# Patient Record
Sex: FEMALE | Race: BLACK OR AFRICAN AMERICAN | Employment: UNEMPLOYED | URBAN - METROPOLITAN AREA
[De-identification: names, ages, dates, MRNs, and addresses within clinical notes are randomized per-mention and may not be internally consistent; named-entity substitution may affect disease eponyms.]

---

## 2018-03-18 ENCOUNTER — HOSPITAL ENCOUNTER (EMERGENCY)
Age: 28
Discharge: HOME | End: 2018-03-18
Payer: COMMERCIAL

## 2018-03-18 VITALS
RESPIRATION RATE: 14 BRPM | SYSTOLIC BLOOD PRESSURE: 133 MMHG | DIASTOLIC BLOOD PRESSURE: 86 MMHG | HEART RATE: 88 BPM | TEMPERATURE: 98.1 F | OXYGEN SATURATION: 99 %

## 2018-03-18 DIAGNOSIS — K04.7 DENTAL ABSCESS: ICD-10-CM

## 2018-03-18 DIAGNOSIS — K08.89 PAIN, DENTAL: Primary | ICD-10-CM

## 2018-03-18 DIAGNOSIS — K02.9 DENTAL DECAY: ICD-10-CM

## 2018-03-18 PROCEDURE — 99282 EMERGENCY DEPT VISIT SF MDM: CPT

## 2018-03-18 RX ORDER — IBUPROFEN 800 MG/1
800 TABLET ORAL EVERY 6 HOURS PRN
Qty: 21 TABLET | Refills: 0 | Status: SHIPPED | OUTPATIENT
Start: 2018-03-18 | End: 2018-07-09 | Stop reason: SDUPTHER

## 2018-03-18 RX ORDER — PENICILLIN V POTASSIUM 500 MG/1
500 TABLET ORAL 4 TIMES DAILY
Qty: 40 TABLET | Refills: 0 | Status: SHIPPED | OUTPATIENT
Start: 2018-03-18 | End: 2018-03-28

## 2018-03-18 ASSESSMENT — PAIN SCALES - GENERAL: PAINLEVEL_OUTOF10: 7

## 2018-03-20 ENCOUNTER — HOSPITAL ENCOUNTER (OUTPATIENT)
Age: 28
Discharge: HOME | End: 2018-03-20
Payer: COMMERCIAL

## 2018-03-20 LAB
ALBUMIN SERPL-MCNC: 4 G/DL (ref 3.5–5.2)
ALP BLD-CCNC: 43 U/L (ref 35–104)
ALT SERPL-CCNC: 27 U/L (ref 0–32)
ANION GAP SERPL CALCULATED.3IONS-SCNC: 13 MMOL/L (ref 7–16)
AST SERPL-CCNC: 21 U/L (ref 0–31)
BASOPHILS ABSOLUTE: 0.04 E9/L (ref 0–0.2)
BASOPHILS RELATIVE PERCENT: 0.6 % (ref 0–2)
BILIRUB SERPL-MCNC: 0.2 MG/DL (ref 0–1.2)
BUN BLDV-MCNC: 17 MG/DL (ref 6–20)
CALCIUM SERPL-MCNC: 9.1 MG/DL (ref 8.6–10.2)
CHLORIDE BLD-SCNC: 102 MMOL/L (ref 98–107)
CO2: 23 MMOL/L (ref 22–29)
CREAT SERPL-MCNC: 0.7 MG/DL (ref 0.5–1)
EOSINOPHILS ABSOLUTE: 0.09 E9/L (ref 0.05–0.5)
EOSINOPHILS RELATIVE PERCENT: 1.4 % (ref 0–6)
GFR AFRICAN AMERICAN: >60
GFR NON-AFRICAN AMERICAN: >60 ML/MIN/1.73
GLUCOSE BLD-MCNC: 104 MG/DL (ref 74–109)
HCT VFR BLD CALC: 40.3 % (ref 34–48)
HEMOGLOBIN: 13 G/DL (ref 11.5–15.5)
IMMATURE GRANULOCYTES #: 0.01 E9/L
IMMATURE GRANULOCYTES %: 0.2 % (ref 0–5)
LYMPHOCYTES ABSOLUTE: 1.58 E9/L (ref 1.5–4)
LYMPHOCYTES RELATIVE PERCENT: 24.2 % (ref 20–42)
MCH RBC QN AUTO: 29 PG (ref 26–35)
MCHC RBC AUTO-ENTMCNC: 32.3 % (ref 32–34.5)
MCV RBC AUTO: 90 FL (ref 80–99.9)
MONOCYTES ABSOLUTE: 0.41 E9/L (ref 0.1–0.95)
MONOCYTES RELATIVE PERCENT: 6.3 % (ref 2–12)
NEUTROPHILS ABSOLUTE: 4.39 E9/L (ref 1.8–7.3)
NEUTROPHILS RELATIVE PERCENT: 67.3 % (ref 43–80)
PDW BLD-RTO: 13.4 FL (ref 11.5–15)
PLATELET # BLD: 282 E9/L (ref 130–450)
PMV BLD AUTO: 10.2 FL (ref 7–12)
POTASSIUM SERPL-SCNC: 4.3 MMOL/L (ref 3.5–5)
PROLACTIN: 16.43 NG/ML
RBC # BLD: 4.48 E12/L (ref 3.5–5.5)
SODIUM BLD-SCNC: 138 MMOL/L (ref 132–146)
T4 TOTAL: 5.1 MCG/DL (ref 4.5–11.7)
TOTAL PROTEIN: 7.1 G/DL (ref 6.4–8.3)
TSH SERPL DL<=0.05 MIU/L-ACNC: 0.74 UIU/ML (ref 0.27–4.2)
WBC # BLD: 6.5 E9/L (ref 4.5–11.5)

## 2018-03-20 PROCEDURE — 85025 COMPLETE CBC W/AUTO DIFF WBC: CPT

## 2018-03-20 PROCEDURE — 84146 ASSAY OF PROLACTIN: CPT

## 2018-03-20 PROCEDURE — 84436 ASSAY OF TOTAL THYROXINE: CPT

## 2018-03-20 PROCEDURE — 36415 COLL VENOUS BLD VENIPUNCTURE: CPT

## 2018-03-20 PROCEDURE — 80053 COMPREHEN METABOLIC PANEL: CPT

## 2018-03-20 PROCEDURE — 84443 ASSAY THYROID STIM HORMONE: CPT

## 2018-06-30 ENCOUNTER — APPOINTMENT (OUTPATIENT)
Dept: GENERAL RADIOLOGY | Age: 28
End: 2018-06-30
Payer: COMMERCIAL

## 2018-06-30 ENCOUNTER — APPOINTMENT (OUTPATIENT)
Dept: CT IMAGING | Age: 28
End: 2018-06-30
Payer: COMMERCIAL

## 2018-06-30 ENCOUNTER — HOSPITAL ENCOUNTER (OUTPATIENT)
Age: 28
Setting detail: OBSERVATION
Discharge: HOME | End: 2018-07-03
Attending: EMERGENCY MEDICINE | Admitting: SURGERY
Payer: COMMERCIAL

## 2018-06-30 ENCOUNTER — HOSPITAL ENCOUNTER (EMERGENCY)
Age: 28
Discharge: LEFT BEFORE DISCHARGE | End: 2018-06-30
Payer: COMMERCIAL

## 2018-06-30 VITALS
SYSTOLIC BLOOD PRESSURE: 127 MMHG | RESPIRATION RATE: 18 BRPM | HEART RATE: 109 BPM | OXYGEN SATURATION: 93 % | DIASTOLIC BLOOD PRESSURE: 100 MMHG

## 2018-06-30 DIAGNOSIS — S16.1XXA STRAIN OF NECK MUSCLE, INITIAL ENCOUNTER: Primary | ICD-10-CM

## 2018-06-30 DIAGNOSIS — T07.XXXA MULTIPLE TRAUMA: Primary | ICD-10-CM
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
## 2018-06-30 PROBLEM — F10.921 ALCOHOL INTOXICATION WITH DELIRIUM (HCC): Status: ACTIVE | Noted: 2018-06-30

## 2018-06-30 PROBLEM — S06.0X1A CONCUSSION WITH LOSS OF CONSCIOUSNESS OF 30 MINUTES OR LESS: Status: ACTIVE | Noted: 2018-06-30

## 2018-06-30 LAB
% INHIBITION AA: 65.1 %
% INHIBITION ADP: 64.4 %
ABO/RH: NORMAL
ACETAMINOPHEN LEVEL: <5 MCG/ML (ref 10–30)
ALBUMIN SERPL-MCNC: 4.2 G/DL (ref 3.5–5.2)
ALP BLD-CCNC: 50 U/L (ref 35–104)
ALT SERPL-CCNC: 28 U/L (ref 0–32)
ANGLE (CLOT STRENGTH): 72 DEGREE (ref 59–74)
ANION GAP SERPL CALCULATED.3IONS-SCNC: 18 MMOL/L (ref 7–16)
ANTIBODY SCREEN: NORMAL
APTT: 29.2 SEC (ref 24.5–35.1)
AST SERPL-CCNC: 27 U/L (ref 0–31)
B.E.: -5.4 MMOL/L (ref -3–3)
BILIRUB SERPL-MCNC: 0.4 MG/DL (ref 0–1.2)
BUN BLDV-MCNC: 18 MG/DL (ref 6–20)
CALCIUM SERPL-MCNC: 9.3 MG/DL (ref 8.6–10.2)
CHLORIDE BLD-SCNC: 102 MMOL/L (ref 98–107)
CO2: 20 MMOL/L (ref 22–29)
COHB: 0.9 % (ref 0–1.5)
CREAT SERPL-MCNC: 0.9 MG/DL (ref 0.5–1)
CRITICAL: ABNORMAL
DATE ANALYZED: ABNORMAL
DATE OF COLLECTION: ABNORMAL
ETHANOL: 204 MG/DL (ref 0–0.08)
G-TEG: 10.2 K D/SC (ref 4.5–11)
GFR AFRICAN AMERICAN: >60
GFR NON-AFRICAN AMERICAN: >60 ML/MIN/1.73
GLUCOSE BLD-MCNC: 100 MG/DL (ref 74–109)
HCG QUALITATIVE: NEGATIVE
HCO3: 18.9 MMOL/L (ref 22–26)
HCT VFR BLD CALC: 39.7 % (ref 34–48)
HEMOGLOBIN: 14.2 G/DL (ref 11.5–15.5)
HHB: 0.3 % (ref 0–5)
INR BLD: 1.1
K (CLOTTING TIME): 1.2 MIN (ref 1–3)
LAB: 9558
LACTIC ACID: 2.2 MMOL/L (ref 0.5–2.2)
Lab: 350
MA (MAX AMPLITUDE): 67.2 MM (ref 50–70)
MA-AA: 26.6 MM
MA-ACTIVATED: 4.8 MM
MA-ADP: 27 MM
MA-TEG BASELINE: 67.2 MM
MCH RBC QN AUTO: 31.9 PG (ref 26–35)
MCHC RBC AUTO-ENTMCNC: 35.8 % (ref 32–34.5)
MCV RBC AUTO: 89.2 FL (ref 80–99.9)
METHB: 0.4 % (ref 0–1.5)
MODE: ABNORMAL
O2 CONTENT: 21.5 ML/DL
O2 SATURATION: 99.7 % (ref 92–98.5)
O2HB: 98.4 % (ref 94–97)
OPERATOR ID: 2860
PATIENT TEMP: 37 C
PCO2: 33.4 MMHG (ref 35–45)
PDW BLD-RTO: 13.7 FL (ref 11.5–15)
PH BLOOD GAS: 7.37 (ref 7.35–7.45)
PLATELET # BLD: 218 E9/L (ref 130–450)
PMV BLD AUTO: 11.2 FL (ref 7–12)
PO2: 457.1 MMHG (ref 60–100)
POTASSIUM SERPL-SCNC: 3.3 MMOL/L (ref 3.5–5)
POTASSIUM SERPL-SCNC: 3.41 MMOL/L (ref 3.3–5.1)
PROTHROMBIN TIME: 12.4 SEC (ref 9.3–12.4)
R (REACTION TIME): 4.5 MIN (ref 5–10)
RBC # BLD: 4.45 E12/L (ref 3.5–5.5)
SALICYLATE, SERUM: <0.3 MG/DL (ref 0–30)
SODIUM BLD-SCNC: 140 MMOL/L (ref 132–146)
SOURCE, BLOOD GAS: ABNORMAL
THB: 14.7 G/DL (ref 11.5–16.5)
TIME ANALYZED: 352
TOTAL PROTEIN: 7.6 G/DL (ref 6.4–8.3)
TRICYCLIC ANTIDEPRESSANTS SCREEN SERUM: NEGATIVE NG/ML
WBC # BLD: 8.5 E9/L (ref 4.5–11.5)

## 2018-06-30 PROCEDURE — 73120 X-RAY EXAM OF HAND: CPT

## 2018-06-30 PROCEDURE — 99222 1ST HOSP IP/OBS MODERATE 55: CPT | Performed by: SURGERY

## 2018-06-30 PROCEDURE — 85347 COAGULATION TIME ACTIVATED: CPT

## 2018-06-30 PROCEDURE — 6360000002 HC RX W HCPCS: Performed by: STUDENT IN AN ORGANIZED HEALTH CARE EDUCATION/TRAINING PROGRAM

## 2018-06-30 PROCEDURE — 71275 CT ANGIOGRAPHY CHEST: CPT

## 2018-06-30 PROCEDURE — 6360000002 HC RX W HCPCS: Performed by: EMERGENCY MEDICINE

## 2018-06-30 PROCEDURE — 6360000004 HC RX CONTRAST MEDICATION: Performed by: RADIOLOGY

## 2018-06-30 PROCEDURE — 80053 COMPREHEN METABOLIC PANEL: CPT

## 2018-06-30 PROCEDURE — 71045 X-RAY EXAM CHEST 1 VIEW: CPT

## 2018-06-30 PROCEDURE — 74177 CT ABD & PELVIS W/CONTRAST: CPT

## 2018-06-30 PROCEDURE — 36415 COLL VENOUS BLD VENIPUNCTURE: CPT

## 2018-06-30 PROCEDURE — 85576 BLOOD PLATELET AGGREGATION: CPT

## 2018-06-30 PROCEDURE — 82805 BLOOD GASES W/O2 SATURATION: CPT

## 2018-06-30 PROCEDURE — 72125 CT NECK SPINE W/O DYE: CPT

## 2018-06-30 PROCEDURE — 86850 RBC ANTIBODY SCREEN: CPT

## 2018-06-30 PROCEDURE — 84703 CHORIONIC GONADOTROPIN ASSAY: CPT

## 2018-06-30 PROCEDURE — G0480 DRUG TEST DEF 1-7 CLASSES: HCPCS

## 2018-06-30 PROCEDURE — G0378 HOSPITAL OBSERVATION PER HR: HCPCS

## 2018-06-30 PROCEDURE — 71260 CT THORAX DX C+: CPT

## 2018-06-30 PROCEDURE — 70450 CT HEAD/BRAIN W/O DYE: CPT

## 2018-06-30 PROCEDURE — 72128 CT CHEST SPINE W/O DYE: CPT

## 2018-06-30 PROCEDURE — 2580000003 HC RX 258: Performed by: RADIOLOGY

## 2018-06-30 PROCEDURE — 85027 COMPLETE CBC AUTOMATED: CPT

## 2018-06-30 PROCEDURE — 85610 PROTHROMBIN TIME: CPT

## 2018-06-30 PROCEDURE — 94150 VITAL CAPACITY TEST: CPT

## 2018-06-30 PROCEDURE — 99285 EMERGENCY DEPT VISIT HI MDM: CPT

## 2018-06-30 PROCEDURE — 70486 CT MAXILLOFACIAL W/O DYE: CPT

## 2018-06-30 PROCEDURE — 99284 EMERGENCY DEPT VISIT MOD MDM: CPT

## 2018-06-30 PROCEDURE — 80307 DRUG TEST PRSMV CHEM ANLYZR: CPT

## 2018-06-30 PROCEDURE — 85730 THROMBOPLASTIN TIME PARTIAL: CPT

## 2018-06-30 PROCEDURE — 85384 FIBRINOGEN ACTIVITY: CPT

## 2018-06-30 PROCEDURE — 6370000000 HC RX 637 (ALT 250 FOR IP): Performed by: STUDENT IN AN ORGANIZED HEALTH CARE EDUCATION/TRAINING PROGRAM

## 2018-06-30 PROCEDURE — 84132 ASSAY OF SERUM POTASSIUM: CPT

## 2018-06-30 PROCEDURE — 96374 THER/PROPH/DIAG INJ IV PUSH: CPT

## 2018-06-30 PROCEDURE — 86900 BLOOD TYPING SEROLOGIC ABO: CPT

## 2018-06-30 PROCEDURE — 36600 WITHDRAWAL OF ARTERIAL BLOOD: CPT

## 2018-06-30 PROCEDURE — 83605 ASSAY OF LACTIC ACID: CPT

## 2018-06-30 PROCEDURE — G0390 TRAUMA RESPONS W/HOSP CRITI: HCPCS

## 2018-06-30 PROCEDURE — 72170 X-RAY EXAM OF PELVIS: CPT

## 2018-06-30 PROCEDURE — 72131 CT LUMBAR SPINE W/O DYE: CPT

## 2018-06-30 PROCEDURE — 2580000003 HC RX 258: Performed by: STUDENT IN AN ORGANIZED HEALTH CARE EDUCATION/TRAINING PROGRAM

## 2018-06-30 PROCEDURE — 86901 BLOOD TYPING SEROLOGIC RH(D): CPT

## 2018-06-30 PROCEDURE — 73610 X-RAY EXAM OF ANKLE: CPT

## 2018-06-30 PROCEDURE — 96375 TX/PRO/DX INJ NEW DRUG ADDON: CPT

## 2018-06-30 RX ORDER — ONDANSETRON 2 MG/ML
4 INJECTION INTRAMUSCULAR; INTRAVENOUS EVERY 6 HOURS PRN
Status: DISCONTINUED | OUTPATIENT
Start: 2018-06-30 | End: 2018-07-03 | Stop reason: HOSPADM

## 2018-06-30 RX ORDER — BUPROPION HYDROCHLORIDE 100 MG/1
100 TABLET, EXTENDED RELEASE ORAL DAILY
COMMUNITY
End: 2020-08-17 | Stop reason: SDUPTHER

## 2018-06-30 RX ORDER — MORPHINE SULFATE 2 MG/ML
2 INJECTION, SOLUTION INTRAMUSCULAR; INTRAVENOUS EVERY 4 HOURS PRN
Status: DISCONTINUED | OUTPATIENT
Start: 2018-06-30 | End: 2018-06-30 | Stop reason: HOSPADM

## 2018-06-30 RX ORDER — SODIUM CHLORIDE 0.9 % (FLUSH) 0.9 %
10 SYRINGE (ML) INJECTION EVERY 12 HOURS SCHEDULED
Status: DISCONTINUED | OUTPATIENT
Start: 2018-06-30 | End: 2018-07-03 | Stop reason: HOSPADM

## 2018-06-30 RX ORDER — ONDANSETRON 2 MG/ML
4 INJECTION INTRAMUSCULAR; INTRAVENOUS EVERY 6 HOURS PRN
Status: DISCONTINUED | OUTPATIENT
Start: 2018-06-30 | End: 2018-06-30 | Stop reason: HOSPADM

## 2018-06-30 RX ORDER — ACETAMINOPHEN 500 MG
1000 TABLET ORAL ONCE
Status: DISCONTINUED | OUTPATIENT
Start: 2018-06-30 | End: 2018-06-30 | Stop reason: HOSPADM

## 2018-06-30 RX ORDER — FENTANYL CITRATE 50 UG/ML
50 INJECTION, SOLUTION INTRAMUSCULAR; INTRAVENOUS ONCE
Status: COMPLETED | OUTPATIENT
Start: 2018-06-30 | End: 2018-06-30

## 2018-06-30 RX ORDER — ZIPRASIDONE MESYLATE 20 MG/ML
INJECTION, POWDER, LYOPHILIZED, FOR SOLUTION INTRAMUSCULAR
Status: DISCONTINUED
Start: 2018-06-30 | End: 2018-06-30 | Stop reason: HOSPADM

## 2018-06-30 RX ORDER — SODIUM CHLORIDE 0.9 % (FLUSH) 0.9 %
10 SYRINGE (ML) INJECTION PRN
Status: DISCONTINUED | OUTPATIENT
Start: 2018-06-30 | End: 2018-07-03 | Stop reason: HOSPADM

## 2018-06-30 RX ORDER — ACETAMINOPHEN 325 MG/1
650 TABLET ORAL EVERY 4 HOURS PRN
Status: DISCONTINUED | OUTPATIENT
Start: 2018-06-30 | End: 2018-07-01

## 2018-06-30 RX ORDER — TETRACAINE HYDROCHLORIDE 5 MG/ML
2 SOLUTION OPHTHALMIC ONCE
Status: DISCONTINUED | OUTPATIENT
Start: 2018-06-30 | End: 2018-06-30 | Stop reason: HOSPADM

## 2018-06-30 RX ORDER — SODIUM CHLORIDE 9 MG/ML
INJECTION, SOLUTION INTRAVENOUS CONTINUOUS
Status: DISCONTINUED | OUTPATIENT
Start: 2018-06-30 | End: 2018-06-30 | Stop reason: HOSPADM

## 2018-06-30 RX ORDER — 0.9 % SODIUM CHLORIDE 0.9 %
1000 INTRAVENOUS SOLUTION INTRAVENOUS ONCE
Status: COMPLETED | OUTPATIENT
Start: 2018-06-30 | End: 2018-06-30

## 2018-06-30 RX ORDER — SODIUM CHLORIDE 0.9 % (FLUSH) 0.9 %
10 SYRINGE (ML) INJECTION ONCE
Status: COMPLETED | OUTPATIENT
Start: 2018-06-30 | End: 2018-06-30

## 2018-06-30 RX ORDER — HYDROCODONE BITARTRATE AND ACETAMINOPHEN 5; 325 MG/1; MG/1
1 TABLET ORAL EVERY 4 HOURS PRN
Status: DISCONTINUED | OUTPATIENT
Start: 2018-06-30 | End: 2018-07-03 | Stop reason: HOSPADM

## 2018-06-30 RX ADMIN — FENTANYL CITRATE 50 MCG: 50 INJECTION, SOLUTION INTRAMUSCULAR; INTRAVENOUS at 08:05

## 2018-06-30 RX ADMIN — HYDROCODONE BITARTRATE AND ACETAMINOPHEN 1 TABLET: 5; 325 TABLET ORAL at 16:39

## 2018-06-30 RX ADMIN — HYDROCODONE BITARTRATE AND ACETAMINOPHEN 1 TABLET: 5; 325 TABLET ORAL at 21:00

## 2018-06-30 RX ADMIN — IOPAMIDOL 60 ML: 755 INJECTION, SOLUTION INTRAVENOUS at 13:44

## 2018-06-30 RX ADMIN — Medication 10 ML: at 21:00

## 2018-06-30 RX ADMIN — Medication 10 ML: at 08:29

## 2018-06-30 RX ADMIN — ONDANSETRON 4 MG: 2 INJECTION INTRAMUSCULAR; INTRAVENOUS at 18:02

## 2018-06-30 RX ADMIN — FENTANYL CITRATE 50 MCG: 50 INJECTION, SOLUTION INTRAMUSCULAR; INTRAVENOUS at 10:30

## 2018-06-30 RX ADMIN — SODIUM CHLORIDE 1000 ML: 9 INJECTION, SOLUTION INTRAVENOUS at 10:30

## 2018-06-30 RX ADMIN — IOPAMIDOL 110 ML: 755 INJECTION, SOLUTION INTRAVENOUS at 08:29

## 2018-06-30 ASSESSMENT — PAIN DESCRIPTION - PAIN TYPE
TYPE: ACUTE PAIN

## 2018-06-30 ASSESSMENT — PAIN SCALES - GENERAL
PAINLEVEL_OUTOF10: 8
PAINLEVEL_OUTOF10: 8
PAINLEVEL_OUTOF10: 0
PAINLEVEL_OUTOF10: 2
PAINLEVEL_OUTOF10: 6
PAINLEVEL_OUTOF10: 8
PAINLEVEL_OUTOF10: 7
PAINLEVEL_OUTOF10: 1
PAINLEVEL_OUTOF10: 8

## 2018-06-30 ASSESSMENT — PAIN DESCRIPTION - LOCATION
LOCATION: NECK
LOCATION: NECK
LOCATION: FACE;GENERALIZED
LOCATION: NECK

## 2018-06-30 ASSESSMENT — PAIN DESCRIPTION - FREQUENCY: FREQUENCY: CONTINUOUS

## 2018-06-30 ASSESSMENT — PAIN DESCRIPTION - DESCRIPTORS
DESCRIPTORS: ACHING;CONSTANT;DISCOMFORT
DESCRIPTORS: DISCOMFORT
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
## 2018-06-30 NOTE — PROGRESS NOTES
ABG drawn x 1 from Left Brachial. Patient had NormalAllen's Test.  Patient was on 15 liters/min via non-rebreather face mask  at time of puncture. Pressure held for 5. No bleeding or bruising noted at puncture site.   Patient tolerated procedure well      Performed by oCni Griffith

## 2018-07-01 ENCOUNTER — APPOINTMENT (OUTPATIENT)
Dept: GENERAL RADIOLOGY | Age: 28
End: 2018-07-01
Payer: COMMERCIAL

## 2018-07-01 ENCOUNTER — APPOINTMENT (OUTPATIENT)
Dept: MRI IMAGING | Age: 28
End: 2018-07-01
Payer: COMMERCIAL

## 2018-07-01 PROBLEM — S14.109A CONTUSION OF CERVICAL CORD (HCC): Status: ACTIVE | Noted: 2018-07-01

## 2018-07-01 LAB
ALBUMIN SERPL-MCNC: 3.7 G/DL (ref 3.5–5.2)
ALP BLD-CCNC: 49 U/L (ref 35–104)
ALT SERPL-CCNC: 28 U/L (ref 0–32)
ANION GAP SERPL CALCULATED.3IONS-SCNC: 14 MMOL/L (ref 7–16)
AST SERPL-CCNC: 32 U/L (ref 0–31)
BASOPHILS ABSOLUTE: 0.03 E9/L (ref 0–0.2)
BASOPHILS RELATIVE PERCENT: 0.4 % (ref 0–2)
BILIRUB SERPL-MCNC: 0.8 MG/DL (ref 0–1.2)
BUN BLDV-MCNC: 16 MG/DL (ref 6–20)
CALCIUM SERPL-MCNC: 8.5 MG/DL (ref 8.6–10.2)
CHLORIDE BLD-SCNC: 104 MMOL/L (ref 98–107)
CO2: 21 MMOL/L (ref 22–29)
CREAT SERPL-MCNC: 0.7 MG/DL (ref 0.5–1)
EOSINOPHILS ABSOLUTE: 0.06 E9/L (ref 0.05–0.5)
EOSINOPHILS RELATIVE PERCENT: 0.8 % (ref 0–6)
GFR AFRICAN AMERICAN: >60
GFR NON-AFRICAN AMERICAN: >60 ML/MIN/1.73
GLUCOSE BLD-MCNC: 89 MG/DL (ref 74–109)
HCT VFR BLD CALC: 38.9 % (ref 34–48)
HEMOGLOBIN: 13 G/DL (ref 11.5–15.5)
IMMATURE GRANULOCYTES #: 0.03 E9/L
IMMATURE GRANULOCYTES %: 0.4 % (ref 0–5)
LYMPHOCYTES ABSOLUTE: 1.94 E9/L (ref 1.5–4)
LYMPHOCYTES RELATIVE PERCENT: 26.7 % (ref 20–42)
MCH RBC QN AUTO: 30.3 PG (ref 26–35)
MCHC RBC AUTO-ENTMCNC: 33.4 % (ref 32–34.5)
MCV RBC AUTO: 90.7 FL (ref 80–99.9)
MONOCYTES ABSOLUTE: 0.61 E9/L (ref 0.1–0.95)
MONOCYTES RELATIVE PERCENT: 8.4 % (ref 2–12)
NEUTROPHILS ABSOLUTE: 4.6 E9/L (ref 1.8–7.3)
NEUTROPHILS RELATIVE PERCENT: 63.3 % (ref 43–80)
PDW BLD-RTO: 14 FL (ref 11.5–15)
PLATELET # BLD: 186 E9/L (ref 130–450)
PMV BLD AUTO: 11.1 FL (ref 7–12)
POTASSIUM REFLEX MAGNESIUM: 3.7 MMOL/L (ref 3.5–5)
RBC # BLD: 4.29 E12/L (ref 3.5–5.5)
SODIUM BLD-SCNC: 139 MMOL/L (ref 132–146)
TOTAL PROTEIN: 6.7 G/DL (ref 6.4–8.3)
WBC # BLD: 7.3 E9/L (ref 4.5–11.5)

## 2018-07-01 PROCEDURE — 99232 SBSQ HOSP IP/OBS MODERATE 35: CPT | Performed by: SURGERY

## 2018-07-01 PROCEDURE — 36415 COLL VENOUS BLD VENIPUNCTURE: CPT

## 2018-07-01 PROCEDURE — 73590 X-RAY EXAM OF LOWER LEG: CPT

## 2018-07-01 PROCEDURE — 6370000000 HC RX 637 (ALT 250 FOR IP): Performed by: STUDENT IN AN ORGANIZED HEALTH CARE EDUCATION/TRAINING PROGRAM

## 2018-07-01 PROCEDURE — 73610 X-RAY EXAM OF ANKLE: CPT

## 2018-07-01 PROCEDURE — 2580000003 HC RX 258: Performed by: STUDENT IN AN ORGANIZED HEALTH CARE EDUCATION/TRAINING PROGRAM

## 2018-07-01 PROCEDURE — 73552 X-RAY EXAM OF FEMUR 2/>: CPT

## 2018-07-01 PROCEDURE — 80053 COMPREHEN METABOLIC PANEL: CPT

## 2018-07-01 PROCEDURE — G0378 HOSPITAL OBSERVATION PER HR: HCPCS

## 2018-07-01 PROCEDURE — 85025 COMPLETE CBC W/AUTO DIFF WBC: CPT

## 2018-07-01 PROCEDURE — 72146 MRI CHEST SPINE W/O DYE: CPT

## 2018-07-01 PROCEDURE — 72141 MRI NECK SPINE W/O DYE: CPT

## 2018-07-01 RX ORDER — NICOTINE 21 MG/24HR
1 PATCH, TRANSDERMAL 24 HOURS TRANSDERMAL DAILY
Status: DISCONTINUED | OUTPATIENT
Start: 2018-07-01 | End: 2018-07-03 | Stop reason: HOSPADM

## 2018-07-01 RX ORDER — ACETAMINOPHEN 325 MG/1
650 TABLET ORAL EVERY 4 HOURS
Status: DISCONTINUED | OUTPATIENT
Start: 2018-07-01 | End: 2018-07-03 | Stop reason: HOSPADM

## 2018-07-01 RX ORDER — IBUPROFEN 800 MG/1
800 TABLET ORAL
Status: DISCONTINUED | OUTPATIENT
Start: 2018-07-01 | End: 2018-07-03 | Stop reason: HOSPADM

## 2018-07-01 RX ORDER — DOCUSATE SODIUM 100 MG/1
100 CAPSULE, LIQUID FILLED ORAL DAILY
Status: DISCONTINUED | OUTPATIENT
Start: 2018-07-01 | End: 2018-07-03 | Stop reason: HOSPADM

## 2018-07-01 RX ADMIN — HYDROCODONE BITARTRATE AND ACETAMINOPHEN 1 TABLET: 5; 325 TABLET ORAL at 20:39

## 2018-07-01 RX ADMIN — Medication 10 ML: at 08:22

## 2018-07-01 RX ADMIN — IBUPROFEN 800 MG: 800 TABLET ORAL at 20:40

## 2018-07-01 RX ADMIN — ACETAMINOPHEN 650 MG: 325 TABLET, FILM COATED ORAL at 08:22

## 2018-07-01 RX ADMIN — Medication 10 ML: at 20:40

## 2018-07-01 RX ADMIN — DOCUSATE SODIUM 100 MG: 100 CAPSULE, LIQUID FILLED ORAL at 11:57

## 2018-07-01 RX ADMIN — HYDROCODONE BITARTRATE AND ACETAMINOPHEN 1 TABLET: 5; 325 TABLET ORAL at 01:03

## 2018-07-01 RX ADMIN — HYDROCODONE BITARTRATE AND ACETAMINOPHEN 1 TABLET: 5; 325 TABLET ORAL at 11:57

## 2018-07-01 RX ADMIN — HYDROCODONE BITARTRATE AND ACETAMINOPHEN 1 TABLET: 5; 325 TABLET ORAL at 06:10

## 2018-07-01 RX ADMIN — ACETAMINOPHEN 650 MG: 325 TABLET ORAL at 20:39

## 2018-07-01 RX ADMIN — HYDROCODONE BITARTRATE AND ACETAMINOPHEN 1 TABLET: 5; 325 TABLET ORAL at 16:43

## 2018-07-01 ASSESSMENT — PAIN DESCRIPTION - PAIN TYPE
TYPE: ACUTE PAIN

## 2018-07-01 ASSESSMENT — PAIN DESCRIPTION - ONSET
ONSET: ON-GOING

## 2018-07-01 ASSESSMENT — PAIN SCALES - GENERAL
PAINLEVEL_OUTOF10: 10
PAINLEVEL_OUTOF10: 0
PAINLEVEL_OUTOF10: 8
PAINLEVEL_OUTOF10: 0
PAINLEVEL_OUTOF10: 0
PAINLEVEL_OUTOF10: 8
PAINLEVEL_OUTOF10: 0
PAINLEVEL_OUTOF10: 8
PAINLEVEL_OUTOF10: 10
PAINLEVEL_OUTOF10: 10

## 2018-07-01 ASSESSMENT — PAIN DESCRIPTION - DESCRIPTORS
DESCRIPTORS: ACHING;CONSTANT;DISCOMFORT
DESCRIPTORS: ACHING;CONSTANT;DISCOMFORT
DESCRIPTORS: DISCOMFORT;ACHING
DESCRIPTORS: ACHING;CONSTANT;DISCOMFORT
DESCRIPTORS: ACHING;SORE
DESCRIPTORS: ACHING;DISCOMFORT;CONSTANT

## 2018-07-01 ASSESSMENT — PAIN DESCRIPTION - LOCATION
LOCATION: GENERALIZED
LOCATION: NECK
LOCATION: GENERALIZED
LOCATION: NECK
LOCATION: GENERALIZED;BACK
LOCATION: NECK

## 2018-07-01 ASSESSMENT — PAIN DESCRIPTION - FREQUENCY
FREQUENCY: CONTINUOUS
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
## 2018-07-01 NOTE — PLAN OF CARE
Problem: Pain:  Goal: Control of acute pain  Control of acute pain   Outcome: Met This Shift      Problem: Risk for Impaired Skin Integrity  Goal: Tissue integrity - skin and mucous membranes  Structural intactness and normal physiological function of skin and  mucous membranes.    Outcome: Met This Shift

## 2018-07-01 NOTE — PROGRESS NOTES
Neuro consult sent to Dr. Jennifer Shields, by perfect, awaiting reply.   Kevin Norman, RN  7/1/2018  7:45 PM

## 2018-07-02 LAB
ALBUMIN SERPL-MCNC: 3.5 G/DL (ref 3.5–5.2)
ALP BLD-CCNC: 42 U/L (ref 35–104)
ALT SERPL-CCNC: 27 U/L (ref 0–32)
ANION GAP SERPL CALCULATED.3IONS-SCNC: 11 MMOL/L (ref 7–16)
AST SERPL-CCNC: 24 U/L (ref 0–31)
BASOPHILS ABSOLUTE: 0.04 E9/L (ref 0–0.2)
BASOPHILS RELATIVE PERCENT: 0.7 % (ref 0–2)
BILIRUB SERPL-MCNC: 0.4 MG/DL (ref 0–1.2)
BUN BLDV-MCNC: 18 MG/DL (ref 6–20)
CALCIUM SERPL-MCNC: 8.5 MG/DL (ref 8.6–10.2)
CHLORIDE BLD-SCNC: 100 MMOL/L (ref 98–107)
CO2: 24 MMOL/L (ref 22–29)
CREAT SERPL-MCNC: 0.7 MG/DL (ref 0.5–1)
EOSINOPHILS ABSOLUTE: 0.11 E9/L (ref 0.05–0.5)
EOSINOPHILS RELATIVE PERCENT: 2 % (ref 0–6)
GFR AFRICAN AMERICAN: >60
GFR NON-AFRICAN AMERICAN: >60 ML/MIN/1.73
GLUCOSE BLD-MCNC: 108 MG/DL (ref 74–109)
HCT VFR BLD CALC: 37.8 % (ref 34–48)
HEMOGLOBIN: 12.9 G/DL (ref 11.5–15.5)
IMMATURE GRANULOCYTES #: 0.02 E9/L
IMMATURE GRANULOCYTES %: 0.4 % (ref 0–5)
LYMPHOCYTES ABSOLUTE: 1.73 E9/L (ref 1.5–4)
LYMPHOCYTES RELATIVE PERCENT: 30.7 % (ref 20–42)
MAGNESIUM: 2.1 MG/DL (ref 1.6–2.6)
MCH RBC QN AUTO: 30.9 PG (ref 26–35)
MCHC RBC AUTO-ENTMCNC: 34.1 % (ref 32–34.5)
MCV RBC AUTO: 90.6 FL (ref 80–99.9)
MONOCYTES ABSOLUTE: 0.35 E9/L (ref 0.1–0.95)
MONOCYTES RELATIVE PERCENT: 6.2 % (ref 2–12)
NEUTROPHILS ABSOLUTE: 3.39 E9/L (ref 1.8–7.3)
NEUTROPHILS RELATIVE PERCENT: 60 % (ref 43–80)
PDW BLD-RTO: 13.8 FL (ref 11.5–15)
PLATELET # BLD: 202 E9/L (ref 130–450)
PMV BLD AUTO: 11.1 FL (ref 7–12)
POTASSIUM REFLEX MAGNESIUM: 3.4 MMOL/L (ref 3.5–5)
RBC # BLD: 4.17 E12/L (ref 3.5–5.5)
SODIUM BLD-SCNC: 135 MMOL/L (ref 132–146)
TOTAL PROTEIN: 6.8 G/DL (ref 6.4–8.3)
WBC # BLD: 5.6 E9/L (ref 4.5–11.5)

## 2018-07-02 PROCEDURE — 6370000000 HC RX 637 (ALT 250 FOR IP): Performed by: STUDENT IN AN ORGANIZED HEALTH CARE EDUCATION/TRAINING PROGRAM

## 2018-07-02 PROCEDURE — 99232 SBSQ HOSP IP/OBS MODERATE 35: CPT | Performed by: SURGERY

## 2018-07-02 PROCEDURE — APPSS45 APP SPLIT SHARED TIME 31-45 MINUTES: Performed by: NURSE PRACTITIONER

## 2018-07-02 PROCEDURE — 36415 COLL VENOUS BLD VENIPUNCTURE: CPT

## 2018-07-02 PROCEDURE — 85025 COMPLETE CBC W/AUTO DIFF WBC: CPT

## 2018-07-02 PROCEDURE — 80053 COMPREHEN METABOLIC PANEL: CPT

## 2018-07-02 PROCEDURE — G8979 MOBILITY GOAL STATUS: HCPCS

## 2018-07-02 PROCEDURE — 83735 ASSAY OF MAGNESIUM: CPT

## 2018-07-02 PROCEDURE — 6370000000 HC RX 637 (ALT 250 FOR IP): Performed by: NURSE PRACTITIONER

## 2018-07-02 PROCEDURE — G8987 SELF CARE CURRENT STATUS: HCPCS

## 2018-07-02 PROCEDURE — 97165 OT EVAL LOW COMPLEX 30 MIN: CPT

## 2018-07-02 PROCEDURE — G8988 SELF CARE GOAL STATUS: HCPCS

## 2018-07-02 PROCEDURE — 6360000002 HC RX W HCPCS: Performed by: STUDENT IN AN ORGANIZED HEALTH CARE EDUCATION/TRAINING PROGRAM

## 2018-07-02 PROCEDURE — G8978 MOBILITY CURRENT STATUS: HCPCS

## 2018-07-02 PROCEDURE — 2580000003 HC RX 258: Performed by: STUDENT IN AN ORGANIZED HEALTH CARE EDUCATION/TRAINING PROGRAM

## 2018-07-02 PROCEDURE — 97162 PT EVAL MOD COMPLEX 30 MIN: CPT

## 2018-07-02 PROCEDURE — G0378 HOSPITAL OBSERVATION PER HR: HCPCS

## 2018-07-02 PROCEDURE — 96376 TX/PRO/DX INJ SAME DRUG ADON: CPT

## 2018-07-02 PROCEDURE — 99219 PR INITIAL OBSERVATION CARE/DAY 50 MINUTES: CPT | Performed by: NEUROLOGICAL SURGERY

## 2018-07-02 RX ORDER — POTASSIUM CHLORIDE 20 MEQ/1
40 TABLET, EXTENDED RELEASE ORAL ONCE
Status: COMPLETED | OUTPATIENT
Start: 2018-07-02 | End: 2018-07-02

## 2018-07-02 RX ORDER — METHOCARBAMOL 500 MG/1
500 TABLET, FILM COATED ORAL 4 TIMES DAILY
Status: DISCONTINUED | OUTPATIENT
Start: 2018-07-02 | End: 2018-07-03 | Stop reason: HOSPADM

## 2018-07-02 RX ADMIN — HYDROCODONE BITARTRATE AND ACETAMINOPHEN 1 TABLET: 5; 325 TABLET ORAL at 13:16

## 2018-07-02 RX ADMIN — METHOCARBAMOL 500 MG: 500 TABLET ORAL at 20:48

## 2018-07-02 RX ADMIN — ACETAMINOPHEN 650 MG: 325 TABLET ORAL at 12:04

## 2018-07-02 RX ADMIN — IBUPROFEN 800 MG: 800 TABLET ORAL at 12:04

## 2018-07-02 RX ADMIN — ACETAMINOPHEN 650 MG: 325 TABLET ORAL at 20:51

## 2018-07-02 RX ADMIN — IBUPROFEN 800 MG: 800 TABLET ORAL at 16:01

## 2018-07-02 RX ADMIN — METHOCARBAMOL 500 MG: 500 TABLET ORAL at 16:01

## 2018-07-02 RX ADMIN — HYDROCODONE BITARTRATE AND ACETAMINOPHEN 1 TABLET: 5; 325 TABLET ORAL at 23:11

## 2018-07-02 RX ADMIN — ONDANSETRON 4 MG: 2 INJECTION INTRAMUSCULAR; INTRAVENOUS at 08:21

## 2018-07-02 RX ADMIN — HYDROCODONE BITARTRATE AND ACETAMINOPHEN 1 TABLET: 5; 325 TABLET ORAL at 08:20

## 2018-07-02 RX ADMIN — HYDROCODONE BITARTRATE AND ACETAMINOPHEN 1 TABLET: 5; 325 TABLET ORAL at 19:09

## 2018-07-02 RX ADMIN — HYDROCODONE BITARTRATE AND ACETAMINOPHEN 1 TABLET: 5; 325 TABLET ORAL at 02:01

## 2018-07-02 RX ADMIN — DOCUSATE SODIUM 100 MG: 100 CAPSULE, LIQUID FILLED ORAL at 08:20

## 2018-07-02 RX ADMIN — Medication 10 ML: at 20:47

## 2018-07-02 RX ADMIN — POTASSIUM CHLORIDE 40 MEQ: 20 TABLET, EXTENDED RELEASE ORAL at 16:02

## 2018-07-02 RX ADMIN — IBUPROFEN 800 MG: 800 TABLET ORAL at 08:20

## 2018-07-02 RX ADMIN — Medication 10 ML: at 08:21

## 2018-07-02 ASSESSMENT — PAIN DESCRIPTION - DESCRIPTORS
DESCRIPTORS: ACHING;CONSTANT;DISCOMFORT
DESCRIPTORS: ACHING
DESCRIPTORS: ACHING;CONSTANT;DISCOMFORT

## 2018-07-02 ASSESSMENT — PAIN SCALES - GENERAL
PAINLEVEL_OUTOF10: 8
PAINLEVEL_OUTOF10: 8
PAINLEVEL_OUTOF10: 0
PAINLEVEL_OUTOF10: 8
PAINLEVEL_OUTOF10: 8
PAINLEVEL_OUTOF10: 6
PAINLEVEL_OUTOF10: 8
PAINLEVEL_OUTOF10: 10
PAINLEVEL_OUTOF10: 10
PAINLEVEL_OUTOF10: 0
PAINLEVEL_OUTOF10: 0

## 2018-07-02 ASSESSMENT — PAIN DESCRIPTION - PAIN TYPE
TYPE: ACUTE PAIN
TYPE: ACUTE PAIN

## 2018-07-02 ASSESSMENT — PAIN DESCRIPTION - FREQUENCY
FREQUENCY: CONTINUOUS
FREQUENCY: CONTINUOUS

## 2018-07-02 ASSESSMENT — ENCOUNTER SYMPTOMS
GASTROINTESTINAL NEGATIVE: 1
RESPIRATORY NEGATIVE: 1
EYES NEGATIVE: 1

## 2018-07-02 ASSESSMENT — PAIN DESCRIPTION - ONSET
ONSET: ON-GOING
ONSET: ON-GOING

## 2018-07-02 ASSESSMENT — PAIN DESCRIPTION - LOCATION
LOCATION: GENERALIZED
LOCATION: NECK
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
## 2018-07-02 NOTE — PROGRESS NOTES
Cervical imaging negative for acute fx or ligamentous injury. Cervical collar removed. Palpation with tenderness paraspinal area. Limited ROM d/t stiffness from collar. Robaxin ordered. Cervical collar to remain off. Soft collar PRN.

## 2018-07-02 NOTE — PROGRESS NOTES
status. Respirations easy non labored. · GI: No acute issues. Monitor bowel function. · Renal: Hypokalemia, supplement. Monitor UOP and electrolytes. · ID: No acute issues. Afebrile. · Endocrine:  No acute issues. · MSK: Tenderness of C spine. Robaxin. PT/OT  · Heme: No acute issues. · Optho: Blurred vision of left eye. Opthalmology following. Bowel regime: Colace. PRN MOM. Pain control/Sedation: Robaxin. Norco.  DVT prophylaxis: SCDs. Lovenox. GI: Diet. Zofran. Mouth/Eye care:. Per pt as needed. Code status: Full    Patient/Family update: Pt questions answered at bedside.     Disposition: . Pck 125, APRN-CNP  7/2/2018  10:30 AM
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
## 2018-07-02 NOTE — PROGRESS NOTES
PCP Dr Citlalli Blanco notified of admission    Electronically signed by AKHIL Sanchez CNP on 7/2/2018 at 9:51 AM

## 2018-07-03 VITALS
DIASTOLIC BLOOD PRESSURE: 64 MMHG | HEART RATE: 58 BPM | WEIGHT: 140 LBS | RESPIRATION RATE: 16 BRPM | SYSTOLIC BLOOD PRESSURE: 104 MMHG | HEIGHT: 64 IN | OXYGEN SATURATION: 95 % | BODY MASS INDEX: 23.9 KG/M2 | TEMPERATURE: 97.2 F

## 2018-07-03 LAB
ANION GAP SERPL CALCULATED.3IONS-SCNC: 13 MMOL/L (ref 7–16)
BUN BLDV-MCNC: 19 MG/DL (ref 6–20)
CALCIUM SERPL-MCNC: 8.9 MG/DL (ref 8.6–10.2)
CHLORIDE BLD-SCNC: 100 MMOL/L (ref 98–107)
CO2: 23 MMOL/L (ref 22–29)
CREAT SERPL-MCNC: 0.7 MG/DL (ref 0.5–1)
GFR AFRICAN AMERICAN: >60
GFR NON-AFRICAN AMERICAN: >60 ML/MIN/1.73
GLUCOSE BLD-MCNC: 88 MG/DL (ref 74–109)
POTASSIUM SERPL-SCNC: 4 MMOL/L (ref 3.5–5)
SODIUM BLD-SCNC: 136 MMOL/L (ref 132–146)

## 2018-07-03 PROCEDURE — APPSS30 APP SPLIT SHARED TIME 16-30 MINUTES: Performed by: NURSE PRACTITIONER

## 2018-07-03 PROCEDURE — 99238 HOSP IP/OBS DSCHRG MGMT 30/<: CPT | Performed by: SURGERY

## 2018-07-03 PROCEDURE — 2580000003 HC RX 258: Performed by: STUDENT IN AN ORGANIZED HEALTH CARE EDUCATION/TRAINING PROGRAM

## 2018-07-03 PROCEDURE — G0378 HOSPITAL OBSERVATION PER HR: HCPCS

## 2018-07-03 PROCEDURE — 80048 BASIC METABOLIC PNL TOTAL CA: CPT

## 2018-07-03 PROCEDURE — 6370000000 HC RX 637 (ALT 250 FOR IP): Performed by: STUDENT IN AN ORGANIZED HEALTH CARE EDUCATION/TRAINING PROGRAM

## 2018-07-03 PROCEDURE — 6370000000 HC RX 637 (ALT 250 FOR IP): Performed by: NURSE PRACTITIONER

## 2018-07-03 PROCEDURE — 99225 PR SBSQ OBSERVATION CARE/DAY 25 MINUTES: CPT | Performed by: NEUROLOGICAL SURGERY

## 2018-07-03 PROCEDURE — 36415 COLL VENOUS BLD VENIPUNCTURE: CPT

## 2018-07-03 RX ORDER — HYDROCODONE BITARTRATE AND ACETAMINOPHEN 5; 325 MG/1; MG/1
1 TABLET ORAL EVERY 6 HOURS PRN
Qty: 21 TABLET | Refills: 0 | Status: SHIPPED | OUTPATIENT
Start: 2018-07-03 | End: 2018-07-06

## 2018-07-03 RX ORDER — IBUPROFEN 800 MG/1
800 TABLET ORAL
Qty: 120 TABLET | Refills: 3 | Status: SHIPPED | OUTPATIENT
Start: 2018-07-03 | End: 2018-10-09

## 2018-07-03 RX ORDER — METHOCARBAMOL 500 MG/1
500 TABLET, FILM COATED ORAL 4 TIMES DAILY
Qty: 40 TABLET | Refills: 0 | Status: SHIPPED | OUTPATIENT
Start: 2018-07-03 | End: 2018-07-13

## 2018-07-03 RX ADMIN — METHOCARBAMOL 500 MG: 500 TABLET ORAL at 09:48

## 2018-07-03 RX ADMIN — METHOCARBAMOL 500 MG: 500 TABLET ORAL at 13:16

## 2018-07-03 RX ADMIN — IBUPROFEN 800 MG: 800 TABLET ORAL at 13:16

## 2018-07-03 RX ADMIN — HYDROCODONE BITARTRATE AND ACETAMINOPHEN 1 TABLET: 5; 325 TABLET ORAL at 14:18

## 2018-07-03 RX ADMIN — HYDROCODONE BITARTRATE AND ACETAMINOPHEN 1 TABLET: 5; 325 TABLET ORAL at 10:15

## 2018-07-03 RX ADMIN — HYDROCODONE BITARTRATE AND ACETAMINOPHEN 1 TABLET: 5; 325 TABLET ORAL at 06:02

## 2018-07-03 RX ADMIN — MAGNESIUM HYDROXIDE 30 ML: 400 SUSPENSION ORAL at 09:56

## 2018-07-03 RX ADMIN — Medication 10 ML: at 09:50

## 2018-07-03 RX ADMIN — DOCUSATE SODIUM 100 MG: 100 CAPSULE, LIQUID FILLED ORAL at 09:49

## 2018-07-03 RX ADMIN — IBUPROFEN 800 MG: 800 TABLET ORAL at 09:48

## 2018-07-03 ASSESSMENT — PAIN DESCRIPTION - PAIN TYPE
TYPE: ACUTE PAIN
TYPE: ACUTE PAIN

## 2018-07-03 ASSESSMENT — PAIN SCALES - GENERAL
PAINLEVEL_OUTOF10: 8
PAINLEVEL_OUTOF10: 2
PAINLEVEL_OUTOF10: 4
PAINLEVEL_OUTOF10: 9
PAINLEVEL_OUTOF10: 8
PAINLEVEL_OUTOF10: 8
PAINLEVEL_OUTOF10: 10

## 2018-07-03 ASSESSMENT — PAIN DESCRIPTION - LOCATION
LOCATION: NECK
LOCATION: NECK

## 2018-07-03 ASSESSMENT — PAIN DESCRIPTION - DESCRIPTORS: DESCRIPTORS: ACHING;DISCOMFORT;SORE

## 2018-07-03 ASSESSMENT — PAIN DESCRIPTION - FREQUENCY: FREQUENCY: INTERMITTENT

## 2018-07-03 ASSESSMENT — PAIN DESCRIPTION - ONSET: ONSET: ON-GOING

## 2018-07-03 NOTE — CARE COORDINATION
CATHLEEN Discharge Planning:    SW met with patient. Patient stated that she will need a ww. Patient is agreeable to [address]. Referral made. Patient will discharge home today with her mom.  Daisy Godoy
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
## 2018-07-03 NOTE — CONSULTS
normal        Assessment:         Plan:  I explained to patient what symptoms should be rechecked. Please notify me immediately if patient develops any change in vision. Please have patient follow up with me or His/Hers ophthalmologist after discharge. My office # is [phone].   Thank you Nusrat Tillman
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
## 2018-07-09 ENCOUNTER — TELEPHONE (OUTPATIENT)
Dept: SURGERY | Age: 28
End: 2018-07-09

## 2018-07-09 ENCOUNTER — HOSPITAL ENCOUNTER (EMERGENCY)
Age: 28
Discharge: HOME | End: 2018-07-09
Attending: EMERGENCY MEDICINE
Payer: COMMERCIAL

## 2018-07-09 VITALS
BODY MASS INDEX: 34.15 KG/M2 | SYSTOLIC BLOOD PRESSURE: 123 MMHG | OXYGEN SATURATION: 100 % | DIASTOLIC BLOOD PRESSURE: 68 MMHG | WEIGHT: 200 LBS | RESPIRATION RATE: 14 BRPM | HEIGHT: 64 IN | TEMPERATURE: 98.1 F | HEART RATE: 82 BPM

## 2018-07-09 DIAGNOSIS — K92.2 GASTROINTESTINAL HEMORRHAGE, UNSPECIFIED GASTROINTESTINAL HEMORRHAGE TYPE: Primary | ICD-10-CM

## 2018-07-09 LAB
ABO/RH: NORMAL
ANION GAP SERPL CALCULATED.3IONS-SCNC: 11 MMOL/L (ref 7–16)
ANTIBODY SCREEN: NORMAL
BASOPHILS ABSOLUTE: 0.05 E9/L (ref 0–0.2)
BASOPHILS RELATIVE PERCENT: 0.8 % (ref 0–2)
BUN BLDV-MCNC: 15 MG/DL (ref 6–20)
CALCIUM SERPL-MCNC: 9.4 MG/DL (ref 8.6–10.2)
CHLORIDE BLD-SCNC: 102 MMOL/L (ref 98–107)
CO2: 25 MMOL/L (ref 22–29)
CREAT SERPL-MCNC: 0.6 MG/DL (ref 0.5–1)
EOSINOPHILS ABSOLUTE: 0.14 E9/L (ref 0.05–0.5)
EOSINOPHILS RELATIVE PERCENT: 2.3 % (ref 0–6)
GFR AFRICAN AMERICAN: >60
GFR NON-AFRICAN AMERICAN: >60 ML/MIN/1.73
GLUCOSE BLD-MCNC: 92 MG/DL (ref 74–109)
HCT VFR BLD CALC: 39.7 % (ref 34–48)
HEMOGLOBIN: 13.4 G/DL (ref 11.5–15.5)
IMMATURE GRANULOCYTES #: 0.03 E9/L
IMMATURE GRANULOCYTES %: 0.5 % (ref 0–5)
LACTIC ACID: 1.2 MMOL/L (ref 0.5–2.2)
LYMPHOCYTES ABSOLUTE: 2.02 E9/L (ref 1.5–4)
LYMPHOCYTES RELATIVE PERCENT: 32.5 % (ref 20–42)
MCH RBC QN AUTO: 30.9 PG (ref 26–35)
MCHC RBC AUTO-ENTMCNC: 33.8 % (ref 32–34.5)
MCV RBC AUTO: 91.7 FL (ref 80–99.9)
MONOCYTES ABSOLUTE: 0.47 E9/L (ref 0.1–0.95)
MONOCYTES RELATIVE PERCENT: 7.6 % (ref 2–12)
NEUTROPHILS ABSOLUTE: 3.51 E9/L (ref 1.8–7.3)
NEUTROPHILS RELATIVE PERCENT: 56.3 % (ref 43–80)
PDW BLD-RTO: 13.4 FL (ref 11.5–15)
PLATELET # BLD: 291 E9/L (ref 130–450)
PMV BLD AUTO: 10.5 FL (ref 7–12)
POTASSIUM SERPL-SCNC: 5.4 MMOL/L (ref 3.5–5)
RBC # BLD: 4.33 E12/L (ref 3.5–5.5)
SODIUM BLD-SCNC: 138 MMOL/L (ref 132–146)
WBC # BLD: 6.2 E9/L (ref 4.5–11.5)

## 2018-07-09 PROCEDURE — 2580000003 HC RX 258: Performed by: EMERGENCY MEDICINE

## 2018-07-09 PROCEDURE — 96374 THER/PROPH/DIAG INJ IV PUSH: CPT

## 2018-07-09 PROCEDURE — 86901 BLOOD TYPING SEROLOGIC RH(D): CPT

## 2018-07-09 PROCEDURE — 36415 COLL VENOUS BLD VENIPUNCTURE: CPT

## 2018-07-09 PROCEDURE — 83605 ASSAY OF LACTIC ACID: CPT

## 2018-07-09 PROCEDURE — 86850 RBC ANTIBODY SCREEN: CPT

## 2018-07-09 PROCEDURE — 86900 BLOOD TYPING SEROLOGIC ABO: CPT

## 2018-07-09 PROCEDURE — 99283 EMERGENCY DEPT VISIT LOW MDM: CPT

## 2018-07-09 PROCEDURE — C9113 INJ PANTOPRAZOLE SODIUM, VIA: HCPCS | Performed by: EMERGENCY MEDICINE

## 2018-07-09 PROCEDURE — 85025 COMPLETE CBC W/AUTO DIFF WBC: CPT

## 2018-07-09 PROCEDURE — 6360000002 HC RX W HCPCS: Performed by: EMERGENCY MEDICINE

## 2018-07-09 PROCEDURE — 80048 BASIC METABOLIC PNL TOTAL CA: CPT

## 2018-07-09 RX ORDER — OMEPRAZOLE 20 MG/1
20 CAPSULE, DELAYED RELEASE ORAL EVERY 12 HOURS
Qty: 28 CAPSULE | Refills: 0 | Status: SHIPPED | OUTPATIENT
Start: 2018-07-09 | End: 2020-06-09 | Stop reason: SDUPTHER

## 2018-07-09 RX ORDER — SODIUM CHLORIDE 0.9 % (FLUSH) 0.9 %
SYRINGE (ML) INJECTION
Status: DISCONTINUED
Start: 2018-07-09 | End: 2018-07-09 | Stop reason: HOSPADM

## 2018-07-09 RX ORDER — PANTOPRAZOLE SODIUM 40 MG/10ML
40 INJECTION, POWDER, LYOPHILIZED, FOR SOLUTION INTRAVENOUS ONCE
Status: COMPLETED | OUTPATIENT
Start: 2018-07-09 | End: 2018-07-09

## 2018-07-09 RX ORDER — SODIUM CHLORIDE 9 MG/ML
INJECTION, SOLUTION INTRAVENOUS CONTINUOUS
Status: DISCONTINUED | OUTPATIENT
Start: 2018-07-09 | End: 2018-07-09 | Stop reason: HOSPADM

## 2018-07-09 RX ADMIN — PANTOPRAZOLE SODIUM 40 MG: 40 INJECTION, POWDER, FOR SOLUTION INTRAVENOUS at 16:40

## 2018-07-09 RX ADMIN — SODIUM CHLORIDE: 9 INJECTION, SOLUTION INTRAVENOUS at 16:41

## 2018-07-09 ASSESSMENT — PAIN SCALES - GENERAL: PAINLEVEL_OUTOF10: 8

## 2018-07-09 ASSESSMENT — PAIN DESCRIPTION - FREQUENCY: FREQUENCY: INTERMITTENT

## 2018-07-09 ASSESSMENT — PAIN DESCRIPTION - ONSET: ONSET: GRADUAL

## 2018-07-09 ASSESSMENT — PAIN DESCRIPTION - DESCRIPTORS: DESCRIPTORS: SHARP;ACHING

## 2018-07-09 ASSESSMENT — PAIN DESCRIPTION - PROGRESSION: CLINICAL_PROGRESSION: GRADUALLY WORSENING

## 2018-07-09 ASSESSMENT — PAIN DESCRIPTION - PAIN TYPE: TYPE: ACUTE PAIN

## 2018-07-09 ASSESSMENT — PAIN DESCRIPTION - LOCATION: LOCATION: ABDOMEN

## 2018-07-09 ASSESSMENT — PAIN DESCRIPTION - ORIENTATION: ORIENTATION: RIGHT;LEFT;MID;UPPER
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
## 2018-07-09 NOTE — ED NOTES
Bed:  HALL-02  Expected date:   Expected time:   Means of arrival:   Comments:  Idalia Glass RN  07/09/18 5310

## 2018-07-17 ENCOUNTER — OFFICE VISIT (OUTPATIENT)
Dept: SURGERY | Age: 28
End: 2018-07-17
Payer: COMMERCIAL

## 2018-07-17 VITALS
HEART RATE: 82 BPM | DIASTOLIC BLOOD PRESSURE: 77 MMHG | RESPIRATION RATE: 17 BRPM | BODY MASS INDEX: 36.37 KG/M2 | OXYGEN SATURATION: 99 % | WEIGHT: 213 LBS | SYSTOLIC BLOOD PRESSURE: 105 MMHG | HEIGHT: 64 IN

## 2018-07-17 DIAGNOSIS — T07.XXXA MULTIPLE TRAUMA: ICD-10-CM

## 2018-07-17 DIAGNOSIS — Z09 FOLLOW UP: Primary | ICD-10-CM

## 2018-07-17 DIAGNOSIS — S06.0X1D CONCUSSION WITH LOSS OF CONSCIOUSNESS OF 30 MINUTES OR LESS, SUBSEQUENT ENCOUNTER: ICD-10-CM

## 2018-07-17 DIAGNOSIS — S16.1XXD STRAIN OF NECK MUSCLE, SUBSEQUENT ENCOUNTER: ICD-10-CM

## 2018-07-17 PROCEDURE — 99212 OFFICE O/P EST SF 10 MIN: CPT | Performed by: NURSE PRACTITIONER

## 2018-07-17 PROCEDURE — G8427 DOCREV CUR MEDS BY ELIG CLIN: HCPCS | Performed by: NURSE PRACTITIONER

## 2018-07-17 PROCEDURE — G8417 CALC BMI ABV UP PARAM F/U: HCPCS | Performed by: NURSE PRACTITIONER

## 2018-07-17 PROCEDURE — 4004F PT TOBACCO SCREEN RCVD TLK: CPT | Performed by: NURSE PRACTITIONER

## 2018-07-17 RX ORDER — METHOCARBAMOL 500 MG/1
500 TABLET, FILM COATED ORAL 3 TIMES DAILY
Qty: 30 TABLET | Refills: 0 | Status: SHIPPED | OUTPATIENT
Start: 2018-07-17 | End: 2018-07-27

## 2018-07-17 NOTE — PATIENT INSTRUCTIONS
[facility]Trauma Services  Additional Discharge Instructions    Call [phone] for any questions/concerns and for follow up appointment in 2 weeks. Please follow the instructions checked below:    ACTIVITY INSTRUCTIONS:  [x]Increase activity as tolerated    [x]Start to remove collar for extended periods of time. WOUND/DRESSING INSTRUCTIONS:  [x]May shower         MEDICATION INSTRUCTIONS:  [x]Take medication as prescribed. [x]You may take acetaminophen or Ibuprofen (over the counter) as per directions for mild pain.     WORK:  [x]You may return to work with light duty    Call physician for any of the following or for questions/concerns:   · Fever over 101° F    · Redness, swelling, hardness or warmth at the wound site (s)  · Unrelieved nausea/vomiting    · Foul smelling or cloudy drainage at the wound site (s)   · Unrelieved pain or increase in pain     · Increase in shortness of breath
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
## 2018-07-20 ENCOUNTER — HOSPITAL ENCOUNTER (OUTPATIENT)
Dept: PHYSICAL THERAPY | Age: 28
Setting detail: THERAPIES SERIES
Discharge: HOME | End: 2018-07-20
Payer: COMMERCIAL

## 2018-07-20 PROCEDURE — G8982 BODY POS GOAL STATUS: HCPCS | Performed by: PHYSICAL THERAPIST

## 2018-07-20 PROCEDURE — G8981 BODY POS CURRENT STATUS: HCPCS | Performed by: PHYSICAL THERAPIST

## 2018-07-20 PROCEDURE — 97162 PT EVAL MOD COMPLEX 30 MIN: CPT | Performed by: PHYSICAL THERAPIST

## 2018-07-20 ASSESSMENT — PAIN DESCRIPTION - ORIENTATION: ORIENTATION: RIGHT;LEFT

## 2018-07-20 ASSESSMENT — PAIN SCALES - GENERAL: PAINLEVEL_OUTOF10: 8

## 2018-07-20 ASSESSMENT — PAIN DESCRIPTION - DESCRIPTORS: DESCRIPTORS: ACHING;CONSTANT

## 2018-07-20 ASSESSMENT — PAIN DESCRIPTION - LOCATION: LOCATION: NECK;BACK

## 2018-07-20 ASSESSMENT — PAIN DESCRIPTION - PAIN TYPE: TYPE: ACUTE PAIN
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
## 2018-07-24 DIAGNOSIS — M54.2 NECK PAIN: Primary | ICD-10-CM

## 2018-07-25 ENCOUNTER — HOSPITAL ENCOUNTER (OUTPATIENT)
Dept: PHYSICAL THERAPY | Age: 28
Setting detail: THERAPIES SERIES
Discharge: HOME | End: 2018-07-25
Payer: COMMERCIAL

## 2018-07-25 PROCEDURE — G0283 ELEC STIM OTHER THAN WOUND: HCPCS

## 2018-07-25 PROCEDURE — 97110 THERAPEUTIC EXERCISES: CPT

## 2018-07-27 ENCOUNTER — HOSPITAL ENCOUNTER (OUTPATIENT)
Dept: PHYSICAL THERAPY | Age: 28
Setting detail: THERAPIES SERIES
Discharge: HOME | End: 2018-07-27
Payer: COMMERCIAL

## 2018-07-27 PROCEDURE — G0283 ELEC STIM OTHER THAN WOUND: HCPCS

## 2018-07-27 PROCEDURE — 97110 THERAPEUTIC EXERCISES: CPT

## 2018-07-27 NOTE — PROGRESS NOTES
Session:      See Weekly Progress Note: [x]  Yes  []  No  Next due:        Electronically signed by:  Sami Matthews

## 2018-07-30 ENCOUNTER — HOSPITAL ENCOUNTER (OUTPATIENT)
Dept: GENERAL RADIOLOGY | Age: 28
Discharge: HOME | End: 2018-08-01
Payer: COMMERCIAL

## 2018-07-30 ENCOUNTER — OFFICE VISIT (OUTPATIENT)
Dept: NEUROSURGERY | Age: 28
End: 2018-07-30
Payer: COMMERCIAL

## 2018-07-30 ENCOUNTER — HOSPITAL ENCOUNTER (OUTPATIENT)
Age: 28
Discharge: HOME | End: 2018-08-01
Payer: COMMERCIAL

## 2018-07-30 VITALS
HEART RATE: 74 BPM | HEIGHT: 64 IN | DIASTOLIC BLOOD PRESSURE: 77 MMHG | WEIGHT: 219 LBS | BODY MASS INDEX: 37.39 KG/M2 | SYSTOLIC BLOOD PRESSURE: 117 MMHG

## 2018-07-30 DIAGNOSIS — S16.1XXD STRAIN OF NECK MUSCLE, SUBSEQUENT ENCOUNTER: Primary | ICD-10-CM

## 2018-07-30 DIAGNOSIS — M54.2 NECK PAIN: ICD-10-CM

## 2018-07-30 PROCEDURE — G8427 DOCREV CUR MEDS BY ELIG CLIN: HCPCS | Performed by: PHYSICIAN ASSISTANT

## 2018-07-30 PROCEDURE — 4004F PT TOBACCO SCREEN RCVD TLK: CPT | Performed by: PHYSICIAN ASSISTANT

## 2018-07-30 PROCEDURE — G8417 CALC BMI ABV UP PARAM F/U: HCPCS | Performed by: PHYSICIAN ASSISTANT

## 2018-07-30 PROCEDURE — 99212 OFFICE O/P EST SF 10 MIN: CPT | Performed by: PHYSICIAN ASSISTANT

## 2018-07-31 ENCOUNTER — OFFICE VISIT (OUTPATIENT)
Dept: SURGERY | Age: 28
End: 2018-07-31
Payer: COMMERCIAL

## 2018-07-31 VITALS
HEART RATE: 102 BPM | WEIGHT: 220 LBS | DIASTOLIC BLOOD PRESSURE: 68 MMHG | OXYGEN SATURATION: 99 % | SYSTOLIC BLOOD PRESSURE: 113 MMHG | TEMPERATURE: 97.4 F | RESPIRATION RATE: 14 BRPM | HEIGHT: 64 IN | BODY MASS INDEX: 37.56 KG/M2

## 2018-07-31 DIAGNOSIS — S06.0X1D CONCUSSION WITH LOSS OF CONSCIOUSNESS OF 30 MINUTES OR LESS, SUBSEQUENT ENCOUNTER: ICD-10-CM
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
## 2018-07-31 PROCEDURE — 4004F PT TOBACCO SCREEN RCVD TLK: CPT | Performed by: NURSE PRACTITIONER

## 2018-07-31 PROCEDURE — 99213 OFFICE O/P EST LOW 20 MIN: CPT | Performed by: NURSE PRACTITIONER

## 2018-07-31 PROCEDURE — G8417 CALC BMI ABV UP PARAM F/U: HCPCS | Performed by: NURSE PRACTITIONER

## 2018-07-31 PROCEDURE — G8427 DOCREV CUR MEDS BY ELIG CLIN: HCPCS | Performed by: NURSE PRACTITIONER

## 2018-07-31 PROCEDURE — 99212 OFFICE O/P EST SF 10 MIN: CPT | Performed by: NURSE PRACTITIONER

## 2018-07-31 RX ORDER — METHOCARBAMOL 750 MG/1
750 TABLET, FILM COATED ORAL 4 TIMES DAILY
Qty: 40 TABLET | Refills: 0 | Status: SHIPPED | OUTPATIENT
Start: 2018-07-31 | End: 2018-08-10

## 2018-07-31 RX ORDER — LIDOCAINE 50 MG/G
OINTMENT TOPICAL
Qty: 30 G | Refills: 1 | Status: SHIPPED | OUTPATIENT
Start: 2018-07-31 | End: 2020-06-09
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
## 2018-08-01 ENCOUNTER — HOSPITAL ENCOUNTER (OUTPATIENT)
Dept: PHYSICAL THERAPY | Age: 28
Setting detail: THERAPIES SERIES
Discharge: HOME | End: 2018-08-01
Payer: COMMERCIAL

## 2018-08-01 PROCEDURE — G0283 ELEC STIM OTHER THAN WOUND: HCPCS | Performed by: PHYSICAL THERAPIST

## 2018-08-01 PROCEDURE — 97110 THERAPEUTIC EXERCISES: CPT | Performed by: PHYSICAL THERAPIST

## 2018-08-03 ENCOUNTER — HOSPITAL ENCOUNTER (OUTPATIENT)
Dept: PHYSICAL THERAPY | Age: 28
Setting detail: THERAPIES SERIES
Discharge: HOME | End: 2018-08-03
Payer: COMMERCIAL

## 2018-08-03 PROCEDURE — G0283 ELEC STIM OTHER THAN WOUND: HCPCS | Performed by: PHYSICAL THERAPIST

## 2018-08-03 PROCEDURE — 97110 THERAPEUTIC EXERCISES: CPT | Performed by: PHYSICAL THERAPIST

## 2018-08-14 ENCOUNTER — OFFICE VISIT (OUTPATIENT)
Dept: SURGERY | Age: 28
End: 2018-08-14
Payer: COMMERCIAL

## 2018-08-14 VITALS
RESPIRATION RATE: 14 BRPM | OXYGEN SATURATION: 98 % | HEIGHT: 64 IN | WEIGHT: 218.8 LBS | SYSTOLIC BLOOD PRESSURE: 120 MMHG | DIASTOLIC BLOOD PRESSURE: 87 MMHG | HEART RATE: 91 BPM | TEMPERATURE: 98 F | BODY MASS INDEX: 37.36 KG/M2

## 2018-08-14 DIAGNOSIS — M54.50 ACUTE BILATERAL LOW BACK PAIN WITHOUT SCIATICA: ICD-10-CM
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
## 2018-08-14 DIAGNOSIS — Z09 FOLLOW UP: Primary | ICD-10-CM

## 2018-08-14 DIAGNOSIS — S06.0X1D CONCUSSION WITH LOSS OF CONSCIOUSNESS OF 30 MINUTES OR LESS, SUBSEQUENT ENCOUNTER: ICD-10-CM

## 2018-08-14 DIAGNOSIS — S14.109D CONTUSION OF CERVICAL CORD, SUBSEQUENT ENCOUNTER (HCC): ICD-10-CM

## 2018-08-14 PROCEDURE — G8427 DOCREV CUR MEDS BY ELIG CLIN: HCPCS | Performed by: NURSE PRACTITIONER

## 2018-08-14 PROCEDURE — 99212 OFFICE O/P EST SF 10 MIN: CPT | Performed by: NURSE PRACTITIONER

## 2018-08-14 PROCEDURE — G8417 CALC BMI ABV UP PARAM F/U: HCPCS | Performed by: NURSE PRACTITIONER

## 2018-08-14 PROCEDURE — 99211 OFF/OP EST MAY X REQ PHY/QHP: CPT | Performed by: NURSE PRACTITIONER

## 2018-08-14 RX ORDER — HYDROCORTISONE 0.5 %
CREAM (GRAM) TOPICAL 3 TIMES DAILY PRN
Qty: 1 BOTTLE | Refills: 0 | Status: SHIPPED | OUTPATIENT
Start: 2018-08-14 | End: 2020-06-09

## 2018-08-14 NOTE — LETTER
[facility]   [address] [facility], [facility] [place]  [phone] (Fax)        8/14/2018    Patient: Molly Macdonald   YOB: 1990           To Whom it May Concern:     Molly Macdonald was seen in my clinic on 8/14/2018. Her clinic appointment was for 1:30 pm.  She is being discharged at about 2:15pm.      If you have any questions or concerns, please don't hesitate to call.     Sincerely,         Lukasz Hernandez RN MSN APRN-CNP [address]

## 2018-08-14 NOTE — PATIENT INSTRUCTIONS
[facility]  Trauma Services  Additional Discharge Instructions    Call [phone] for any questions/concerns. ACTIVITY INSTRUCTIONS:  [x]Increase activity as tolerated       MEDICATION INSTRUCTIONS:  [x]You may take acetaminophen or  Ibuprofen (over the counter) as per directions for mild pain.     WORK:  [x]You may return to work without restrictions     Call physician for any of the following or for questions/concerns:   · Fever over 101° F    · Redness, swelling, hardness or warmth at the wound site (s)  · Unrelieved nausea/vomiting    · Foul smelling or cloudy drainage at the wound site (s)   · Unrelieved pain or increase in pain     · Increase in shortness of breath

## 2018-08-14 NOTE — LETTER
[facility]   [address] [facility], [facility] [place] [address]  [phone] (Fax)        8/14/2018    Patient: Greg Greenfield   YOB: 1990           To Whom it May Concern:     Greg Greenfield was seen in my clinic on 8/14/2018. She may return to work on Tuesday August 14, 2018  with full duty. If you have any questions or concerns, please don't hesitate to call.     Sincerely,         Olga Parker RN MSN APRN-CNP [address]

## 2018-08-15 ENCOUNTER — HOSPITAL ENCOUNTER (OUTPATIENT)
Dept: PHYSICAL THERAPY | Age: 28
Setting detail: THERAPIES SERIES
Discharge: HOME | End: 2018-08-15
Payer: COMMERCIAL

## 2018-08-15 PROCEDURE — G0283 ELEC STIM OTHER THAN WOUND: HCPCS | Performed by: PHYSICAL THERAPIST

## 2018-08-15 PROCEDURE — 97110 THERAPEUTIC EXERCISES: CPT | Performed by: PHYSICAL THERAPIST

## 2018-08-17 ENCOUNTER — HOSPITAL ENCOUNTER (OUTPATIENT)
Dept: PHYSICAL THERAPY | Age: 28
Setting detail: THERAPIES SERIES
Discharge: HOME | End: 2018-08-17
Payer: COMMERCIAL

## 2018-08-17 PROCEDURE — 97110 THERAPEUTIC EXERCISES: CPT | Performed by: PHYSICAL THERAPIST

## 2018-08-17 PROCEDURE — G0283 ELEC STIM OTHER THAN WOUND: HCPCS | Performed by: PHYSICAL THERAPIST

## 2018-08-17 NOTE — PROGRESS NOTES
S:  pt presents to therapy for two of two scheduled visits this week; at week's end she tells PT that her back/neck is continues to feel  better overall; pain persists and does limit her at times but she is moving better; pain level given as 2/10; HEP going well per pt    O: performed the exercises/treatments as written in the flowsheet for the week ending 8/17/18;  LB/cervical AROM grossly 85%WNL for all ranges; strength B UE/LE's grossly 5/5 for all planes     A:  antonio tx well; pt able to perform all requested tasks with good form and pacing noted; verbal cuing needed at times calling into question performance of HEP overall; cervical/LB AROM stable since last week; endurance for all prolonged activities is GOOD; gait stable with normal mechanics noted     P:  cont with POC of stretching/strengthening for neck/LB with modalities as needed

## 2018-10-09 ENCOUNTER — HOSPITAL ENCOUNTER (EMERGENCY)
Age: 28
Discharge: HOME | End: 2018-10-09
Payer: COMMERCIAL

## 2018-10-09 VITALS
BODY MASS INDEX: 37.22 KG/M2 | HEIGHT: 64 IN | SYSTOLIC BLOOD PRESSURE: 126 MMHG | HEART RATE: 79 BPM | WEIGHT: 218 LBS | OXYGEN SATURATION: 98 % | RESPIRATION RATE: 16 BRPM | DIASTOLIC BLOOD PRESSURE: 77 MMHG | TEMPERATURE: 97.2 F

## 2018-10-09 DIAGNOSIS — K08.89 PAIN, DENTAL: Primary | ICD-10-CM

## 2018-10-09 PROCEDURE — 99282 EMERGENCY DEPT VISIT SF MDM: CPT

## 2018-10-09 RX ORDER — IBUPROFEN 800 MG/1
800 TABLET ORAL EVERY 8 HOURS PRN
Qty: 21 TABLET | Refills: 0 | Status: SHIPPED | OUTPATIENT
Start: 2018-10-09 | End: 2020-02-08

## 2018-10-09 RX ORDER — PENICILLIN V POTASSIUM 500 MG/1
500 TABLET ORAL 4 TIMES DAILY
Qty: 40 TABLET | Refills: 0 | Status: SHIPPED | OUTPATIENT
Start: 2018-10-09 | End: 2018-10-19

## 2018-10-09 ASSESSMENT — PAIN DESCRIPTION - LOCATION: LOCATION: TEETH

## 2018-10-09 ASSESSMENT — PAIN SCALES - GENERAL: PAINLEVEL_OUTOF10: 5

## 2018-10-09 ASSESSMENT — PAIN DESCRIPTION - PAIN TYPE: TYPE: ACUTE PAIN

## 2018-10-09 ASSESSMENT — PAIN DESCRIPTION - DESCRIPTORS: DESCRIPTORS: BURNING

## 2018-10-09 ASSESSMENT — PAIN DESCRIPTION - FREQUENCY: FREQUENCY: CONTINUOUS

## 2018-11-08 ENCOUNTER — HOSPITAL ENCOUNTER (OUTPATIENT)
Dept: PHYSICAL THERAPY | Age: 28
Setting detail: THERAPIES SERIES
Discharge: HOME | End: 2018-11-08
Payer: COMMERCIAL

## 2018-12-04 NOTE — CARE COORDINATION
CATHLEEN Discharge Planning:    SW met with patient and patients mom. Patient lives alone, but will be discharging to her mothers apartment. Patients mom lives in a one story apartment. Patient owns no dme, has no history of mari or hhc. Will await pt/ot evals. SW completed SBI and placed in soft chart. No referral is needed at this time. SW will continue to follow.  [facility]

## 2019-02-19 ENCOUNTER — HOSPITAL ENCOUNTER (EMERGENCY)
Age: 29
Discharge: HOME | End: 2019-02-19
Payer: COMMERCIAL

## 2019-02-19 VITALS
BODY MASS INDEX: 35.85 KG/M2 | SYSTOLIC BLOOD PRESSURE: 134 MMHG | DIASTOLIC BLOOD PRESSURE: 78 MMHG | HEIGHT: 64 IN | OXYGEN SATURATION: 98 % | HEART RATE: 86 BPM | WEIGHT: 210 LBS | RESPIRATION RATE: 12 BRPM | TEMPERATURE: 98.2 F

## 2019-02-19 DIAGNOSIS — R23.4 FISSURE IN SKIN OF FOOT: Primary | ICD-10-CM

## 2019-02-19 PROCEDURE — 99282 EMERGENCY DEPT VISIT SF MDM: CPT

## 2019-02-19 RX ORDER — CEPHALEXIN 500 MG/1
500 CAPSULE ORAL 3 TIMES DAILY
Qty: 15 CAPSULE | Refills: 0 | Status: SHIPPED | OUTPATIENT
Start: 2019-02-19 | End: 2019-02-24

## 2019-06-30 ENCOUNTER — HOSPITAL ENCOUNTER (EMERGENCY)
Age: 29
Discharge: HOME | End: 2019-06-30
Payer: COMMERCIAL

## 2019-06-30 VITALS
RESPIRATION RATE: 18 BRPM | BODY MASS INDEX: 38.98 KG/M2 | TEMPERATURE: 96.6 F | SYSTOLIC BLOOD PRESSURE: 118 MMHG | WEIGHT: 220 LBS | HEART RATE: 82 BPM | DIASTOLIC BLOOD PRESSURE: 80 MMHG | OXYGEN SATURATION: 99 % | HEIGHT: 63 IN

## 2019-06-30 DIAGNOSIS — Z51.89 VISIT FOR WOUND CHECK: Primary | ICD-10-CM

## 2019-06-30 PROCEDURE — 6370000000 HC RX 637 (ALT 250 FOR IP): Performed by: NURSE PRACTITIONER

## 2019-06-30 PROCEDURE — 99282 EMERGENCY DEPT VISIT SF MDM: CPT

## 2019-06-30 RX ORDER — DIAPER,BRIEF,INFANT-TODD,DISP
EACH MISCELLANEOUS ONCE
Status: COMPLETED | OUTPATIENT
Start: 2019-06-30 | End: 2019-06-30

## 2019-06-30 RX ORDER — CEPHALEXIN 500 MG/1
500 CAPSULE ORAL 3 TIMES DAILY
Qty: 21 CAPSULE | Refills: 0 | Status: SHIPPED | OUTPATIENT
Start: 2019-06-30 | End: 2019-07-07

## 2019-06-30 RX ORDER — BACITRACIN, NEOMYCIN, POLYMYXIN B 400; 3.5; 5 [USP'U]/G; MG/G; [USP'U]/G
OINTMENT TOPICAL
Qty: 30 G | Refills: 0 | Status: SHIPPED | OUTPATIENT
Start: 2019-06-30 | End: 2019-07-10

## 2019-06-30 RX ADMIN — BACITRACIN ZINC: 500 OINTMENT TOPICAL at 20:50

## 2019-09-18 ENCOUNTER — HOSPITAL ENCOUNTER (EMERGENCY)
Age: 29
Discharge: HOME | End: 2019-09-18
Attending: EMERGENCY MEDICINE
Payer: COMMERCIAL

## 2019-09-18 VITALS
RESPIRATION RATE: 16 BRPM | OXYGEN SATURATION: 97 % | SYSTOLIC BLOOD PRESSURE: 122 MMHG | HEIGHT: 64 IN | BODY MASS INDEX: 34.15 KG/M2 | WEIGHT: 200 LBS | TEMPERATURE: 97.6 F | DIASTOLIC BLOOD PRESSURE: 76 MMHG | HEART RATE: 80 BPM

## 2019-09-18 DIAGNOSIS — H65.01 RIGHT ACUTE SEROUS OTITIS MEDIA, RECURRENCE NOT SPECIFIED: ICD-10-CM

## 2019-09-18 DIAGNOSIS — J02.9 ACUTE PHARYNGITIS, UNSPECIFIED ETIOLOGY: Primary | ICD-10-CM

## 2019-09-18 PROCEDURE — 99282 EMERGENCY DEPT VISIT SF MDM: CPT

## 2019-09-18 RX ORDER — AMOXICILLIN 500 MG/1
500 CAPSULE ORAL 2 TIMES DAILY
Qty: 14 CAPSULE | Refills: 0 | Status: SHIPPED | OUTPATIENT
Start: 2019-09-18 | End: 2019-09-25

## 2019-09-18 ASSESSMENT — PAIN DESCRIPTION - LOCATION: LOCATION: THROAT

## 2019-09-18 ASSESSMENT — PAIN SCALES - GENERAL: PAINLEVEL_OUTOF10: 8

## 2019-09-18 ASSESSMENT — PAIN DESCRIPTION - PAIN TYPE: TYPE: ACUTE PAIN

## 2019-09-18 NOTE — ED PROVIDER NOTES
toxic in NAD  Head: NC/AT  Ears: There is a serous effusion with bubbles noted there is no evidence of any perforation there is some mild erythema the tympanic membranes  Eyes: PERRL, EOMI  Mouth: Oropharynx clear, handling secretions, no trismus caries are noted on the anterior surface of her teeth with erosion of the enamel. Erythema is noted to the bilateral tonsils with small pustular collections on the anterior surface of all tonsils there is no uvular enlargement or edema there is no deviation there retropharyngeal wall appears normal  Neck: Supple, full ROM, no meningeal signs a small high right-sided anterior lymph node is noted  Pulmonary: Lungs clear to auscultation bilaterally, no wheezes, rales, or rhonchi. Not in respiratory distress  Cardiovascular:  Regular rate and rhythm, no murmurs, gallops, or rubs. 2+ distal pulses  Abdomen: Soft, non tender, non distended,   Extremities: Moves all extremities x 4. Warm and well perfused  Skin: warm and dry without rash  Neurologic: GCS 15,  Psych: Normal Affect      ------------------------------ ED COURSE/MEDICAL DECISION MAKING----------------------  Medications - No data to display      Medical Decision Making:    Patient will be discharged on amoxicillin and told to follow-up with her PCP told to take ibuprofen or Tylenol. She is also instructed to follow-up in the dental clinic because of severe caries    Counseling: The emergency provider has spoken with the patient and discussed todays results, in addition to providing specific details for the plan of care and counseling regarding the diagnosis and prognosis. Questions are answered at this time and they are agreeable with the plan.      --------------------------------- IMPRESSION AND DISPOSITION ---------------------------------    IMPRESSION  1. Acute pharyngitis, unspecified etiology    2.  Right acute serous otitis media, recurrence not specified        DISPOSITION  Disposition: Discharge to

## 2019-09-19 ENCOUNTER — HOSPITAL ENCOUNTER (EMERGENCY)
Age: 29
Discharge: LEFT BEFORE DISCHARGE | End: 2019-09-19
Payer: COMMERCIAL

## 2019-09-19 VITALS
WEIGHT: 210 LBS | BODY MASS INDEX: 35.85 KG/M2 | RESPIRATION RATE: 16 BRPM | SYSTOLIC BLOOD PRESSURE: 135 MMHG | HEART RATE: 95 BPM | OXYGEN SATURATION: 96 % | DIASTOLIC BLOOD PRESSURE: 83 MMHG | HEIGHT: 64 IN | TEMPERATURE: 97.8 F

## 2019-09-19 LAB
ACETAMINOPHEN LEVEL: <5 MCG/ML (ref 10–30)
ALBUMIN SERPL-MCNC: 4.3 G/DL (ref 3.5–5.2)
ALP BLD-CCNC: 58 U/L (ref 35–104)
ALT SERPL-CCNC: 19 U/L (ref 0–32)
AMPHETAMINE SCREEN, URINE: NOT DETECTED
ANION GAP SERPL CALCULATED.3IONS-SCNC: 14 MMOL/L (ref 7–16)
AST SERPL-CCNC: 15 U/L (ref 0–31)
BACTERIA: ABNORMAL /HPF
BARBITURATE SCREEN URINE: NOT DETECTED
BASOPHILS ABSOLUTE: 0.04 E9/L (ref 0–0.2)
BASOPHILS RELATIVE PERCENT: 0.6 % (ref 0–2)
BENZODIAZEPINE SCREEN, URINE: NOT DETECTED
BILIRUB SERPL-MCNC: 0.6 MG/DL (ref 0–1.2)
BILIRUBIN URINE: ABNORMAL
BLOOD, URINE: ABNORMAL
BUN BLDV-MCNC: 15 MG/DL (ref 6–20)
CALCIUM SERPL-MCNC: 9.6 MG/DL (ref 8.6–10.2)
CANNABINOID SCREEN URINE: POSITIVE
CHLORIDE BLD-SCNC: 102 MMOL/L (ref 98–107)
CLARITY: CLEAR
CO2: 22 MMOL/L (ref 22–29)
COCAINE METABOLITE SCREEN URINE: POSITIVE
COLOR: YELLOW
CREAT SERPL-MCNC: 0.8 MG/DL (ref 0.5–1)
EOSINOPHILS ABSOLUTE: 0.06 E9/L (ref 0.05–0.5)
EOSINOPHILS RELATIVE PERCENT: 0.8 % (ref 0–6)
ETHANOL: <10 MG/DL (ref 0–0.08)
GFR AFRICAN AMERICAN: >60
GFR NON-AFRICAN AMERICAN: >60 ML/MIN/1.73
GLUCOSE BLD-MCNC: 94 MG/DL (ref 74–99)
GLUCOSE URINE: NEGATIVE MG/DL
HCG, URINE, POC: NEGATIVE
HCT VFR BLD CALC: 42.3 % (ref 34–48)
HEMOGLOBIN: 14.1 G/DL (ref 11.5–15.5)
IMMATURE GRANULOCYTES #: 0.03 E9/L
IMMATURE GRANULOCYTES %: 0.4 % (ref 0–5)
KETONES, URINE: 40 MG/DL
LEUKOCYTE ESTERASE, URINE: ABNORMAL
LYMPHOCYTES ABSOLUTE: 0.97 E9/L (ref 1.5–4)
LYMPHOCYTES RELATIVE PERCENT: 13.4 % (ref 20–42)
Lab: ABNORMAL
Lab: NORMAL
MCH RBC QN AUTO: 30.6 PG (ref 26–35)
MCHC RBC AUTO-ENTMCNC: 33.3 % (ref 32–34.5)
MCV RBC AUTO: 91.8 FL (ref 80–99.9)
METHADONE SCREEN, URINE: NOT DETECTED
MONOCYTES ABSOLUTE: 0.31 E9/L (ref 0.1–0.95)
MONOCYTES RELATIVE PERCENT: 4.3 % (ref 2–12)
NEGATIVE QC PASS/FAIL: NORMAL
NEUTROPHILS ABSOLUTE: 5.84 E9/L (ref 1.8–7.3)
NEUTROPHILS RELATIVE PERCENT: 80.5 % (ref 43–80)
NITRITE, URINE: NEGATIVE
OPIATE SCREEN URINE: NOT DETECTED
PDW BLD-RTO: 13 FL (ref 11.5–15)
PH UA: 6.5 (ref 5–9)
PHENCYCLIDINE SCREEN URINE: NOT DETECTED
PLATELET # BLD: 220 E9/L (ref 130–450)
PMV BLD AUTO: 10.9 FL (ref 7–12)
POSITIVE QC PASS/FAIL: NORMAL
POTASSIUM SERPL-SCNC: 4.1 MMOL/L (ref 3.5–5)
PROPOXYPHENE SCREEN: NOT DETECTED
PROTEIN UA: NEGATIVE MG/DL
RBC # BLD: 4.61 E12/L (ref 3.5–5.5)
RBC UA: ABNORMAL /HPF (ref 0–2)
SALICYLATE, SERUM: <0.3 MG/DL (ref 0–30)
SODIUM BLD-SCNC: 138 MMOL/L (ref 132–146)
SPECIFIC GRAVITY UA: 1.02 (ref 1–1.03)
TOTAL PROTEIN: 7.9 G/DL (ref 6.4–8.3)
TRICYCLIC ANTIDEPRESSANTS SCREEN SERUM: NEGATIVE NG/ML
UROBILINOGEN, URINE: 4 E.U./DL
WBC # BLD: 7.3 E9/L (ref 4.5–11.5)
WBC UA: ABNORMAL /HPF (ref 0–5)

## 2019-09-19 PROCEDURE — 80307 DRUG TEST PRSMV CHEM ANLYZR: CPT

## 2019-09-19 PROCEDURE — 81001 URINALYSIS AUTO W/SCOPE: CPT

## 2019-09-19 PROCEDURE — G0480 DRUG TEST DEF 1-7 CLASSES: HCPCS

## 2019-09-19 PROCEDURE — 4500000002 HC ER NO CHARGE

## 2019-09-19 PROCEDURE — 80053 COMPREHEN METABOLIC PANEL: CPT

## 2019-09-19 PROCEDURE — 36415 COLL VENOUS BLD VENIPUNCTURE: CPT

## 2019-09-19 PROCEDURE — 85025 COMPLETE CBC W/AUTO DIFF WBC: CPT

## 2019-09-19 NOTE — ED NOTES
Bed: 18B-18  Expected date:   Expected time:   Means of arrival:   Comments:  Triage Female     Ximena Mcgee RN  09/19/19 1940

## 2019-09-23 LAB
CANNABINOIDS CONF, URINE: 294 NG/ML
COCAINE, CONFIRM, URINE: >1000 NG/ML

## 2020-02-08 ENCOUNTER — HOSPITAL ENCOUNTER (EMERGENCY)
Age: 30
Discharge: HOME | End: 2020-02-08
Payer: COMMERCIAL

## 2020-02-08 ENCOUNTER — APPOINTMENT (OUTPATIENT)
Dept: GENERAL RADIOLOGY | Age: 30
End: 2020-02-08
Payer: COMMERCIAL

## 2020-02-08 VITALS
TEMPERATURE: 98.5 F | HEART RATE: 97 BPM | BODY MASS INDEX: 35.85 KG/M2 | OXYGEN SATURATION: 97 % | DIASTOLIC BLOOD PRESSURE: 97 MMHG | RESPIRATION RATE: 18 BRPM | HEIGHT: 64 IN | WEIGHT: 210 LBS | SYSTOLIC BLOOD PRESSURE: 146 MMHG

## 2020-02-08 LAB
HCG, URINE, POC: NEGATIVE
INFLUENZA A BY PCR: NOT DETECTED
INFLUENZA B BY PCR: NOT DETECTED
Lab: NORMAL
NEGATIVE QC PASS/FAIL: NORMAL
POSITIVE QC PASS/FAIL: NORMAL

## 2020-02-08 PROCEDURE — 71046 X-RAY EXAM CHEST 2 VIEWS: CPT

## 2020-02-08 PROCEDURE — 87502 INFLUENZA DNA AMP PROBE: CPT

## 2020-02-08 PROCEDURE — 99283 EMERGENCY DEPT VISIT LOW MDM: CPT

## 2020-02-08 RX ORDER — BROMPHENIRAMINE MALEATE, PSEUDOEPHEDRINE HYDROCHLORIDE, AND DEXTROMETHORPHAN HYDROBROMIDE 2; 30; 10 MG/5ML; MG/5ML; MG/5ML
5 SYRUP ORAL 4 TIMES DAILY PRN
Qty: 120 ML | Refills: 0 | Status: SHIPPED | OUTPATIENT
Start: 2020-02-08 | End: 2020-02-13

## 2020-02-08 RX ORDER — AZITHROMYCIN 250 MG/1
TABLET, FILM COATED ORAL
Qty: 1 PACKET | Refills: 0 | Status: SHIPPED | OUTPATIENT
Start: 2020-02-08 | End: 2020-02-18

## 2020-02-08 RX ORDER — IBUPROFEN 800 MG/1
800 TABLET ORAL EVERY 6 HOURS PRN
Qty: 21 TABLET | Refills: 0 | Status: SHIPPED | OUTPATIENT
Start: 2020-02-08 | End: 2020-05-21 | Stop reason: ALTCHOICE

## 2020-02-08 ASSESSMENT — PAIN SCALES - GENERAL: PAINLEVEL_OUTOF10: 5

## 2020-02-21 ENCOUNTER — HOSPITAL ENCOUNTER (EMERGENCY)
Age: 30
Discharge: HOME | End: 2020-02-21
Attending: EMERGENCY MEDICINE
Payer: COMMERCIAL

## 2020-02-21 ENCOUNTER — APPOINTMENT (OUTPATIENT)
Dept: CT IMAGING | Age: 30
End: 2020-02-21
Payer: COMMERCIAL

## 2020-02-21 VITALS
TEMPERATURE: 96.9 F | RESPIRATION RATE: 18 BRPM | HEIGHT: 64 IN | BODY MASS INDEX: 35.85 KG/M2 | WEIGHT: 210 LBS | HEART RATE: 90 BPM | DIASTOLIC BLOOD PRESSURE: 79 MMHG | OXYGEN SATURATION: 98 % | SYSTOLIC BLOOD PRESSURE: 120 MMHG

## 2020-02-21 LAB
ANION GAP SERPL CALCULATED.3IONS-SCNC: 10 MMOL/L (ref 7–16)
BUN BLDV-MCNC: 16 MG/DL (ref 6–20)
CALCIUM SERPL-MCNC: 9.7 MG/DL (ref 8.6–10.2)
CHLORIDE BLD-SCNC: 102 MMOL/L (ref 98–107)
CO2: 28 MMOL/L (ref 22–29)
CREAT SERPL-MCNC: 0.7 MG/DL (ref 0.5–1)
GFR AFRICAN AMERICAN: >60
GFR AFRICAN AMERICAN: >60
GFR NON-AFRICAN AMERICAN: >60 ML/MIN/1.73
GFR NON-AFRICAN AMERICAN: >60 ML/MIN/1.73
GLUCOSE BLD-MCNC: 80 MG/DL (ref 74–99)
GLUCOSE BLD-MCNC: 86 MG/DL (ref 74–99)
HCG, URINE, POC: NEGATIVE
Lab: NORMAL
NEGATIVE QC PASS/FAIL: NORMAL
PERFORMED ON: NORMAL
POC CHLORIDE: 107 MMOL/L (ref 100–108)
POC CREATININE: 0.7 MG/DL (ref 0.5–1)
POC POTASSIUM: 3.8 MMOL/L (ref 3.5–5)
POC SODIUM: 143 MMOL/L (ref 132–146)
POSITIVE QC PASS/FAIL: NORMAL
POTASSIUM REFLEX MAGNESIUM: 4.2 MMOL/L (ref 3.5–5)
SODIUM BLD-SCNC: 140 MMOL/L (ref 132–146)

## 2020-02-21 PROCEDURE — 6360000004 HC RX CONTRAST MEDICATION: Performed by: RADIOLOGY

## 2020-02-21 PROCEDURE — 99284 EMERGENCY DEPT VISIT MOD MDM: CPT

## 2020-02-21 PROCEDURE — 82947 ASSAY GLUCOSE BLOOD QUANT: CPT

## 2020-02-21 PROCEDURE — 84132 ASSAY OF SERUM POTASSIUM: CPT

## 2020-02-21 PROCEDURE — 84295 ASSAY OF SERUM SODIUM: CPT

## 2020-02-21 PROCEDURE — 2580000003 HC RX 258: Performed by: RADIOLOGY

## 2020-02-21 PROCEDURE — 82565 ASSAY OF CREATININE: CPT

## 2020-02-21 PROCEDURE — 82435 ASSAY OF BLOOD CHLORIDE: CPT

## 2020-02-21 PROCEDURE — 70491 CT SOFT TISSUE NECK W/DYE: CPT

## 2020-02-21 PROCEDURE — 80048 BASIC METABOLIC PNL TOTAL CA: CPT

## 2020-02-21 PROCEDURE — 36415 COLL VENOUS BLD VENIPUNCTURE: CPT

## 2020-02-21 RX ORDER — SODIUM CHLORIDE 0.9 % (FLUSH) 0.9 %
10 SYRINGE (ML) INJECTION ONCE
Status: COMPLETED | OUTPATIENT
Start: 2020-02-21 | End: 2020-02-21

## 2020-02-21 RX ORDER — NAPROXEN 500 MG/1
500 TABLET ORAL 2 TIMES DAILY WITH MEALS
Qty: 28 TABLET | Refills: 0 | Status: SHIPPED | OUTPATIENT
Start: 2020-02-21 | End: 2020-06-09

## 2020-02-21 RX ADMIN — Medication 10 ML: at 14:00

## 2020-02-21 RX ADMIN — IOPAMIDOL 90 ML: 755 INJECTION, SOLUTION INTRAVENOUS at 14:00

## 2020-02-21 NOTE — ED PROVIDER NOTES
ED Attending  CC: No  HPI:  2/21/20,   Time: 1:15 PM       Ishaan Swanson is a 34 y.o. female presenting to the ED for pain to right side of her neck after coughing this morning. She coughs at home and have a\" funny feeling\" to the right side of her neck that she describes as feeling like a pulled muscle. She denies chest pain, shortness of breath, abdominal pain. She denies nausea, vomiting, diarrhea. She denies fever, chills, or recent illness. The complaint has been persistent, moderate in severity, and worsened by nothing. Review of Systems:   Pertinent positives and negatives are stated within HPI, all other systems reviewed and are negative.          --------------------------------------------- PAST HISTORY ---------------------------------------------  Past Medical History:  has a past medical history of Herpes genitalia, Paranoid schizophrenia ([facility] [address] [facility] and Ulcer. Past Surgical History:  has no past surgical history on file. Social History:  reports that she has been smoking cigarettes. She has been smoking about 1.00 pack per day. She has never used smokeless tobacco. She reports that she does not drink alcohol or use drugs. Family History: family history is not on file. The patients home medications have been reviewed. Allergies: Cherry flavor and Strawberry flavor        ---------------------------------------------------PHYSICAL EXAM--------------------------------------    Constitutional/General: Alert and oriented x3, well appearing, non toxic in NAD  Head: Normocephalic and atraumatic  Eyes: PERRL, EOMI, conjunctive normal, sclera non icteric  Mouth: Oropharynx clear, handling secretions, no trismus, no asymmetry of the posterior oropharynx or uvular edema  Neck: Supple, full ROM, non tender to palpation in the midline, no stridor, no crepitus, no meningeal signs  Respiratory: Lungs clear to auscultation bilaterally, no wheezes, rales, or rhonchi.  Not in Warm.        Consultations:             none    Critical Care: none        Counseling: The emergency provider has spoken with the patient and discussed todays results, in addition to providing specific details for the plan of care and counseling regarding the diagnosis and prognosis. Questions are answered at this time and they are agreeable with the plan.       --------------------------------- IMPRESSION AND DISPOSITION ---------------------------------    IMPRESSION  1. Neck pain    2. Strain of neck muscle, initial encounter    3. Enlarged lymph node in neck        DISPOSITION  Disposition: Discharge to home  Patient condition is stable    NOTE: This report was transcribed using voice recognition software.  Every effort was made to ensure accuracy; however, inadvertent computerized transcription errors may be present     AKHIL Fernandez - CNP  02/21/20 [id number]

## 2020-02-26 ENCOUNTER — APPOINTMENT (OUTPATIENT)
Dept: GENERAL RADIOLOGY | Age: 30
End: 2020-02-26
Payer: COMMERCIAL

## 2020-02-26 ENCOUNTER — HOSPITAL ENCOUNTER (EMERGENCY)
Age: 30
Discharge: HOME | End: 2020-02-26
Attending: EMERGENCY MEDICINE
Payer: COMMERCIAL

## 2020-02-26 VITALS
HEIGHT: 64 IN | TEMPERATURE: 98 F | HEART RATE: 70 BPM | BODY MASS INDEX: 25.61 KG/M2 | OXYGEN SATURATION: 99 % | RESPIRATION RATE: 20 BRPM | SYSTOLIC BLOOD PRESSURE: 120 MMHG | WEIGHT: 150 LBS | DIASTOLIC BLOOD PRESSURE: 70 MMHG

## 2020-02-26 LAB
ACETAMINOPHEN LEVEL: <5 MCG/ML (ref 10–30)
ALBUMIN SERPL-MCNC: 4.4 G/DL (ref 3.5–5.2)
ALP BLD-CCNC: 51 U/L (ref 35–104)
ALT SERPL-CCNC: 32 U/L (ref 0–32)
AMPHETAMINE SCREEN, URINE: NOT DETECTED
ANION GAP SERPL CALCULATED.3IONS-SCNC: 13 MMOL/L (ref 7–16)
AST SERPL-CCNC: 34 U/L (ref 0–31)
BARBITURATE SCREEN URINE: NOT DETECTED
BENZODIAZEPINE SCREEN, URINE: NOT DETECTED
BILIRUB SERPL-MCNC: 0.3 MG/DL (ref 0–1.2)
BUN BLDV-MCNC: 17 MG/DL (ref 6–20)
CALCIUM SERPL-MCNC: 9.5 MG/DL (ref 8.6–10.2)
CANNABINOID SCREEN URINE: POSITIVE
CHLORIDE BLD-SCNC: 101 MMOL/L (ref 98–107)
CO2: 23 MMOL/L (ref 22–29)
COCAINE METABOLITE SCREEN URINE: NOT DETECTED
CREAT SERPL-MCNC: 0.7 MG/DL (ref 0.5–1)
ETHANOL: <10 MG/DL (ref 0–0.08)
FENTANYL SCREEN, URINE: NOT DETECTED
GFR AFRICAN AMERICAN: >60
GFR NON-AFRICAN AMERICAN: >60 ML/MIN/1.73
GLUCOSE BLD-MCNC: 105 MG/DL (ref 74–99)
HCG, URINE, POC: NEGATIVE
HCT VFR BLD CALC: 38.7 % (ref 34–48)
HEMOGLOBIN: 12.9 G/DL (ref 11.5–15.5)
Lab: ABNORMAL
Lab: NORMAL
MCH RBC QN AUTO: 29.9 PG (ref 26–35)
MCHC RBC AUTO-ENTMCNC: 33.3 % (ref 32–34.5)
MCV RBC AUTO: 89.8 FL (ref 80–99.9)
METHADONE SCREEN, URINE: NOT DETECTED
NEGATIVE QC PASS/FAIL: NORMAL
OPIATE SCREEN URINE: NOT DETECTED
OXYCODONE URINE: NOT DETECTED
PDW BLD-RTO: 12.6 FL (ref 11.5–15)
PHENCYCLIDINE SCREEN URINE: NOT DETECTED
PLATELET # BLD: 313 E9/L (ref 130–450)
PMV BLD AUTO: 10.2 FL (ref 7–12)
POSITIVE QC PASS/FAIL: NORMAL
POTASSIUM SERPL-SCNC: 3.8 MMOL/L (ref 3.5–5)
RBC # BLD: 4.31 E12/L (ref 3.5–5.5)
SALICYLATE, SERUM: <0.3 MG/DL (ref 0–30)
SODIUM BLD-SCNC: 137 MMOL/L (ref 132–146)
TOTAL PROTEIN: 7.8 G/DL (ref 6.4–8.3)
TRICYCLIC ANTIDEPRESSANTS SCREEN SERUM: NEGATIVE NG/ML
TROPONIN: <0.01 NG/ML (ref 0–0.03)
WBC # BLD: 8.1 E9/L (ref 4.5–11.5)

## 2020-02-26 PROCEDURE — 80053 COMPREHEN METABOLIC PANEL: CPT

## 2020-02-26 PROCEDURE — 96375 TX/PRO/DX INJ NEW DRUG ADDON: CPT

## 2020-02-26 PROCEDURE — 93005 ELECTROCARDIOGRAM TRACING: CPT | Performed by: EMERGENCY MEDICINE

## 2020-02-26 PROCEDURE — 80307 DRUG TEST PRSMV CHEM ANLYZR: CPT

## 2020-02-26 PROCEDURE — 96374 THER/PROPH/DIAG INJ IV PUSH: CPT

## 2020-02-26 PROCEDURE — G0480 DRUG TEST DEF 1-7 CLASSES: HCPCS

## 2020-02-26 PROCEDURE — 6360000002 HC RX W HCPCS: Performed by: EMERGENCY MEDICINE

## 2020-02-26 PROCEDURE — 2500000003 HC RX 250 WO HCPCS: Performed by: EMERGENCY MEDICINE

## 2020-02-26 PROCEDURE — 99283 EMERGENCY DEPT VISIT LOW MDM: CPT

## 2020-02-26 PROCEDURE — 36415 COLL VENOUS BLD VENIPUNCTURE: CPT

## 2020-02-26 PROCEDURE — 85027 COMPLETE CBC AUTOMATED: CPT

## 2020-02-26 PROCEDURE — 84484 ASSAY OF TROPONIN QUANT: CPT

## 2020-02-26 PROCEDURE — 71045 X-RAY EXAM CHEST 1 VIEW: CPT

## 2020-02-26 RX ORDER — DIPHENHYDRAMINE HCL 25 MG
25 CAPSULE ORAL EVERY 6 HOURS PRN
Qty: 20 CAPSULE | Refills: 0 | Status: SHIPPED | OUTPATIENT
Start: 2020-02-26 | End: 2020-03-02

## 2020-02-26 RX ORDER — EPINEPHRINE 0.3 MG/.3ML
0.3 INJECTION SUBCUTANEOUS ONCE
Qty: 2 EACH | Refills: 0 | Status: SHIPPED | OUTPATIENT
Start: 2020-02-26 | End: 2022-04-18

## 2020-02-26 RX ORDER — FAMOTIDINE 20 MG/1
20 TABLET, FILM COATED ORAL 2 TIMES DAILY
Qty: 14 TABLET | Refills: 0 | Status: SHIPPED | OUTPATIENT
Start: 2020-02-26 | End: 2020-06-09

## 2020-02-26 RX ORDER — METHYLPREDNISOLONE SODIUM SUCCINATE 125 MG/2ML
125 INJECTION, POWDER, LYOPHILIZED, FOR SOLUTION INTRAMUSCULAR; INTRAVENOUS ONCE
Status: COMPLETED | OUTPATIENT
Start: 2020-02-26 | End: 2020-02-26

## 2020-02-26 RX ORDER — METHYLPREDNISOLONE 4 MG/1
TABLET ORAL
Qty: 21 TABLET | Refills: 0 | Status: SHIPPED | OUTPATIENT
Start: 2020-02-26 | End: 2020-03-03

## 2020-02-26 RX ADMIN — METHYLPREDNISOLONE SODIUM SUCCINATE 125 MG: 125 INJECTION, POWDER, FOR SOLUTION INTRAMUSCULAR; INTRAVENOUS at 20:43

## 2020-02-26 RX ADMIN — FAMOTIDINE 20 MG: 10 INJECTION INTRAVENOUS at 20:44

## 2020-02-27 NOTE — ED PROVIDER NOTES
HPI:  2/26/20, Time: 8:32 PM         Andrew Madrigal is a 34 y.o. female presenting to the ED for allergic reaction, beginning a short time ago. The complaint has been persistent, moderate in severity, and worsened by vaping. Patient reports she was vaping strawberry flavor cigarette and reports a reaction to it. Patient reports she is allergic to strawberries. Patient reporting some tightness in her chest.  She reports no abdominal pain or vomiting there is no fever chills or cough. Patient did take Benadryl prior to arrival.  Patient reporting no headache. She reports no leg pain or swelling. Patient reports similar symptoms in the past    ROS:   Pertinent positives and negatives are stated within HPI, all other systems reviewed and are negative.  --------------------------------------------- PAST HISTORY ---------------------------------------------  Past Medical History:  has a past medical history of Herpes genitalia, Paranoid schizophrenia ([facility] [address] Seizures ([facility] [address] and Ulcer. Past Surgical History:  has no past surgical history on file. Social History:  reports that she has been smoking cigarettes. She has been smoking about 0.25 packs per day. She has never used smokeless tobacco. She reports that she does not drink alcohol or use drugs. Family History: family history is not on file. The patients home medications have been reviewed. Allergies: Cherry flavor and Strawberry flavor    ---------------------------------------------------PHYSICAL EXAM--------------------------------------    Constitutional/General: Alert and oriented x3, well appearing, non toxic in NAD  Head: Normocephalic and atraumatic  Eyes: PERRL, EOMI  Mouth: Oropharynx clear, handling secretions, no trismus  Neck: Supple, full ROM, non tender to palpation in the midline, no stridor, no crepitus, no meningeal signs  Pulmonary: Lungs clear to auscultation bilaterally, no wheezes, rales, or rhonchi.  Not in respiratory Acetaminophen Level <5.0 (L) 10.0 - 07.2 mcg/mL    Salicylate, Serum <2.9 0.0 - 30.0 mg/dL    TCA Scrn NEGATIVE Cutoff:300 ng/mL   Urine Drug Screen   Result Value Ref Range    Amphetamine Screen, Urine NOT DETECTED Negative <1000 ng/mL    Barbiturate Screen, Ur NOT DETECTED Negative < 200 ng/mL    Benzodiazepine Screen, Urine NOT DETECTED Negative < 200 ng/mL    Cannabinoid Scrn, Ur POSITIVE (A) Negative < 50ng/mL    Cocaine Metabolite Screen, Urine NOT DETECTED Negative < 300 ng/mL    Opiate Scrn, Ur NOT DETECTED Negative < 300ng/mL    PCP Screen, Urine NOT DETECTED Negative < 25 ng/mL    Methadone Screen, Urine NOT DETECTED Negative <300 ng/mL    Oxycodone Urine NOT DETECTED Negative <100 ng/mL    FENTANYL SCREEN, URINE NOT DETECTED Negative <1 ng/mL    Drug Screen Comment: see below        RADIOLOGY:  Interpreted by Radiologist.  XR CHEST PORTABLE   Final Result      Negative one view chest.            EKG:  This EKG is signed and interpreted by me. Rate: 81  Rhythm: Sinus  Interpretation: no acute changes  Comparison: no previous EKG available        ------------------------- NURSING NOTES AND VITALS REVIEWED ---------------------------   The nursing notes within the ED encounter and vital signs as below have been reviewed by myself. /81   Pulse 75   Temp 98 °F (36.7 °C) (Temporal)   Resp 16   Ht 5' 4\" (1.626 m)   Wt 150 lb (68 kg)   LMP 02/07/2020   SpO2 100%   BMI 25.75 kg/m²   Oxygen Saturation Interpretation: Normal    The patients available past medical records and past encounters were reviewed.         ------------------------------ ED COURSE/MEDICAL DECISION MAKING----------------------  Medications   methylPREDNISolone sodium (SOLU-MEDROL) injection 125 mg (125 mg Intravenous Given 2/26/20 2043)   famotidine (PEPCID) injection 20 mg (20 mg Intravenous Given 2/26/20 2044)             Medical Decision Making:        Re-Evaluations:             Patient rechecked again at 10:20 PM.

## 2020-02-28 LAB
EKG ATRIAL RATE: 81 BPM
EKG P AXIS: 61 DEGREES
EKG P-R INTERVAL: 156 MS
EKG Q-T INTERVAL: 386 MS
EKG QRS DURATION: 94 MS
EKG QTC CALCULATION (BAZETT): 448 MS
EKG R AXIS: 71 DEGREES
EKG T AXIS: 54 DEGREES
EKG VENTRICULAR RATE: 81 BPM

## 2020-02-28 PROCEDURE — 93010 ELECTROCARDIOGRAM REPORT: CPT | Performed by: INTERNAL MEDICINE

## 2020-03-07 ENCOUNTER — APPOINTMENT (OUTPATIENT)
Dept: GENERAL RADIOLOGY | Age: 30
End: 2020-03-07
Payer: COMMERCIAL

## 2020-03-07 ENCOUNTER — HOSPITAL ENCOUNTER (EMERGENCY)
Age: 30
Discharge: LEFT BEFORE DISCHARGE | End: 2020-03-07
Attending: EMERGENCY MEDICINE
Payer: COMMERCIAL

## 2020-03-07 VITALS
WEIGHT: 150 LBS | HEIGHT: 64 IN | DIASTOLIC BLOOD PRESSURE: 78 MMHG | OXYGEN SATURATION: 100 % | HEART RATE: 75 BPM | BODY MASS INDEX: 25.61 KG/M2 | TEMPERATURE: 98.2 F | SYSTOLIC BLOOD PRESSURE: 130 MMHG | RESPIRATION RATE: 18 BRPM

## 2020-03-07 PROCEDURE — 74022 RADEX COMPL AQT ABD SERIES: CPT

## 2020-03-07 PROCEDURE — 99285 EMERGENCY DEPT VISIT HI MDM: CPT

## 2020-03-07 ASSESSMENT — PAIN DESCRIPTION - PAIN TYPE: TYPE: ACUTE PAIN

## 2020-03-07 ASSESSMENT — PAIN DESCRIPTION - LOCATION: LOCATION: CHEST

## 2020-03-07 ASSESSMENT — PAIN DESCRIPTION - ORIENTATION: ORIENTATION: MID

## 2020-03-07 ASSESSMENT — PAIN SCALES - GENERAL: PAINLEVEL_OUTOF10: 8

## 2020-03-10 ENCOUNTER — HOSPITAL ENCOUNTER (EMERGENCY)
Age: 30
Discharge: LEFT BEFORE DISCHARGE | End: 2020-03-10
Attending: EMERGENCY MEDICINE
Payer: COMMERCIAL

## 2020-03-10 VITALS
RESPIRATION RATE: 16 BRPM | SYSTOLIC BLOOD PRESSURE: 127 MMHG | HEART RATE: 73 BPM | BODY MASS INDEX: 25.75 KG/M2 | OXYGEN SATURATION: 100 % | TEMPERATURE: 97.1 F | DIASTOLIC BLOOD PRESSURE: 85 MMHG | WEIGHT: 150 LBS

## 2020-03-10 PROCEDURE — 6370000000 HC RX 637 (ALT 250 FOR IP): Performed by: PHYSICIAN ASSISTANT

## 2020-03-10 PROCEDURE — 99283 EMERGENCY DEPT VISIT LOW MDM: CPT

## 2020-03-10 RX ORDER — MAGNESIUM HYDROXIDE/ALUMINUM HYDROXICE/SIMETHICONE 120; 1200; 1200 MG/30ML; MG/30ML; MG/30ML
15 SUSPENSION ORAL ONCE
Status: COMPLETED | OUTPATIENT
Start: 2020-03-10 | End: 2020-03-10

## 2020-03-10 RX ADMIN — ALUMINUM HYDROXIDE, MAGNESIUM HYDROXIDE, AND SIMETHICONE 15 ML: 200; 200; 20 SUSPENSION ORAL at 19:14

## 2020-03-10 NOTE — ED PROVIDER NOTES
Medications     Discharge Medication List as of 3/10/2020  8:26 PM        Electronically signed by JOSE Willis   DD: 3/10/20  **This report was transcribed using voice recognition software. Every effort was made to ensure accuracy; however, inadvertent computerized transcription errors may be present.   END OF ED PROVIDER NOTE       Aj Willis  03/10/20 2027

## 2020-05-21 ENCOUNTER — APPOINTMENT (OUTPATIENT)
Dept: ULTRASOUND IMAGING | Age: 30
End: 2020-05-21
Payer: COMMERCIAL

## 2020-05-21 ENCOUNTER — HOSPITAL ENCOUNTER (EMERGENCY)
Age: 30
Discharge: HOME | End: 2020-05-21
Payer: COMMERCIAL

## 2020-05-21 ENCOUNTER — APPOINTMENT (OUTPATIENT)
Dept: GENERAL RADIOLOGY | Age: 30
End: 2020-05-21
Payer: COMMERCIAL

## 2020-05-21 VITALS
TEMPERATURE: 98.1 F | OXYGEN SATURATION: 99 % | WEIGHT: 220 LBS | DIASTOLIC BLOOD PRESSURE: 83 MMHG | HEIGHT: 64 IN | BODY MASS INDEX: 37.56 KG/M2 | RESPIRATION RATE: 16 BRPM | HEART RATE: 71 BPM | SYSTOLIC BLOOD PRESSURE: 122 MMHG

## 2020-05-21 PROCEDURE — 73610 X-RAY EXAM OF ANKLE: CPT

## 2020-05-21 PROCEDURE — 6370000000 HC RX 637 (ALT 250 FOR IP): Performed by: NURSE PRACTITIONER

## 2020-05-21 PROCEDURE — 93971 EXTREMITY STUDY: CPT

## 2020-05-21 PROCEDURE — 99283 EMERGENCY DEPT VISIT LOW MDM: CPT

## 2020-05-21 RX ORDER — IBUPROFEN 800 MG/1
800 TABLET ORAL EVERY 8 HOURS PRN
Qty: 30 TABLET | Refills: 0 | Status: SHIPPED | OUTPATIENT
Start: 2020-05-21 | End: 2020-11-17

## 2020-05-21 RX ORDER — HYDROCODONE BITARTRATE AND ACETAMINOPHEN 5; 325 MG/1; MG/1
1 TABLET ORAL ONCE
Status: COMPLETED | OUTPATIENT
Start: 2020-05-21 | End: 2020-05-21

## 2020-05-21 RX ADMIN — HYDROCODONE BITARTRATE AND ACETAMINOPHEN 1 TABLET: 5; 325 TABLET ORAL at 18:19

## 2020-05-21 ASSESSMENT — PAIN SCALES - GENERAL
PAINLEVEL_OUTOF10: 7
PAINLEVEL_OUTOF10: 7

## 2020-05-21 ASSESSMENT — PAIN DESCRIPTION - LOCATION: LOCATION: LEG

## 2020-05-22 NOTE — ED PROVIDER NOTES
[facility]     Department of Emergency Medicine   ED  Provider Note  Admit Date/RoomTime: 5/21/2020  5:23 PM  ED Room: 31/31    Chief Complaint   Leg Swelling (left leg swelling x1 week, denies any injury )    History of Present Illness   Source of history provided by:  patient. History/Exam Limitations: none. Lindsay Santiago is a 34 y.o. old female who has a past medical history of:   Past Medical History:   Diagnosis Date    Herpes genitalia     [facility]an[facility] ([facility]     Seizures ([facility]     Ulcer     presents to the emergency department by private vehicle, for gradual onset of Left lower extremity pain and swelling, which occured 1 week(s) prior to arrival.  Previous pain/swelling to above mentioned area no. Since onset the symptoms have been gradually worsening and mild-moderate in severity. Associated Signs & Symptoms: none    []  Fever     []  Cough     []  Dyspnea     []  Hemoptysis     []  Chest Pain     Wells' Score Criteria for DVT: (possible score ? 2 to 9)      Active cancer (treatment within last 6 months or palliative) no (0)     Calf swelling ? 3 cm compared to asymptomatic calf (measured 10 cm             below tibial tuberosity) yes (1)     Swollen unilateral superficial veins (non-varicose, in symptomatic leg) no (0)     Unilateral pitting edema (in symptomatic leg): no (0)     Previous documented DVT: no (0)     Swelling of entire leg yes (1)      Localized tenderness along the deep venous system yes (1)     Paralysis, paresis, or recent cast immobilization of lower extremities no (0)     Recently bedridden ? 3 days, or major surgery requiring regional or                    general anesthetic in the past 12 weeks no (0)     Alternative diagnosis at least as likely no (0)     TOTAL SCORE 3     * Those with Wells scores of two or more have a 28% chance of having DVT, those with    a lower score have 6% odds.    ** Alternatively, Wells scores can be categorized as high if greater than two, moderate if      one or two, and low if less than one, with likelihoods of 53%, 17%, and 5%     respectively. ROS    Pertinent positives and negatives are stated within HPI, all other systems reviewed and are negative. Past Surgical History:  has no past surgical history on file. Social History:  reports that she has been smoking cigarettes. She has been smoking about 0.25 packs per day. She has never used smokeless tobacco. She reports that she does not drink alcohol or use drugs. Family History: family history is not on file. Allergies: Cherry flavor and Strawberry flavor    Physical Exam          ED Triage Vitals   BP Temp Temp src Pulse Resp SpO2 Height Weight   05/21/20 1717 05/21/20 1714 -- 05/21/20 1706 05/21/20 1714 05/21/20 1706 05/21/20 1714 05/21/20 1714   128/79 98.1 °F (36.7 °C)  88 14 98 % 5' 4\" (1.626 m) 220 lb (99.8 kg)     Oxygen Saturation Interpretation: Normal.    Constitutional:  Alert, development consistent with age. HEENT:  NC/NT. Airway patent. Neck:  Normal ROM. Supple. Respiratory:  Clear to auscultation and breath sounds equal.  CV:  Regular rate and rhythm, normal heart sounds, without pathological murmurs, ectopy, gallops, or rubs. GI:  Abdomen Soft, nontender, good bowel sounds. No firm or pulsatile mass. Lower Ext.:  Left: lower. Tenderness: Mild. Swelling: Moderate. Deformity: No.             ROM: full range with pain. Calf:  Left, Positive Frank's sign. .            Edema:  1+ Left lower extremity(s). Skin:  tenderness, swelling and no erythema. Distal Function:              Motor deficit: none. Sensory deficit: none. Pulse deficit: none. Capillary refill: normal.  Integument:  Normal turgor. Warm, dry, without visible rash, unless noted elsewhere. Neurological:  Oriented. Motor functions intact.     Lab / Imaging Results   (All laboratory and radiology results have been personally reviewed by myself)  Labs:  No results found for this visit on 05/21/20. Imaging: All Radiology results interpreted by Radiologist unless otherwise noted. US DUP LOWER EXTREMITY LEFT PACHECO   Final Result   No evidence of left lower extremity deep venous thrombosis. XR ANKLE LEFT (MIN 3 VIEWS)   Final Result        No acute fracture or joint dislocation. ED Course / Medical Decision Making     Medications   HYDROcodone-acetaminophen (NORCO) 5-325 MG per tablet 1 tablet (1 tablet Oral Given 5/21/20 1819)        Consult(s):   None    Procedure(s):   none    MDM:   Patient is a 66-year-old female who came to the emergency department for evaluation of lower left leg swelling. Denies any injury. X-ray of her left ankle was obtained which showed no acute fractures or dislocations. Underside of patient's left lower extremity was obtained which showed no evidence for any DVT. She discharged at this time and is to follow-up with her primary care provider. An Ace wrap was applied to patient's lower extremity. Was given a prescription for 800 mg ibuprofen to go home on with. Counseling: The emergency provider has spoken with the patient and discussed todays results, in addition to providing specific details for the plan of care and counseling regarding the diagnosis and prognosis. Questions are answered at this time and they are agreeable with the plan to be discharged. Assessment      1. Leg swelling      Plan   Discharge to home  Patient condition is good    New Medications     Discharge Medication List as of 5/21/2020  7:47 PM      START taking these medications    Details   ibuprofen (IBU) 800 MG tablet Take 1 tablet by mouth every 8 hours as needed for Pain Take with food. , Disp-30 tablet, R-0Print           Electronically signed by AKHIL Stanley NP   DD: 5/22/20  **This report was transcribed using voice recognition

## 2020-06-09 ENCOUNTER — OFFICE VISIT (OUTPATIENT)
Dept: FAMILY MEDICINE CLINIC | Age: 30
End: 2020-06-09
Payer: COMMERCIAL

## 2020-06-09 VITALS
DIASTOLIC BLOOD PRESSURE: 82 MMHG | HEIGHT: 63 IN | RESPIRATION RATE: 19 BRPM | BODY MASS INDEX: 36.71 KG/M2 | OXYGEN SATURATION: 98 % | TEMPERATURE: 97.9 F | HEART RATE: 102 BPM | SYSTOLIC BLOOD PRESSURE: 134 MMHG | WEIGHT: 207.2 LBS

## 2020-06-09 PROCEDURE — G8417 CALC BMI ABV UP PARAM F/U: HCPCS | Performed by: NURSE PRACTITIONER

## 2020-06-09 PROCEDURE — G8428 CUR MEDS NOT DOCUMENT: HCPCS | Performed by: NURSE PRACTITIONER

## 2020-06-09 PROCEDURE — G8431 POS CLIN DEPRES SCRN F/U DOC: HCPCS | Performed by: NURSE PRACTITIONER

## 2020-06-09 PROCEDURE — 99214 OFFICE O/P EST MOD 30 MIN: CPT | Performed by: NURSE PRACTITIONER

## 2020-06-09 PROCEDURE — 96160 PT-FOCUSED HLTH RISK ASSMT: CPT | Performed by: NURSE PRACTITIONER

## 2020-06-09 PROCEDURE — 4004F PT TOBACCO SCREEN RCVD TLK: CPT | Performed by: NURSE PRACTITIONER

## 2020-06-09 RX ORDER — HYDROCHLOROTHIAZIDE 12.5 MG/1
12.5 CAPSULE, GELATIN COATED ORAL EVERY MORNING
Qty: 30 CAPSULE | Refills: 5 | Status: SHIPPED | OUTPATIENT
Start: 2020-06-09 | End: 2020-08-17 | Stop reason: SDUPTHER

## 2020-06-09 RX ORDER — OMEPRAZOLE 20 MG/1
20 CAPSULE, DELAYED RELEASE ORAL EVERY 12 HOURS
Qty: 28 CAPSULE | Refills: 0 | Status: SHIPPED
Start: 2020-06-09 | End: 2020-07-14 | Stop reason: SDUPTHER

## 2020-06-09 ASSESSMENT — PATIENT HEALTH QUESTIONNAIRE - PHQ9
SUM OF ALL RESPONSES TO PHQ QUESTIONS 1-9: 14
SUM OF ALL RESPONSES TO PHQ QUESTIONS 1-9: 14
4. FEELING TIRED OR HAVING LITTLE ENERGY: 2
10. IF YOU CHECKED OFF ANY PROBLEMS, HOW DIFFICULT HAVE THESE PROBLEMS MADE IT FOR YOU TO DO YOUR WORK, TAKE CARE OF THINGS AT HOME, OR GET ALONG WITH OTHER PEOPLE: 2
SUM OF ALL RESPONSES TO PHQ9 QUESTIONS 1 & 2: 4
5. POOR APPETITE OR OVEREATING: 2
2. FEELING DOWN, DEPRESSED OR HOPELESS: 2
6. FEELING BAD ABOUT YOURSELF - OR THAT YOU ARE A FAILURE OR HAVE LET YOURSELF OR YOUR FAMILY DOWN: 1
7. TROUBLE CONCENTRATING ON THINGS, SUCH AS READING THE NEWSPAPER OR WATCHING TELEVISION: 1
8. MOVING OR SPEAKING SO SLOWLY THAT OTHER PEOPLE COULD HAVE NOTICED. OR THE OPPOSITE, BEING SO FIGETY OR RESTLESS THAT YOU HAVE BEEN MOVING AROUND A LOT MORE THAN USUAL: 2
3. TROUBLE FALLING OR STAYING ASLEEP: 2
1. LITTLE INTEREST OR PLEASURE IN DOING THINGS: 2
9. THOUGHTS THAT YOU WOULD BE BETTER OFF DEAD, OR OF HURTING YOURSELF: 0

## 2020-06-09 ASSESSMENT — ENCOUNTER SYMPTOMS
DIARRHEA: 0
CHEST TIGHTNESS: 0
NAUSEA: 0
CONSTIPATION: 0
WHEEZING: 0
SHORTNESS OF BREATH: 0
VOMITING: 0
COUGH: 0
SINUS PAIN: 0
EYE PAIN: 0
COLOR CHANGE: 0

## 2020-06-09 NOTE — PROGRESS NOTES
Tums daily). Negative for palpitations and leg swelling. Gastrointestinal: Negative for constipation, diarrhea, nausea and vomiting. Endocrine: Negative for cold intolerance, heat intolerance, polydipsia, polyphagia and polyuria. Genitourinary: Positive for menstrual problem (Irregular cycles, does not folllow for gynecology). Negative for difficulty urinating, vaginal bleeding, vaginal discharge and vaginal pain. Musculoskeletal: Positive for joint swelling (left knee pain). Negative for arthralgias and myalgias. Skin: Negative for color change and rash. Neurological: Negative for dizziness, light-headedness and headaches. Hematological: Negative for adenopathy. Does not bruise/bleed easily. Psychiatric/Behavioral: Positive for agitation. Negative for decreased concentration, sleep disturbance and suicidal ideas. The patient is nervous/anxious. BP Readings from Last 1 Encounters:   06/09/20 134/82    Recheck if >140/90  Hemoglobin A1C (%)   Date Value   08/11/2016 5.9     No results found for: LABMICR    Physical Exam:    Vitals:    06/09/20 0945   BP: 134/82   Pulse: 102   Resp: 19   Temp: 97.9 °F (36.6 °C)   TempSrc: Oral   SpO2: 98%   Weight: 207 lb 3.2 oz (94 kg)   Height: 5' 3\" (1.6 m)     Physical Exam  Vitals signs reviewed. Constitutional:       General: She is not in acute distress. Appearance: Normal appearance. She is well-developed and normal weight. She is not ill-appearing, toxic-appearing or diaphoretic. HENT:      Head: Normocephalic and atraumatic. Right Ear: Tympanic membrane, ear canal and external ear normal.      Left Ear: Tympanic membrane, ear canal and external ear normal.      Nose: Rhinorrhea present. Mouth/Throat:      Mouth: Mucous membranes are moist.      Pharynx: Oropharynx is clear. Eyes:      Extraocular Movements: Extraocular movements intact.       Conjunctiva/sclera: Conjunctivae normal.      Pupils: Pupils are equal, round, and reactive Return in about 2 weeks (around 6/23/2020). , or sooner if necessary.

## 2020-06-14 ENCOUNTER — APPOINTMENT (OUTPATIENT)
Dept: GENERAL RADIOLOGY | Age: 30
End: 2020-06-14
Payer: COMMERCIAL

## 2020-06-14 ENCOUNTER — APPOINTMENT (OUTPATIENT)
Dept: CT IMAGING | Age: 30
End: 2020-06-14
Payer: COMMERCIAL

## 2020-06-14 ENCOUNTER — HOSPITAL ENCOUNTER (EMERGENCY)
Age: 30
Discharge: HOME | End: 2020-06-14
Payer: COMMERCIAL

## 2020-06-14 VITALS
HEART RATE: 92 BPM | RESPIRATION RATE: 16 BRPM | SYSTOLIC BLOOD PRESSURE: 142 MMHG | DIASTOLIC BLOOD PRESSURE: 78 MMHG | OXYGEN SATURATION: 99 %

## 2020-06-14 PROCEDURE — 71046 X-RAY EXAM CHEST 2 VIEWS: CPT

## 2020-06-14 PROCEDURE — 6370000000 HC RX 637 (ALT 250 FOR IP): Performed by: PHYSICIAN ASSISTANT

## 2020-06-14 PROCEDURE — 99285 EMERGENCY DEPT VISIT HI MDM: CPT

## 2020-06-14 PROCEDURE — 70450 CT HEAD/BRAIN W/O DYE: CPT

## 2020-06-14 RX ORDER — NAPROXEN 500 MG/1
500 TABLET ORAL ONCE
Status: COMPLETED | OUTPATIENT
Start: 2020-06-14 | End: 2020-06-14

## 2020-06-14 RX ORDER — NAPROXEN 500 MG/1
500 TABLET ORAL 2 TIMES DAILY WITH MEALS
Qty: 20 TABLET | Refills: 0 | Status: SHIPPED | OUTPATIENT
Start: 2020-06-14 | End: 2020-11-17

## 2020-06-14 RX ADMIN — NAPROXEN 500 MG: 500 TABLET ORAL at 15:13

## 2020-06-14 ASSESSMENT — PAIN DESCRIPTION - LOCATION: LOCATION: HEAD

## 2020-06-14 ASSESSMENT — PAIN SCALES - GENERAL
PAINLEVEL_OUTOF10: 7
PAINLEVEL_OUTOF10: 8
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
## 2020-06-18 ENCOUNTER — TELEPHONE (OUTPATIENT)
Dept: ORTHOPEDIC SURGERY | Age: 30
End: 2020-06-18

## 2020-07-14 ENCOUNTER — OFFICE VISIT (OUTPATIENT)
Dept: FAMILY MEDICINE CLINIC | Age: 30
End: 2020-07-14
Payer: COMMERCIAL

## 2020-07-14 ENCOUNTER — HOSPITAL ENCOUNTER (OUTPATIENT)
Age: 30
Discharge: HOME | End: 2020-07-16
Payer: COMMERCIAL

## 2020-07-14 VITALS
DIASTOLIC BLOOD PRESSURE: 74 MMHG | SYSTOLIC BLOOD PRESSURE: 110 MMHG | WEIGHT: 209 LBS | OXYGEN SATURATION: 98 % | RESPIRATION RATE: 20 BRPM | TEMPERATURE: 98.2 F | BODY MASS INDEX: 37.02 KG/M2 | HEART RATE: 80 BPM

## 2020-07-14 LAB
ALBUMIN SERPL-MCNC: 4.7 G/DL (ref 3.5–5.2)
ALP BLD-CCNC: 56 U/L (ref 35–104)
ALT SERPL-CCNC: 18 U/L (ref 0–32)
ANION GAP SERPL CALCULATED.3IONS-SCNC: 16 MMOL/L (ref 7–16)
AST SERPL-CCNC: 22 U/L (ref 0–31)
BASOPHILS ABSOLUTE: 0.03 E9/L (ref 0–0.2)
BASOPHILS RELATIVE PERCENT: 0.5 % (ref 0–2)
BILIRUB SERPL-MCNC: 0.6 MG/DL (ref 0–1.2)
BUN BLDV-MCNC: 17 MG/DL (ref 6–20)
CALCIUM SERPL-MCNC: 9.6 MG/DL (ref 8.6–10.2)
CHLORIDE BLD-SCNC: 103 MMOL/L (ref 98–107)
CHOLESTEROL, TOTAL: 221 MG/DL (ref 0–199)
CO2: 19 MMOL/L (ref 22–29)
CREAT SERPL-MCNC: 0.8 MG/DL (ref 0.5–1)
EOSINOPHILS ABSOLUTE: 0.09 E9/L (ref 0.05–0.5)
EOSINOPHILS RELATIVE PERCENT: 1.4 % (ref 0–6)
GFR AFRICAN AMERICAN: >60
GFR NON-AFRICAN AMERICAN: >60 ML/MIN/1.73
GLUCOSE BLD-MCNC: 94 MG/DL (ref 74–99)
HBA1C MFR BLD: 5.7 % (ref 4–5.6)
HCT VFR BLD CALC: 42.6 % (ref 34–48)
HDLC SERPL-MCNC: 74 MG/DL
HEMOGLOBIN: 14.2 G/DL (ref 11.5–15.5)
IMMATURE GRANULOCYTES #: 0.02 E9/L
IMMATURE GRANULOCYTES %: 0.3 % (ref 0–5)
LDL CHOLESTEROL CALCULATED: 134 MG/DL (ref 0–99)
LYMPHOCYTES ABSOLUTE: 1.34 E9/L (ref 1.5–4)
LYMPHOCYTES RELATIVE PERCENT: 21.3 % (ref 20–42)
MCH RBC QN AUTO: 31.4 PG (ref 26–35)
MCHC RBC AUTO-ENTMCNC: 33.3 % (ref 32–34.5)
MCV RBC AUTO: 94.2 FL (ref 80–99.9)
MONOCYTES ABSOLUTE: 0.47 E9/L (ref 0.1–0.95)
MONOCYTES RELATIVE PERCENT: 7.5 % (ref 2–12)
NEUTROPHILS ABSOLUTE: 4.35 E9/L (ref 1.8–7.3)
NEUTROPHILS RELATIVE PERCENT: 69 % (ref 43–80)
PDW BLD-RTO: 13.4 FL (ref 11.5–15)
PLATELET # BLD: 274 E9/L (ref 130–450)
PMV BLD AUTO: 10.8 FL (ref 7–12)
POTASSIUM SERPL-SCNC: 4.1 MMOL/L (ref 3.5–5)
RBC # BLD: 4.52 E12/L (ref 3.5–5.5)
SODIUM BLD-SCNC: 138 MMOL/L (ref 132–146)
T4 FREE: 1.24 NG/DL (ref 0.93–1.7)
TOTAL PROTEIN: 8.2 G/DL (ref 6.4–8.3)
TRIGL SERPL-MCNC: 67 MG/DL (ref 0–149)
TSH SERPL DL<=0.05 MIU/L-ACNC: 0.9 UIU/ML (ref 0.27–4.2)
VITAMIN D 25-HYDROXY: 29 NG/ML (ref 30–100)
VLDLC SERPL CALC-MCNC: 13 MG/DL
WBC # BLD: 6.3 E9/L (ref 4.5–11.5)

## 2020-07-14 PROCEDURE — 85025 COMPLETE CBC W/AUTO DIFF WBC: CPT

## 2020-07-14 PROCEDURE — 82306 VITAMIN D 25 HYDROXY: CPT

## 2020-07-14 PROCEDURE — 99213 OFFICE O/P EST LOW 20 MIN: CPT | Performed by: NURSE PRACTITIONER

## 2020-07-14 PROCEDURE — 1036F TOBACCO NON-USER: CPT | Performed by: NURSE PRACTITIONER

## 2020-07-14 PROCEDURE — G8427 DOCREV CUR MEDS BY ELIG CLIN: HCPCS | Performed by: NURSE PRACTITIONER

## 2020-07-14 PROCEDURE — 84439 ASSAY OF FREE THYROXINE: CPT

## 2020-07-14 PROCEDURE — 83036 HEMOGLOBIN GLYCOSYLATED A1C: CPT

## 2020-07-14 PROCEDURE — 86703 HIV-1/HIV-2 1 RESULT ANTBDY: CPT

## 2020-07-14 PROCEDURE — G8417 CALC BMI ABV UP PARAM F/U: HCPCS | Performed by: NURSE PRACTITIONER

## 2020-07-14 PROCEDURE — 80053 COMPREHEN METABOLIC PANEL: CPT

## 2020-07-14 PROCEDURE — 80061 LIPID PANEL: CPT

## 2020-07-14 PROCEDURE — 84443 ASSAY THYROID STIM HORMONE: CPT

## 2020-07-14 RX ORDER — ARIPIPRAZOLE 10 MG/1
TABLET ORAL
COMMUNITY
Start: 2020-05-21 | End: 2020-07-14 | Stop reason: SDUPTHER

## 2020-07-14 RX ORDER — ARIPIPRAZOLE 10 MG/1
10 TABLET ORAL DAILY
Qty: 30 TABLET | Refills: 0 | Status: SHIPPED
Start: 2020-07-14 | End: 2020-08-06

## 2020-07-14 RX ORDER — OMEPRAZOLE 20 MG/1
20 CAPSULE, DELAYED RELEASE ORAL EVERY 12 HOURS
Qty: 28 CAPSULE | Refills: 0 | Status: SHIPPED
Start: 2020-07-14 | End: 2020-11-18

## 2020-07-14 ASSESSMENT — ENCOUNTER SYMPTOMS
DIARRHEA: 0
CONSTIPATION: 0
VOMITING: 0
WHEEZING: 0
COLOR CHANGE: 0
SHORTNESS OF BREATH: 0
SINUS PAIN: 0
EYE PAIN: 0
CHEST TIGHTNESS: 1
COUGH: 0
NAUSEA: 1

## 2020-07-14 NOTE — PROGRESS NOTES
HPI:  Patient comes in today with her father for   Chief Complaint   Patient presents with    Anxiety     follow up / getting worse    Memory Loss    Swelling     Lt hand swelling / stiffness this am    Nausea     x 1 week / decreased appetite   . Had not completed blood work since last appointment. Has not established with a counselor since the visit. Was at [address] prior, Dad is suggesting his counselor Coni, pt is agreeable to that. Denies use of marijuana or any illegal substances. Prior to Visit Medications    Medication Sig Taking? Authorizing Provider   omeprazole (PRILOSEC) 20 MG delayed release capsule Take 1 capsule by mouth every 12 hours for 14 days Yes AKHIL Garcia CNP   ARIPiprazole (ABILIFY) 10 MG tablet Take 1 tablet by mouth daily Yes AKHIL Garcia CNP   hydroCHLOROthiazide (MICROZIDE) 12.5 MG capsule Take 1 capsule by mouth every morning Yes AKHIL Garcia CNP   ibuprofen (IBU) 800 MG tablet Take 1 tablet by mouth every 8 hours as needed for Pain Take with food. Yes AKHIL Knight - NP   EPINEPHrine (EPIPEN 2-ANGELINA) 0.3 MG/0.3ML SOAJ injection Inject 0.3 mLs into the muscle once for 1 dose Use as directed for allergic reaction Yes Norma Gomez MD   buPROPion [facility] SR) 100 MG extended release tablet Take 100 mg by mouth daily Yes Historical Provider, MD   clonazePAM (KLONOPIN) 0.5 MG tablet Take 0.5 mg by mouth 2 times daily Yes Historical Provider, MD   hydrOXYzine (VISTARIL) 25 MG capsule Take 1 capsule by mouth 3 times daily as needed for Anxiety for up to 60 doses. Yes Lillie Raza PA-C   naproxen (NAPROSYN) 500 MG tablet Take 1 tablet by mouth 2 times daily (with meals)  Patient not taking: Reported on 7/14/2020  JOSE Dunne     Allergies   Allergen Reactions   Catherne Mary Flavor     Strawberry Flavor      Review of Systems    Review of Systems   Constitutional: Positive for activity change and appetite change (Does not eat). Negative for chills and fever. HENT: Negative for congestion, ear pain, sinus pain and sneezing. Eyes: Negative for pain and visual disturbance. Respiratory: Positive for chest tightness (during anxiety). Negative for cough, shortness of breath and wheezing. Cardiovascular: Negative for chest pain, palpitations and leg swelling. Gastrointestinal: Positive for nausea. Negative for constipation, diarrhea and vomiting. Endocrine: Negative for cold intolerance, heat intolerance and polyuria. Genitourinary: Negative for difficulty urinating and menstrual problem (currently on menstrual cycle). Musculoskeletal: Positive for gait problem (instability). Negative for arthralgias and myalgias. Skin: Negative for color change, rash and wound. Neurological: Negative for dizziness, light-headedness and headaches. Hematological: Negative for adenopathy. Does not bruise/bleed easily. Psychiatric/Behavioral: Positive for agitation. Negative for decreased concentration, sleep disturbance and suicidal ideas. The patient is nervous/anxious. VS:  /74   Pulse 80   Temp 98.2 °F (36.8 °C)   Resp 20   Wt 209 lb (94.8 kg)   LMP 07/14/2020   SpO2 98%   BMI 37.02 kg/m²     Physical Exam    Physical Exam  Vitals signs reviewed. Exam conducted with a chaperone present. Constitutional:       General: She is not in acute distress. Appearance: Normal appearance. She is well-developed and normal weight. She is not ill-appearing, toxic-appearing or diaphoretic. HENT:      Head: Normocephalic and atraumatic. Right Ear: Tympanic membrane, ear canal and external ear normal.      Left Ear: Tympanic membrane, ear canal and external ear normal.      Nose: Rhinorrhea present. Mouth/Throat:      Mouth: Mucous membranes are moist.      Pharynx: Oropharynx is clear. Eyes:      Extraocular Movements: Extraocular movements intact.       Conjunctiva/sclera: Conjunctivae normal.      Pupils: Pupils are equal, round, and reactive to light. Neck:      Musculoskeletal: Normal range of motion and neck supple. Thyroid: No thyromegaly. Vascular: No carotid bruit or JVD. Cardiovascular:      Rate and Rhythm: Normal rate and regular rhythm. Pulses: Normal pulses. Heart sounds: Normal heart sounds. No murmur. Pulmonary:      Effort: Pulmonary effort is normal. No respiratory distress. Breath sounds: Normal breath sounds. No wheezing. Chest:      Chest wall: No tenderness. Abdominal:      General: Bowel sounds are normal. There is no distension. Palpations: Abdomen is soft. Tenderness: There is no abdominal tenderness. Genitourinary:     Comments: Deferred. Musculoskeletal: Normal range of motion. General: No swelling or deformity. Right lower leg: No edema. Left lower leg: No edema. Lymphadenopathy:      Cervical: No cervical adenopathy. Skin:     General: Skin is warm and dry. Capillary Refill: Capillary refill takes less than 2 seconds. Findings: No bruising, erythema or rash. Neurological:      General: No focal deficit present. Mental Status: She is alert and oriented to person, place, and time. Mental status is at baseline. Cranial Nerves: No cranial nerve deficit. Sensory: No sensory deficit. Motor: No abnormal muscle tone. Coordination: Coordination normal.      Gait: Gait normal.      Deep Tendon Reflexes: Reflexes normal.   Psychiatric:         Mood and Affect: Mood normal.         Behavior: Behavior normal.         Thought Content: Thought content normal.         Judgment: Judgment normal.     Health Maintenance    BP Readings from Last 1 Encounters:   07/14/20 110/74    Recheck if >140/90  Hemoglobin A1C (%)   Date Value   07/14/2020 5.7 (H)     Plan:    William Joseph was seen today for anxiety, memory loss, swelling and nausea.     Diagnoses and all orders for this visit:    Gastroesophageal reflux disease, esophagitis presence not specified  -     omeprazole (PRILOSEC) 20 MG delayed release capsule; Take 1 capsule by mouth every 12 hours for 14 days  - The patient is asked to make an attempt to improve diet and exercise patterns to aid in medical management of this problem. Positive depression screening  -     ARIPiprazole (ABILIFY) 10 MG tablet;  Take 1 tablet by mouth daily  - Pt encouraged to follow with therapy, father has recommended & will call his counseling center    Marijuana user  - Denies use, discussed may test for illegal substances in the future    - Pt will return in 2 weeks as we previously discussed to review medications    Greater than 15  Minutes was spent with patient and more than 50% of the time was spent face to facecounseling and educating regarding diagnoses

## 2020-07-15 LAB — HIV-1 AND HIV-2 ANTIBODIES: NORMAL

## 2020-08-06 RX ORDER — ARIPIPRAZOLE 10 MG/1
TABLET ORAL
Qty: 30 TABLET | Refills: 0 | Status: SHIPPED
Start: 2020-08-06 | End: 2020-11-18

## 2020-08-06 NOTE — TELEPHONE ENCOUNTER
Last Appointment:  7/28/2020  Future Appointments   Date Time Provider Prasanna Tabares   8/17/2020  2:00 PM AKHIL Pressley -  [address]

## 2020-08-17 ENCOUNTER — OFFICE VISIT (OUTPATIENT)
Dept: FAMILY MEDICINE CLINIC | Age: 30
End: 2020-08-17
Payer: COMMERCIAL

## 2020-08-17 VITALS
OXYGEN SATURATION: 97 % | WEIGHT: 221 LBS | DIASTOLIC BLOOD PRESSURE: 78 MMHG | BODY MASS INDEX: 39.16 KG/M2 | HEART RATE: 95 BPM | TEMPERATURE: 98.8 F | RESPIRATION RATE: 16 BRPM | SYSTOLIC BLOOD PRESSURE: 110 MMHG | HEIGHT: 63 IN

## 2020-08-17 PROBLEM — Z01.21 ENCOUNTER FOR DENTAL EXAMINATION AND CLEANING WITH ABNORMAL FINDINGS: Status: ACTIVE | Noted: 2020-01-23

## 2020-08-17 PROBLEM — Z01.20 ENCOUNTER FOR DENTAL EXAMINATION: Status: ACTIVE | Noted: 2020-08-17

## 2020-08-17 PROBLEM — Z01.89 ENCOUNTER FOR OTHER SPECIFIED SPECIAL EXAMINATIONS: Status: ACTIVE | Noted: 2020-01-23

## 2020-08-17 PROBLEM — Z01.20 ENCOUNTER FOR DENTAL EXAMINATION AND CLEANING WITHOUT ABNORMAL FINDINGS: Status: ACTIVE | Noted: 2020-01-31

## 2020-08-17 PROBLEM — Z01.89: Status: ACTIVE | Noted: 2020-08-17

## 2020-08-17 PROCEDURE — G8417 CALC BMI ABV UP PARAM F/U: HCPCS | Performed by: NURSE PRACTITIONER

## 2020-08-17 PROCEDURE — 99213 OFFICE O/P EST LOW 20 MIN: CPT | Performed by: NURSE PRACTITIONER

## 2020-08-17 PROCEDURE — G8428 CUR MEDS NOT DOCUMENT: HCPCS | Performed by: NURSE PRACTITIONER

## 2020-08-17 PROCEDURE — 1036F TOBACCO NON-USER: CPT | Performed by: NURSE PRACTITIONER

## 2020-08-17 RX ORDER — HYDROCHLOROTHIAZIDE 12.5 MG/1
12.5 CAPSULE, GELATIN COATED ORAL EVERY MORNING
Qty: 30 CAPSULE | Refills: 5 | Status: SHIPPED
Start: 2020-08-17 | End: 2020-11-17 | Stop reason: SDUPTHER

## 2020-08-17 RX ORDER — HYDROXYZINE HYDROCHLORIDE 25 MG/1
TABLET, FILM COATED ORAL
COMMUNITY
Start: 2020-08-10 | End: 2020-11-17

## 2020-08-17 RX ORDER — IPRATROPIUM BROMIDE AND ALBUTEROL SULFATE 2.5; .5 MG/3ML; MG/3ML
1 SOLUTION RESPIRATORY (INHALATION) EVERY 6 HOURS PRN
Qty: 360 ML | Refills: 0 | Status: SHIPPED
Start: 2020-08-17 | End: 2020-11-17 | Stop reason: ALTCHOICE

## 2020-08-17 RX ORDER — CARIPRAZINE 3 MG/1
CAPSULE, GELATIN COATED ORAL
Status: ON HOLD | COMMUNITY
Start: 2020-08-10 | End: 2022-03-30

## 2020-08-17 RX ORDER — FAMOTIDINE 20 MG/1
TABLET, FILM COATED ORAL
Qty: 60 TABLET | Status: CANCELLED | OUTPATIENT
Start: 2020-08-17

## 2020-08-17 RX ORDER — ALBUTEROL SULFATE 90 UG/1
2 AEROSOL, METERED RESPIRATORY (INHALATION) EVERY 6 HOURS PRN
Qty: 1 INHALER | Refills: 3 | Status: SHIPPED
Start: 2020-08-17 | End: 2021-02-08 | Stop reason: SDUPTHER

## 2020-08-17 RX ORDER — ALBUTEROL SULFATE 90 UG/1
2 AEROSOL, METERED RESPIRATORY (INHALATION) EVERY 6 HOURS PRN
COMMUNITY
End: 2020-08-17 | Stop reason: SDUPTHER

## 2020-08-17 RX ORDER — LORATADINE 10 MG/1
TABLET ORAL
Status: ON HOLD | COMMUNITY
Start: 2020-05-27 | End: 2022-04-05 | Stop reason: HOSPADM

## 2020-08-17 RX ORDER — LORATADINE 10 MG/1
TABLET ORAL
Status: CANCELLED | OUTPATIENT
Start: 2020-08-17

## 2020-08-17 RX ORDER — ALBUTEROL SULFATE 2.5 MG/3ML
SOLUTION RESPIRATORY (INHALATION)
Qty: 120 EACH | Status: CANCELLED | OUTPATIENT
Start: 2020-08-17

## 2020-08-17 RX ORDER — FAMOTIDINE 20 MG/1
20 TABLET, FILM COATED ORAL 2 TIMES DAILY
Qty: 60 TABLET | Refills: 3 | Status: SHIPPED
Start: 2020-08-17 | End: 2020-11-17 | Stop reason: SDUPTHER

## 2020-08-17 RX ORDER — BUPROPION HYDROCHLORIDE 100 MG/1
100 TABLET, EXTENDED RELEASE ORAL
Qty: 30 TABLET | Refills: 2 | Status: SHIPPED
Start: 2020-08-17 | End: 2021-02-08

## 2020-08-17 RX ORDER — DIPHENHYDRAMINE HYDROCHLORIDE 25 MG/1
CAPSULE ORAL
COMMUNITY
Start: 2020-05-21 | End: 2021-06-29

## 2020-08-17 RX ORDER — FAMOTIDINE 20 MG/1
TABLET, FILM COATED ORAL
COMMUNITY
Start: 2020-08-14 | End: 2020-08-17 | Stop reason: ALTCHOICE

## 2020-08-17 RX ORDER — ACETAMINOPHEN 500 MG
500 TABLET ORAL EVERY 6 HOURS PRN
Status: ON HOLD | COMMUNITY
End: 2022-04-05 | Stop reason: HOSPADM

## 2020-08-17 RX ORDER — OMEPRAZOLE 20 MG/1
20 CAPSULE, DELAYED RELEASE ORAL EVERY 12 HOURS
Qty: 28 CAPSULE | Refills: 0 | Status: CANCELLED | OUTPATIENT
Start: 2020-08-17 | End: 2020-08-31

## 2020-08-17 RX ORDER — RANITIDINE 150 MG/1
TABLET ORAL
COMMUNITY
End: 2020-08-17 | Stop reason: ALTCHOICE

## 2020-08-17 RX ORDER — ALBUTEROL SULFATE 2.5 MG/3ML
SOLUTION RESPIRATORY (INHALATION)
COMMUNITY
End: 2020-11-17 | Stop reason: ALTCHOICE

## 2020-08-17 ASSESSMENT — PATIENT HEALTH QUESTIONNAIRE - PHQ9
SUM OF ALL RESPONSES TO PHQ QUESTIONS 1-9: 0
SUM OF ALL RESPONSES TO PHQ QUESTIONS 1-9: 0
1. LITTLE INTEREST OR PLEASURE IN DOING THINGS: 0
2. FEELING DOWN, DEPRESSED OR HOPELESS: 0
SUM OF ALL RESPONSES TO PHQ9 QUESTIONS 1 & 2: 0

## 2020-08-17 ASSESSMENT — ENCOUNTER SYMPTOMS
VOMITING: 0
COUGH: 0
CHEST TIGHTNESS: 0
NAUSEA: 0
WHEEZING: 0
SINUS PAIN: 0
DIARRHEA: 0
COLOR CHANGE: 0
CONSTIPATION: 0
EYE PAIN: 0

## 2020-08-17 NOTE — PROGRESS NOTES
HPI:  Patient comes intoday for   Chief Complaint   Patient presents with   Edmar Smith   . She has started at [address] and is seeing them again August 7th, she was prescribed the Vraylar & hydroxyzine. She reports that her neck hurts while sleeping,  She has been taking her rescue inhaler daily, uses it when she is playing with her daughter outside. Prior to Visit Medications    Medication Sig Taking? Authorizing Provider   albuterol (PROVENTIL) (2.5 MG/3ML) 0.083% nebulizer solution albuterol sulfate 2.5 mg/3 mL (0.083 %) solution for nebulization Yes Historical Provider, MD   VRAYLAR 3 MG CAPS capsule TAKE 1 CAPSULE BY MOUTH EVERY DAY Yes Historical Provider, MD   hydrOXYzine (ATARAX) 25 MG tablet TAKE 1 TABLET BY MOUTH TWICE A DAY AS NEEDED ANXIETY Yes Historical Provider, MD   ALLERGY RELIEF 10 MG tablet TAKE ONE TABLET BY MOUTH EVERY DAY Yes Historical Provider, MD   acetaminophen (TYLENOL) 500 MG tablet Pain Relief Extra Strength 500 mg tablet Yes Historical Provider, MD   albuterol sulfate  (90 Base) MCG/ACT inhaler Inhale 2 puffs into the lungs every 6 hours as needed for Wheezing Yes AKHIL Galaviz CNP   buPROPion (WELLBUTRIN SR) 100 MG extended release tablet Take 1 tablet by mouth daily (with breakfast) Yes AKHIL Galaviz CNP   hydroCHLOROthiazide (MICROZIDE) 12.5 MG capsule Take 1 capsule by mouth every morning Yes AKHIL Galaviz CNP   famotidine (PEPCID AC MAXIMUM STRENGTH) 20 MG tablet Take 1 tablet by mouth 2 times daily Yes AKHIL Galavzi CNP   ipratropium-albuterol (DUONEB) 0.5-2.5 (3) MG/3ML SOLN nebulizer solution Inhale 3 mLs into the lungs every 6 hours as needed for Shortness of Breath Yes AKHIL Galaviz CNP   ibuprofen (IBU) 800 MG tablet Take 1 tablet by mouth every 8 hours as needed for Pain Take with food. Yes AKHIL Knight NP   BANOPHEN 25 MG capsule TAKE ONE CAPSULE BY MOUTH AS NEEDED FOR ALLERGIES.  MAY CAUSE DROWSINESS  Historical Provider, MD   ARIPiprazole (ABILIFY) 10 MG tablet TAKE 1 TABLET BY MOUTH EVERY DAY  Patient not taking: Reported on 8/17/2020  AKHIL Garcia CNP   omeprazole (PRILOSEC) 20 MG delayed release capsule Take 1 capsule by mouth every 12 hours for 14 days  AKHIL Garcia CNP   naproxen (NAPROSYN) 500 MG tablet Take 1 tablet by mouth 2 times daily (with meals)  Patient not taking: Reported on 8/17/2020  JOSE Dunne   EPINEPHrine (EPIPEN 2-ANGELINA) 0.3 MG/0.3ML SOAJ injection Inject 0.3 mLs into the muscle once for 1 dose Use as directed for allergic reaction  Norma Gomez MD   clonazePAM (KLONOPIN) 0.5 MG tablet Take 0.5 mg by mouth 2 times daily  Historical Provider, MD   hydrOXYzine (VISTARIL) 25 MG capsule Take 1 capsule by mouth 3 times daily as needed for Anxiety for up to 60 doses. Patient not taking: Reported on 8/17/2020  Lillie Raza PA-C         Allergies   Allergen Reactions   Catherne Mary Flavor     Strawberry Flavor          Review of Systems    Review of Systems   Constitutional: Negative for activity change, appetite change, chills and fever. HENT: Negative for congestion, ear pain, sinus pain and sneezing. Eyes: Negative for pain and visual disturbance. Respiratory: Positive for shortness of breath. Negative for cough, chest tightness and wheezing. Cardiovascular: Negative for chest pain, palpitations and leg swelling. Gastrointestinal: Negative for constipation, diarrhea, nausea and vomiting. Endocrine: Negative for cold intolerance, heat intolerance and polyuria. Genitourinary: Negative for difficulty urinating. Musculoskeletal: Negative for arthralgias and myalgias. Skin: Negative for color change and rash. Neurological: Negative for dizziness, light-headedness and headaches. Hematological: Negative for adenopathy. Does not bruise/bleed easily.    Psychiatric/Behavioral: Negative for agitation, decreased concentration, sleep disturbance and suicidal ideas. The patient is not nervous/anxious. VS:  /78   Pulse 95   Temp 98.8 °F (37.1 °C)   Resp 16   Ht 5' 3\" (1.6 m)   Wt 221 lb (100.2 kg)   LMP 08/01/2020 (Approximate)   SpO2 97%   BMI 39.15 kg/m²     Physical Exam    Physical Exam  Vitals signs and nursing note reviewed. Constitutional:       General: She is not in acute distress. Appearance: Normal appearance. She is well-developed and normal weight. She is not ill-appearing, toxic-appearing or diaphoretic. HENT:      Head: Normocephalic and atraumatic. Right Ear: Tympanic membrane, ear canal and external ear normal. There is no impacted cerumen. Left Ear: Tympanic membrane, ear canal and external ear normal. There is no impacted cerumen. Ears:      Comments: ENT: canals clear, TMs intact and pearly gray, nasal turbinates erythematous and boggy, pharynx shows erythema and post nasal drip       Nose: Rhinorrhea present. No congestion. Mouth/Throat:      Mouth: Mucous membranes are moist.      Pharynx: Oropharynx is clear. No oropharyngeal exudate or posterior oropharyngeal erythema. Eyes:      Extraocular Movements: Extraocular movements intact. Conjunctiva/sclera: Conjunctivae normal.      Pupils: Pupils are equal, round, and reactive to light. Neck:      Musculoskeletal: Normal range of motion and neck supple. Thyroid: No thyromegaly. Cardiovascular:      Rate and Rhythm: Normal rate and regular rhythm. Pulses: Normal pulses. Heart sounds: Normal heart sounds. No murmur. Pulmonary:      Effort: Pulmonary effort is normal. No respiratory distress. Breath sounds: Normal breath sounds. No wheezing. Abdominal:      General: Bowel sounds are normal. There is no distension. Palpations: Abdomen is soft. Tenderness: There is no abdominal tenderness. There is no guarding or rebound.    Genitourinary:     Vagina: Normal.      Comments: Deferred. Musculoskeletal: Normal range of motion. Lymphadenopathy:      Cervical: No cervical adenopathy. Skin:     General: Skin is warm and dry. Capillary Refill: Capillary refill takes less than 2 seconds. Findings: No bruising, erythema or rash. Neurological:      General: No focal deficit present. Mental Status: She is alert and oriented to person, place, and time. Mental status is at baseline. Cranial Nerves: No cranial nerve deficit. Sensory: No sensory deficit. Motor: No weakness. Coordination: Coordination normal.      Gait: Gait normal.   Psychiatric:         Mood and Affect: Mood normal.         Behavior: Behavior normal.         Thought Content: Thought content normal.         Judgment: Judgment normal.     Health Maintenance    BP Readings from Last 1 Encounters:   08/17/20 110/78    Recheck if >140/90  Hemoglobin A1C (%)   Date Value   07/14/2020 5.7 (H)       Plan:    Baldomero Roach was seen today for discuss labs. Diagnoses and all orders for this visit:    Depression, unspecified depression type  -     buPROPion (WELLBUTRIN SR) 100 MG extended release tablet; Take 1 tablet by mouth daily (with breakfast)  - The current medical regimen is effective;  continue present plan and medications. - Follow with therapy    Hypertension, unspecified type (stable)  -     hydroCHLOROthiazide (MICROZIDE) 12.5 MG capsule; Take 1 capsule by mouth every morning  - The current medical regimen is effective;  continue present plan and medications. Gastroesophageal reflux disease, esophagitis presence not specified  -     famotidine (PEPCID AC MAXIMUM STRENGTH) 20 MG tablet; Take 1 tablet by mouth 2 times daily  - The patient is asked to make an attempt to improve diet and exercise patterns to aid in medical management of this problem. Mild asthma without complication, unspecified whether persistent  -     albuterol sulfate  (90 Base) MCG/ACT inhaler;  Inhale 2 puffs

## 2020-08-18 ASSESSMENT — ENCOUNTER SYMPTOMS: SHORTNESS OF BREATH: 1

## 2020-08-26 ENCOUNTER — TELEPHONE (OUTPATIENT)
Dept: ADMINISTRATIVE | Age: 30
End: 2020-08-26

## 2020-08-26 NOTE — TELEPHONE ENCOUNTER
Pt has dental appt today with the [facility], cannot make it and would like to be called back to r/s. Thank you.

## 2020-09-14 ENCOUNTER — HOSPITAL ENCOUNTER (EMERGENCY)
Age: 30
Discharge: HOME | End: 2020-09-14
Payer: COMMERCIAL

## 2020-09-14 ENCOUNTER — APPOINTMENT (OUTPATIENT)
Dept: GENERAL RADIOLOGY | Age: 30
End: 2020-09-14
Payer: COMMERCIAL

## 2020-09-14 VITALS
RESPIRATION RATE: 17 BRPM | TEMPERATURE: 98 F | WEIGHT: 220 LBS | OXYGEN SATURATION: 98 % | DIASTOLIC BLOOD PRESSURE: 77 MMHG | SYSTOLIC BLOOD PRESSURE: 160 MMHG | HEIGHT: 63 IN | HEART RATE: 80 BPM | BODY MASS INDEX: 38.98 KG/M2

## 2020-09-14 LAB
ALBUMIN SERPL-MCNC: 4.2 G/DL (ref 3.5–5.2)
ALP BLD-CCNC: 45 U/L (ref 35–104)
ALT SERPL-CCNC: 22 U/L (ref 0–32)
ANION GAP SERPL CALCULATED.3IONS-SCNC: 13 MMOL/L (ref 7–16)
AST SERPL-CCNC: 26 U/L (ref 0–31)
BASOPHILS ABSOLUTE: 0.05 E9/L (ref 0–0.2)
BASOPHILS RELATIVE PERCENT: 0.9 % (ref 0–2)
BILIRUB SERPL-MCNC: 0.4 MG/DL (ref 0–1.2)
BUN BLDV-MCNC: 17 MG/DL (ref 6–20)
CALCIUM SERPL-MCNC: 9.6 MG/DL (ref 8.6–10.2)
CHLORIDE BLD-SCNC: 103 MMOL/L (ref 98–107)
CO2: 22 MMOL/L (ref 22–29)
CREAT SERPL-MCNC: 0.8 MG/DL (ref 0.5–1)
D DIMER: <200 NG/ML DDU
EOSINOPHILS ABSOLUTE: 0.11 E9/L (ref 0.05–0.5)
EOSINOPHILS RELATIVE PERCENT: 2 % (ref 0–6)
GFR AFRICAN AMERICAN: >60
GFR NON-AFRICAN AMERICAN: >60 ML/MIN/1.73
GLUCOSE BLD-MCNC: 85 MG/DL (ref 74–99)
HCT VFR BLD CALC: 40.6 % (ref 34–48)
HEMOGLOBIN: 13.6 G/DL (ref 11.5–15.5)
IMMATURE GRANULOCYTES #: 0.02 E9/L
IMMATURE GRANULOCYTES %: 0.4 % (ref 0–5)
LYMPHOCYTES ABSOLUTE: 1.51 E9/L (ref 1.5–4)
LYMPHOCYTES RELATIVE PERCENT: 27.6 % (ref 20–42)
MCH RBC QN AUTO: 30.9 PG (ref 26–35)
MCHC RBC AUTO-ENTMCNC: 33.5 % (ref 32–34.5)
MCV RBC AUTO: 92.3 FL (ref 80–99.9)
MONOCYTES ABSOLUTE: 0.52 E9/L (ref 0.1–0.95)
MONOCYTES RELATIVE PERCENT: 9.5 % (ref 2–12)
NEUTROPHILS ABSOLUTE: 3.26 E9/L (ref 1.8–7.3)
NEUTROPHILS RELATIVE PERCENT: 59.6 % (ref 43–80)
PDW BLD-RTO: 13.3 FL (ref 11.5–15)
PLATELET # BLD: 265 E9/L (ref 130–450)
PMV BLD AUTO: 11 FL (ref 7–12)
POTASSIUM SERPL-SCNC: 4.8 MMOL/L (ref 3.5–5)
RBC # BLD: 4.4 E12/L (ref 3.5–5.5)
SODIUM BLD-SCNC: 138 MMOL/L (ref 132–146)
TOTAL PROTEIN: 7.4 G/DL (ref 6.4–8.3)
TROPONIN: <0.01 NG/ML (ref 0–0.03)
WBC # BLD: 5.5 E9/L (ref 4.5–11.5)

## 2020-09-14 PROCEDURE — 6370000000 HC RX 637 (ALT 250 FOR IP): Performed by: PHYSICIAN ASSISTANT

## 2020-09-14 PROCEDURE — 99283 EMERGENCY DEPT VISIT LOW MDM: CPT

## 2020-09-14 PROCEDURE — 93005 ELECTROCARDIOGRAM TRACING: CPT | Performed by: PHYSICIAN ASSISTANT

## 2020-09-14 PROCEDURE — 94664 DEMO&/EVAL PT USE INHALER: CPT

## 2020-09-14 PROCEDURE — 80053 COMPREHEN METABOLIC PANEL: CPT

## 2020-09-14 PROCEDURE — 2580000003 HC RX 258: Performed by: PHYSICIAN ASSISTANT

## 2020-09-14 PROCEDURE — 6360000002 HC RX W HCPCS: Performed by: PHYSICIAN ASSISTANT

## 2020-09-14 PROCEDURE — 99284 EMERGENCY DEPT VISIT MOD MDM: CPT

## 2020-09-14 PROCEDURE — 96361 HYDRATE IV INFUSION ADD-ON: CPT

## 2020-09-14 PROCEDURE — 85025 COMPLETE CBC W/AUTO DIFF WBC: CPT

## 2020-09-14 PROCEDURE — 84484 ASSAY OF TROPONIN QUANT: CPT

## 2020-09-14 PROCEDURE — 94640 AIRWAY INHALATION TREATMENT: CPT

## 2020-09-14 PROCEDURE — 96374 THER/PROPH/DIAG INJ IV PUSH: CPT

## 2020-09-14 PROCEDURE — 71045 X-RAY EXAM CHEST 1 VIEW: CPT

## 2020-09-14 PROCEDURE — 85378 FIBRIN DEGRADE SEMIQUANT: CPT

## 2020-09-14 RX ORDER — SUCRALFATE 1 G/1
1 TABLET ORAL 4 TIMES DAILY
Qty: 120 TABLET | Refills: 3 | Status: SHIPPED | OUTPATIENT
Start: 2020-09-14 | End: 2021-02-08

## 2020-09-14 RX ORDER — IPRATROPIUM BROMIDE AND ALBUTEROL SULFATE 2.5; .5 MG/3ML; MG/3ML
1 SOLUTION RESPIRATORY (INHALATION)
Status: COMPLETED | OUTPATIENT
Start: 2020-09-14 | End: 2020-09-14

## 2020-09-14 RX ORDER — 0.9 % SODIUM CHLORIDE 0.9 %
1000 INTRAVENOUS SOLUTION INTRAVENOUS ONCE
Status: COMPLETED | OUTPATIENT
Start: 2020-09-14 | End: 2020-09-14

## 2020-09-14 RX ORDER — NEBULIZER ACCESSORIES
1 KIT MISCELLANEOUS DAILY PRN
Qty: 1 KIT | Refills: 0 | Status: SHIPPED | OUTPATIENT
Start: 2020-09-14 | End: 2020-11-17

## 2020-09-14 RX ORDER — PREDNISONE 10 MG/1
40 TABLET ORAL DAILY
Qty: 20 TABLET | Refills: 0 | Status: SHIPPED | OUTPATIENT
Start: 2020-09-14 | End: 2020-09-19

## 2020-09-14 RX ORDER — METHYLPREDNISOLONE SODIUM SUCCINATE 125 MG/2ML
60 INJECTION, POWDER, LYOPHILIZED, FOR SOLUTION INTRAMUSCULAR; INTRAVENOUS ONCE
Status: COMPLETED | OUTPATIENT
Start: 2020-09-14 | End: 2020-09-14

## 2020-09-14 RX ORDER — ASPIRIN 325 MG
325 TABLET ORAL ONCE
Status: COMPLETED | OUTPATIENT
Start: 2020-09-14 | End: 2020-09-14

## 2020-09-14 RX ADMIN — LIDOCAINE HYDROCHLORIDE: 20 SOLUTION ORAL; TOPICAL at 12:39

## 2020-09-14 RX ADMIN — IPRATROPIUM BROMIDE AND ALBUTEROL SULFATE 1 AMPULE: .5; 3 SOLUTION RESPIRATORY (INHALATION) at 12:45

## 2020-09-14 RX ADMIN — IPRATROPIUM BROMIDE AND ALBUTEROL SULFATE 1 AMPULE: .5; 3 SOLUTION RESPIRATORY (INHALATION) at 12:47

## 2020-09-14 RX ADMIN — IPRATROPIUM BROMIDE AND ALBUTEROL SULFATE 1 AMPULE: .5; 3 SOLUTION RESPIRATORY (INHALATION) at 12:46

## 2020-09-14 RX ADMIN — METHYLPREDNISOLONE SODIUM SUCCINATE 60 MG: 125 INJECTION, POWDER, FOR SOLUTION INTRAMUSCULAR; INTRAVENOUS at 12:39

## 2020-09-14 RX ADMIN — SODIUM CHLORIDE 1000 ML: 9 INJECTION, SOLUTION INTRAVENOUS at 12:08

## 2020-09-14 RX ADMIN — ASPIRIN 325 MG: 325 TABLET ORAL at 12:39

## 2020-09-14 ASSESSMENT — PAIN DESCRIPTION - DESCRIPTORS: DESCRIPTORS: ACHING

## 2020-09-14 ASSESSMENT — PAIN DESCRIPTION - PAIN TYPE: TYPE: ACUTE PAIN

## 2020-09-14 ASSESSMENT — PAIN SCALES - GENERAL: PAINLEVEL_OUTOF10: 8

## 2020-09-14 ASSESSMENT — PAIN DESCRIPTION - LOCATION: LOCATION: CHEST

## 2020-09-14 NOTE — ED PROVIDER NOTES
negative. History reviewed. No pertinent surgical history. Social History:  reports that she has quit smoking. Her smoking use included cigarettes. She smoked 0.25 packs per day. She has never used smokeless tobacco. She reports that she does not drink alcohol or use drugs. Family History: family history includes Diabetes in her father and mother; Other in her brother. Allergies: Cherry flavor and Strawberry flavor    Physical Exam         ED Triage Vitals   BP Temp Temp Source Pulse Resp SpO2 Height Weight   09/14/20 1124 09/14/20 1114 09/14/20 1114 09/14/20 1114 09/14/20 1124 09/14/20 1114 09/14/20 1124 09/14/20 1124   (!) 152/80 97.5 °F (36.4 °C) Tympanic 86 16 96 % 5' 3\" (1.6 m) 220 lb (99.8 kg)      Oxygen Saturation Interpretation: Normal.    General Appearance/Constitutional:  Alert, development consistent with age, NAD. HEENT:  NC/NT. Airway patent. Neck:  Normal ROM. Supple. Respiratory:  Mild expiratory wheezing. CV:  Regular rate and rhythm, normal heart sounds, without pathological murmurs, ectopy, gallops, or rubs. Chest:  Symmetrical without visible rash or tenderness. GI:  Abdomen Soft, nontender, good bowel sounds. No firm or pulsatile mass. Extremities: No tenderness or edema noted. Lymphatics: no lymphadenopathy noted  Integument:  Normal turgor. Warm, dry, without visible rash, unless noted elsewhere. Neurological:  Oriented. Motor functions intact.    Psychiatric:  Affect normal.    Lab / Imaging Results   (All laboratory and radiology results have been personally reviewed by myself)  Labs:  Results for orders placed or performed during the hospital encounter of 09/14/20   CBC Auto Differential   Result Value Ref Range    WBC 5.5 4.5 - 11.5 E9/L    RBC 4.40 3.50 - 5.50 E12/L    Hemoglobin 13.6 11.5 - 15.5 g/dL    Hematocrit 40.6 34.0 - 48.0 %    MCV 92.3 80.0 - 99.9 fL    MCH 30.9 26.0 - 35.0 pg    MCHC 33.5 32.0 - 34.5 %    RDW 13.3 11.5 - 15.0 fL    Platelets 744 717 - 450 E9/L    MPV 11.0 7.0 - 12.0 fL    Neutrophils % 59.6 43.0 - 80.0 %    Immature Granulocytes % 0.4 0.0 - 5.0 %    Lymphocytes % 27.6 20.0 - 42.0 %    Monocytes % 9.5 2.0 - 12.0 %    Eosinophils % 2.0 0.0 - 6.0 %    Basophils % 0.9 0.0 - 2.0 %    Neutrophils Absolute 3.26 1.80 - 7.30 E9/L    Immature Granulocytes # 0.02 E9/L    Lymphocytes Absolute 1.51 1.50 - 4.00 E9/L    Monocytes Absolute 0.52 0.10 - 0.95 E9/L    Eosinophils Absolute 0.11 0.05 - 0.50 E9/L    Basophils Absolute 0.05 0.00 - 0.20 E9/L   Comprehensive Metabolic Panel   Result Value Ref Range    Sodium 138 132 - 146 mmol/L    Potassium 4.8 3.5 - 5.0 mmol/L    Chloride 103 98 - 107 mmol/L    CO2 22 22 - 29 mmol/L    Anion Gap 13 7 - 16 mmol/L    Glucose 85 74 - 99 mg/dL    BUN 17 6 - 20 mg/dL    CREATININE 0.8 0.5 - 1.0 mg/dL    GFR Non-African American >60 >=60 mL/min/1.73    GFR African American >60     Calcium 9.6 8.6 - 10.2 mg/dL    Total Protein 7.4 6.4 - 8.3 g/dL    Alb 4.2 3.5 - 5.2 g/dL    Total Bilirubin 0.4 0.0 - 1.2 mg/dL    Alkaline Phosphatase 45 35 - 104 U/L    ALT 22 0 - 32 U/L    AST 26 0 - 31 U/L   Troponin   Result Value Ref Range    Troponin <0.01 0.00 - 0.03 ng/mL   D-Dimer, Quantitative   Result Value Ref Range    D-Dimer, Quant <200 ng/mL DDU     Imaging: All Radiology results interpreted by Radiologist unless otherwise noted. XR CHEST PORTABLE   Final Result   Subtle density in the right lower lung is likely expiratory imaging   artifact and overlying breast tissue. There is no evidence of   consolidation or effusion, no evidence of cardiac decompensation or   chest wall trauma. EKG #1:  Interpreted by emergency department physician unless otherwise noted. Time:  1121    Rate: 87   Rhythm: Sinus. Interpretation: normal EKG, normal sinus rhythm.     ED Course / Medical Decision Making     Medications   0.9 % sodium chloride bolus (1,000 mLs Intravenous New Bag 9/14/20 1208)   aspirin tablet 325 mg (325 mg Oral Given 9/14/20 1239)   aluminum & magnesium hydroxide-simethicone (MAALOX) 30 mL, lidocaine viscous hcl (XYLOCAINE) 5 mL (GI COCKTAIL) ( Oral Given 9/14/20 1239)   methylPREDNISolone sodium (SOLU-MEDROL) injection 60 mg (60 mg Intravenous Given 9/14/20 1239)   ipratropium-albuterol (DUONEB) nebulizer solution 1 ampule (1 ampule Inhalation Given 9/14/20 1247)      Re-Evaluations:  9/14/20      Time: 1400    Patient was sleeping comfortably when I entered the room. States her symptoms have improved. Consultations:             None    Procedures:   none    MDM: Patient presents to the emergency department for chest pain and shortness of breath. Based the patient's history and physical examination her symptoms are most likely combination of a COPD exacerbation as well as GERD. Patient has been encouraged to take the medications she already has for acid reflux and to limit eating spicy foods. She should follow-up with her primary care provider to request a gastroenterology and/or general surgery consult for endoscopy with or without colonoscopy. Patient states that she lost her nebulizer and will be given a prescription for 1 as well as steroids to help with her COPD exacerbation. Continue taking Pepcid and will also add Carafate. Vital signs stable at discharge. She is well-appearing and appropriate for discharge and outpatient follow-up. Patient has been highly encouraged to follow-up with her primary care provider. Specific conditions for emergent return have been discussed and the patient verbalized understanding to return immediately for new or worsening symptoms. Counseling:   I have spoken with the patient and discussed todays results, in addition to providing specific details for the plan of care and counseling regarding the diagnosis and prognosis and are agreeable with the plan. Assessment      1. COPD exacerbation ([facility] [address]    2.  Gastroesophageal reflux disease, esophagitis presence not specified This patient's ED course included: a personal history and physicial examination, re-evaluation prior to disposition, IV medications, cardiac monitoring and continuous pulse oximetry  This patient has remained hemodynamically stable, improved and been closely monitored during their ED course. Plan   Discharge to home. Patient condition is good. New Medications     New Prescriptions    PREDNISONE (DELTASONE) 10 MG TABLET    Take 4 tablets by mouth daily for 5 days    RESPIRATORY THERAPY SUPPLIES (NEBULIZER/TUBING/MOUTHPIECE) KIT    1 kit by Does not apply route daily as needed (shortness of breath)    SUCRALFATE (CARAFATE) 1 GM TABLET    Take 1 tablet by mouth 4 times daily     Electronically signed by Lula Mckeon PA-C   DD: 9/14/20  **This report was transcribed using voice recognition software. Every effort was made to ensure accuracy; however, inadvertent computerized transcription errors may be present.   END OF PROVIDER NOTE        Lula Mckeon PA-C  09/14/20 0690

## 2020-09-15 ENCOUNTER — CARE COORDINATION (OUTPATIENT)
Dept: CARE COORDINATION | Age: 30
End: 2020-09-15

## 2020-09-15 LAB
EKG ATRIAL RATE: 87 BPM
EKG P AXIS: 10 DEGREES
EKG P-R INTERVAL: 138 MS
EKG Q-T INTERVAL: 354 MS
EKG QRS DURATION: 80 MS
EKG QTC CALCULATION (BAZETT): 425 MS
EKG R AXIS: 58 DEGREES
EKG T AXIS: 40 DEGREES
EKG VENTRICULAR RATE: 87 BPM

## 2020-09-15 PROCEDURE — 93010 ELECTROCARDIOGRAM REPORT: CPT | Performed by: INTERNAL MEDICINE

## 2020-09-15 NOTE — CARE COORDINATION
Left First message for patient to return call re: ED Follow-up for At Risk COVID-19   Plan to offer Loop Enrollment  Patient has contact information  Episode to be resolved

## 2020-09-16 PROBLEM — Z01.89 ENCOUNTER FOR OTHER SPECIFIED SPECIAL EXAMINATIONS: Status: RESOLVED | Noted: 2020-01-23 | Resolved: 2020-09-16

## 2020-09-16 PROBLEM — Z01.20 ENCOUNTER FOR DENTAL EXAMINATION: Status: RESOLVED | Noted: 2020-08-17 | Resolved: 2020-09-16

## 2020-09-16 PROBLEM — Z01.20 ENCOUNTER FOR DENTAL EXAMINATION AND CLEANING WITHOUT ABNORMAL FINDINGS: Status: RESOLVED | Noted: 2020-01-31 | Resolved: 2020-09-16

## 2020-11-17 ENCOUNTER — OFFICE VISIT (OUTPATIENT)
Dept: FAMILY MEDICINE CLINIC | Age: 30
End: 2020-11-17
Payer: COMMERCIAL

## 2020-11-17 VITALS
HEART RATE: 95 BPM | WEIGHT: 225 LBS | OXYGEN SATURATION: 96 % | RESPIRATION RATE: 18 BRPM | BODY MASS INDEX: 38.41 KG/M2 | HEIGHT: 64 IN | DIASTOLIC BLOOD PRESSURE: 78 MMHG | SYSTOLIC BLOOD PRESSURE: 110 MMHG | TEMPERATURE: 97.7 F

## 2020-11-17 PROCEDURE — G8484 FLU IMMUNIZE NO ADMIN: HCPCS | Performed by: NURSE PRACTITIONER

## 2020-11-17 PROCEDURE — 99395 PREV VISIT EST AGE 18-39: CPT | Performed by: NURSE PRACTITIONER

## 2020-11-17 RX ORDER — METRONIDAZOLE 500 MG/1
500 TABLET ORAL 2 TIMES DAILY
Qty: 14 TABLET | Refills: 0 | Status: SHIPPED | OUTPATIENT
Start: 2020-11-17 | End: 2020-11-24

## 2020-11-17 RX ORDER — HYDROCHLOROTHIAZIDE 12.5 MG/1
12.5 CAPSULE, GELATIN COATED ORAL EVERY MORNING
Qty: 30 CAPSULE | Refills: 5 | Status: SHIPPED
Start: 2020-11-17 | End: 2021-02-08 | Stop reason: SDUPTHER

## 2020-11-17 RX ORDER — FAMOTIDINE 20 MG/1
20 TABLET, FILM COATED ORAL 2 TIMES DAILY
Qty: 60 TABLET | Refills: 5 | Status: SHIPPED
Start: 2020-11-17 | End: 2021-02-08 | Stop reason: SDUPTHER

## 2020-11-17 RX ORDER — ALBUTEROL SULFATE 2.5 MG/3ML
SOLUTION RESPIRATORY (INHALATION)
Qty: 120 EACH | Refills: 5 | Status: CANCELLED | OUTPATIENT
Start: 2020-11-17

## 2020-11-17 NOTE — PROGRESS NOTES
Disp: , Rfl:     omeprazole (PRILOSEC) 20 MG delayed release capsule, Take 1 capsule by mouth every 12 hours for 14 days, Disp: 28 capsule, Rfl: 0    EPINEPHrine (EPIPEN 2-ANGELINA) 0.3 MG/0.3ML SOAJ injection, Inject 0.3 mLs into the muscle once for 1 dose Use as directed for allergic reaction, Disp: 2 each, Rfl: 0  Allergies   Allergen Reactions    Cherry     Cherry Flavor     Strawberry Extract     Strawberry Flavor           Physical Exam:  Vitals:    11/17/20 1330   BP: 110/78   Pulse: 95   Resp: 18   Temp: 97.7 °F (36.5 °C)   TempSrc: Oral   SpO2: 96%   Weight: 225 lb (102.1 kg)   Height: 5' 3.5\" (1.613 m)     General:  Patient alert and oriented x 3, NAD, pleasant  HEENT:  Atraumatic, normocephalic, PERRLA, EOMI, clear conjunctiva, TMs clear, nose-clear, throat - no erythema  Neck:  Supple, no goiter, no carotid bruits, no LAD  Lungs:  CTA B  Heart:  RRR, no murmurs, gallops or rubs  Abdomen:  Soft, NTND, + bowel sounds  Extremities:  No clubbing, cyanosis or edema  Neuro:  CN II-XII grossly intact, 5/5 strength in bilateral upper and lower extremities, 2 + reflexes. Breast:  No skin dimpling or erythema, no discrete masses or lumps, no nipple discharge, no axillary lymphadenopathy  Female :    External exam:  Labia with no visible lesions/masses,  vagina with grayish discharge and odor. No lesions noted. Internal exam:  Cervix- pink, no lesions, no cervical motion tenderness, uterus - normal in size, ovaries - not palpable  Pap completed with chaperone present. Assessment/Plan:  Joana You was seen today for copd and other. Diagnoses and all orders for this visit:    Flu vaccine need  - Pt declines flu vaccination at today's appointment    Hypertension, unspecified type  -     hydroCHLOROthiazide (MICROZIDE) 12.5 MG capsule; Take 1 capsule by mouth every morning    Gastroesophageal reflux disease (stable)  -     famotidine (PEPCID AC MAXIMUM STRENGTH) 20 MG tablet;  Take 1 tablet by mouth 2 times daily  - The patient is asked to make an attempt to improve diet and exercise patterns to aid in medical management of this problem. Mild asthma without complication, unspecified whether persistent (stable)  -     Discontinue: mometasone-formoterol (DULERA) 200-5 MCG/ACT inhaler; Inhale 2 puffs into the lungs every 12 hours  -     mometasone-formoterol (DULERA) 200-5 MCG/ACT inhaler; Inhale 2 puffs into the lungs every 12 hours  - Will monitor breathing with inhalers & not nebulizer at this time; patients symptoms have remained stable    Routine Papanicolaou smear  -     PAP SMEAR    BV (bacterial vaginosis)  -     metroNIDAZOLE (FLAGYL) 500 MG tablet; Take 1 tablet by mouth 2 times daily for 7 days      Return in about 3 months (around 2/17/2021). , or sooner if necessary. Educational materials and/or home exercises printed for patient's review and were included in patient instructions on his/her After Visit Summary and given to patient at the end of visit. Counseled regarding above diagnosis, including possible risks and complications,  especially if left uncontrolled. Counseled regarding the possible side effects, risks, benefits and alternatives to treatment; patient and/or guardian verbalizes understanding, agrees, feels comfortable with and wishes to proceed with above treatment plan. Advised patient to call with any new medication issues, and read all Rx info from pharmacy to assure aware of all possible risks and side effects of medication before taking. Reviewed age and gender appropriate health screening exams and vaccinations. Advised patient regarding importance of keeping up with recommended health maintenance and to schedule as soon as possible if overdue, as this is important in assessing for undiagnosed pathology, especially cancer, as well as protecting against potentially harmful/life threatening disease.         Patient and/or guardian verbalizes understanding and agrees with above counseling, assessment and plan. All questions answered.

## 2020-11-19 NOTE — PATIENT INSTRUCTIONS
Patient Education        Bacterial Vaginosis: Care Instructions  Overview     Bacterial vaginosis is a type of vaginal infection. It is caused by excess growth of certain bacteria that are normally found in the vagina. Symptoms can include itching, swelling, pain when you urinate or have sex, and a gray or yellow discharge with a \"fishy\" odor. It is not considered an infection that is spread through sexual contact. Symptoms can be annoying and uncomfortable. But bacterial vaginosis does not usually cause other health problems. However, if you have it while you are pregnant, it can cause complications. While the infection may go away on its own, most doctors use antibiotics to treat it. You may have been prescribed pills or vaginal cream. With treatment, bacterial vaginosis usually clears up in 5 to 7 days. Follow-up care is a key part of your treatment and safety. Be sure to make and go to all appointments, and call your doctor if you are having problems. It's also a good idea to know your test results and keep a list of the medicines you take. How can you care for yourself at home? · Take your antibiotics as directed. Do not stop taking them just because you feel better. You need to take the full course of antibiotics. · Do not eat or drink anything that contains alcohol if you are taking metronidazole or tinidazole. · Keep using your medicine if you start your period. Use pads instead of tampons while using a vaginal cream or suppository. Tampons can absorb the medicine. · Wear loose cotton clothing. Do not wear nylon and other materials that hold body heat and moisture close to the skin. · Do not scratch. Relieve itching with a cold pack or a cool bath. · Do not wash your vaginal area more than once a day. Use plain water or a mild, unscented soap. Do not douche. When should you call for help?   Watch closely for changes in your health, and be sure to contact your doctor if:    · You have unexpected vaginal bleeding.     · You have a fever.     · You have new or increased pain in your vagina or pelvis.     · You are not getting better after 1 week.     · Your symptoms return after you finish the course of your medicine. Where can you learn more? Go to https://[email or web address].[employer]Coreworks. org and sign in to your [employer] account. Enter T726 in the CLIPPATE box to learn more about \"Bacterial Vaginosis: Care Instructions. \"     If you do not have an account, please click on the \"Sign Up Now\" link. Current as of: November 8, 2019               Content Version: 12.6  © 6715-3120 [employer]. Care instructions adapted under license by [facility] [place] ([facility]). If you have questions about a medical condition or this instruction, always ask your healthcare professional. [address] any warranty or liability for your use of this information.

## 2021-02-08 ENCOUNTER — OFFICE VISIT (OUTPATIENT)
Dept: FAMILY MEDICINE CLINIC | Age: 31
End: 2021-02-08
Payer: COMMERCIAL

## 2021-02-08 VITALS
BODY MASS INDEX: 38.71 KG/M2 | SYSTOLIC BLOOD PRESSURE: 122 MMHG | HEART RATE: 97 BPM | DIASTOLIC BLOOD PRESSURE: 76 MMHG | RESPIRATION RATE: 19 BRPM | WEIGHT: 222 LBS | OXYGEN SATURATION: 98 %

## 2021-02-08 DIAGNOSIS — Z13.31 POSITIVE DEPRESSION SCREENING: ICD-10-CM

## 2021-02-08 DIAGNOSIS — J45.909 MILD ASTHMA WITHOUT COMPLICATION, UNSPECIFIED WHETHER PERSISTENT: ICD-10-CM

## 2021-02-08 DIAGNOSIS — K21.9 GASTROESOPHAGEAL REFLUX DISEASE WITHOUT ESOPHAGITIS: ICD-10-CM

## 2021-02-08 DIAGNOSIS — R73.09 ELEVATED GLUCOSE: Primary | ICD-10-CM

## 2021-02-08 DIAGNOSIS — I10 HYPERTENSION, UNSPECIFIED TYPE: ICD-10-CM

## 2021-02-08 LAB — HBA1C MFR BLD: 5.1 %

## 2021-02-08 PROCEDURE — 83036 HEMOGLOBIN GLYCOSYLATED A1C: CPT | Performed by: NURSE PRACTITIONER

## 2021-02-08 PROCEDURE — G8417 CALC BMI ABV UP PARAM F/U: HCPCS | Performed by: NURSE PRACTITIONER

## 2021-02-08 PROCEDURE — 1036F TOBACCO NON-USER: CPT | Performed by: NURSE PRACTITIONER

## 2021-02-08 PROCEDURE — G8431 POS CLIN DEPRES SCRN F/U DOC: HCPCS | Performed by: NURSE PRACTITIONER

## 2021-02-08 PROCEDURE — G8484 FLU IMMUNIZE NO ADMIN: HCPCS | Performed by: NURSE PRACTITIONER

## 2021-02-08 PROCEDURE — 99214 OFFICE O/P EST MOD 30 MIN: CPT | Performed by: NURSE PRACTITIONER

## 2021-02-08 PROCEDURE — G8427 DOCREV CUR MEDS BY ELIG CLIN: HCPCS | Performed by: NURSE PRACTITIONER

## 2021-02-08 RX ORDER — FLUOXETINE HYDROCHLORIDE 20 MG/1
CAPSULE ORAL
COMMUNITY
End: 2021-02-08

## 2021-02-08 RX ORDER — CLONIDINE HYDROCHLORIDE 0.1 MG/1
TABLET ORAL
COMMUNITY
Start: 2020-11-11 | End: 2021-02-08

## 2021-02-08 RX ORDER — ALBUTEROL SULFATE 90 UG/1
2 AEROSOL, METERED RESPIRATORY (INHALATION) EVERY 6 HOURS PRN
Qty: 1 INHALER | Refills: 3 | Status: SHIPPED
Start: 2021-02-08 | End: 2021-03-29 | Stop reason: SDUPTHER

## 2021-02-08 RX ORDER — FAMOTIDINE 20 MG/1
20 TABLET, FILM COATED ORAL 2 TIMES DAILY
Qty: 60 TABLET | Refills: 5 | Status: SHIPPED
Start: 2021-02-08 | End: 2021-05-06 | Stop reason: SDUPTHER

## 2021-02-08 RX ORDER — HYDROCHLOROTHIAZIDE 12.5 MG/1
12.5 CAPSULE, GELATIN COATED ORAL EVERY MORNING
Qty: 30 CAPSULE | Refills: 5 | Status: SHIPPED
Start: 2021-02-08 | End: 2021-03-29 | Stop reason: SDUPTHER

## 2021-02-08 ASSESSMENT — PATIENT HEALTH QUESTIONNAIRE - PHQ9
9. THOUGHTS THAT YOU WOULD BE BETTER OFF DEAD, OR OF HURTING YOURSELF: 0
10. IF YOU CHECKED OFF ANY PROBLEMS, HOW DIFFICULT HAVE THESE PROBLEMS MADE IT FOR YOU TO DO YOUR WORK, TAKE CARE OF THINGS AT HOME, OR GET ALONG WITH OTHER PEOPLE: 3
8. MOVING OR SPEAKING SO SLOWLY THAT OTHER PEOPLE COULD HAVE NOTICED. OR THE OPPOSITE, BEING SO FIGETY OR RESTLESS THAT YOU HAVE BEEN MOVING AROUND A LOT MORE THAN USUAL: 3
3. TROUBLE FALLING OR STAYING ASLEEP: 3
1. LITTLE INTEREST OR PLEASURE IN DOING THINGS: 3
SUM OF ALL RESPONSES TO PHQ9 QUESTIONS 1 & 2: 6
SUM OF ALL RESPONSES TO PHQ QUESTIONS 1-9: 24
2. FEELING DOWN, DEPRESSED OR HOPELESS: 3
4. FEELING TIRED OR HAVING LITTLE ENERGY: 3

## 2021-02-08 NOTE — PROGRESS NOTES
Prior to Visit Medications    Medication Sig Taking? Authorizing Provider   hydroCHLOROthiazide (MICROZIDE) 12.5 MG capsule Take 1 capsule by mouth every morning Yes AKHIL Bingham CNP   famotidine (PEPCID AC MAXIMUM STRENGTH) 20 MG tablet Take 1 tablet by mouth 2 times daily Yes AKHIL Bingham CNP   mometasone-formoterol (DULERA) 200-5 MCG/ACT inhaler Inhale 2 puffs into the lungs every 12 hours Yes AKHIL Bingham CNP   albuterol sulfate  (90 Base) MCG/ACT inhaler Inhale 2 puffs into the lungs every 6 hours as needed for Wheezing Yes AKHIL Bingham CNP   VRAYLAR 3 MG CAPS capsule TAKE 1 CAPSULE BY MOUTH EVERY DAY Yes Historical Provider, MD   BANOPHEN 25 MG capsule TAKE ONE CAPSULE BY MOUTH AS NEEDED FOR ALLERGIES. MAY CAUSE DROWSINESS Yes Historical Provider, MD   ALLERGY RELIEF 10 MG tablet TAKE ONE TABLET BY MOUTH EVERY DAY Yes Historical Provider, MD   acetaminophen (TYLENOL) 500 MG tablet Pain Relief Extra Strength 500 mg tablet Yes Historical Provider, MD   EPINEPHrine (EPIPEN 2-ANGELINA) 0.3 MG/0.3ML SOAJ injection Inject 0.3 mLs into the muscle once for 1 dose Use as directed for allergic reaction  Stuart Santiago MD      Allergies   Allergen Reactions    Josph Canny Flavor     Strawberry Extract     Strawberry Flavor      Past Medical History:   Diagnosis Date    Herpes genitalia     Paranoid schizophrenia ([facility] [address]     Seizures ([facility] [address]     Ulcer      No past surgical history on file.     Social History     Socioeconomic History    Marital status: Single     Spouse name: Not on file    Number of children: Not on file    Years of education: Not on file    Highest education level: Not on file   Occupational History    Not on file   Social Needs    Financial resource strain: Not on file    Food insecurity     Worry: Not on file     Inability: Not on file    Transportation needs     Medical: Not on file     Non-medical: Not on file   Tobacco Use  Smoking status: Former Smoker     Packs/day: 0.25     Types: Cigarettes    Smokeless tobacco: Never Used    Tobacco comment: puff once in awhile per patient   Substance and Sexual Activity    Alcohol use: No    Drug use: No    Sexual activity: Yes     Partners: Male   Lifestyle    Physical activity     Days per week: Not on file     Minutes per session: Not on file    Stress: Not on file   Relationships    Social connections     Talks on phone: Not on file     Gets together: Not on file     Attends Taoism service: Not on file     Active member of club or organization: Not on file     Attends meetings of clubs or organizations: Not on file     Relationship status: Not on file    Intimate partner violence     Fear of current or ex partner: Not on file     Emotionally abused: Not on file     Physically abused: Not on file     Forced sexual activity: Not on file   Other Topics Concern    Not on file   Social History Narrative    ** Merged History Encounter **           Family History   Problem Relation Age of Onset    Diabetes Mother     Diabetes Father     Other Brother      ADVANCE DIRECTIVE: N, <no information>    Vitals:    02/08/21 0910   BP: 122/76   Pulse: 97   Resp: 19   SpO2: 98%   Weight: 222 lb (100.7 kg)     Estimated body mass index is 38.71 kg/m² as calculated from the following:    Height as of 11/17/20: 5' 3.5\" (1.613 m). Weight as of this encounter: 222 lb (100.7 kg). Physical Exam  Vitals signs and nursing note reviewed. Constitutional:       General: She is not in acute distress. Appearance: Normal appearance. She is well-developed and normal weight. She is not ill-appearing, toxic-appearing or diaphoretic. HENT:      Head: Normocephalic and atraumatic. Right Ear: Tympanic membrane, ear canal and external ear normal. There is no impacted cerumen. Left Ear: Tympanic membrane, ear canal and external ear normal. There is no impacted cerumen.       Ears: Comments: ENT: canals clear, TMs intact and pearly gray, nasal turbinates erythematous and boggy, pharynx shows erythema and post nasal drip       Nose: No congestion or rhinorrhea. Mouth/Throat:      Mouth: Mucous membranes are moist.      Pharynx: Oropharynx is clear. No oropharyngeal exudate or posterior oropharyngeal erythema. Comments: No discoloration or thyromegaly    Eyes:      Extraocular Movements: Extraocular movements intact. Conjunctiva/sclera: Conjunctivae normal.      Pupils: Pupils are equal, round, and reactive to light. Neck:      Musculoskeletal: Normal range of motion and neck supple. Thyroid: No thyromegaly. Cardiovascular:      Rate and Rhythm: Normal rate and regular rhythm. Pulses: Normal pulses. Heart sounds: Normal heart sounds. No murmur. Pulmonary:      Effort: Pulmonary effort is normal. No respiratory distress. Breath sounds: Normal breath sounds. No wheezing. Abdominal:      General: Bowel sounds are normal. There is no distension. Palpations: Abdomen is soft. Tenderness: There is no abdominal tenderness. There is no guarding or rebound. Genitourinary:     Vagina: Normal.      Comments: Deferred. Musculoskeletal: Normal range of motion. Lymphadenopathy:      Cervical: No cervical adenopathy. Skin:     General: Skin is warm and dry. Capillary Refill: Capillary refill takes less than 2 seconds. Findings: No bruising, erythema or rash. Neurological:      General: No focal deficit present. Mental Status: She is alert and oriented to person, place, and time. Mental status is at baseline. Cranial Nerves: No cranial nerve deficit. Sensory: No sensory deficit. Motor: No weakness. Coordination: Coordination normal.      Gait: Gait normal.   Psychiatric:         Mood and Affect: Mood normal.         Behavior: Behavior normal.         Thought Content:  Thought content normal. Judgment: Judgment normal.       No flowsheet data found. Lab Results   Component Value Date    CHOL 221 07/14/2020    CHOL 190 08/11/2016    CHOL 186 07/27/2015    TRIG 67 07/14/2020    TRIG 103 08/11/2016    TRIG 83 07/27/2015    HDL 74 07/14/2020    HDL 41 08/11/2016    HDL 51 07/27/2015    LDLCALC 134 07/14/2020    LDLCALC 128 08/11/2016    LDLCALC 118 07/27/2015    GLUCOSE 85 09/14/2020    GLUCOSE 92 07/30/2011    LABA1C 5.1 02/08/2021    LABA1C 5.7 07/14/2020    LABA1C 5.9 08/11/2016       The ASCVD Risk score (Gamal Grant, et al., 2013) failed to calculate for the following reasons: The 2013 ASCVD risk score is only valid for ages 36 to 78    Immunization History   Administered Date(s) Administered    Tdap (Boostrix, Adacel) 09/05/2017       Health Maintenance   Topic Date Due    Hepatitis C screen  1990    Pneumococcal 0-64 years Vaccine (1 of 1 - PPSV23) 11/07/1996    Flu vaccine (1) 11/17/2021 (Originally 9/1/2020)    Potassium monitoring  09/14/2021    Creatinine monitoring  09/14/2021    Cervical cancer screen  11/17/2023    DTaP/Tdap/Td vaccine (2 - Td) 09/05/2027    HIV screen  Completed    Hepatitis A vaccine  Aged Out    Hepatitis B vaccine  Aged Out    Hib vaccine  Aged Out    Meningococcal (ACWY) vaccine  Aged Out    Varicella vaccine  Discontinued       ASSESSMENT/PLAN:    HCC:   No acute findings today meriting change in management plan. - Health Maintenance reviewed today & counseled patient as appropriate. 1. Elevated glucose (stable)  -     POCT glycosylated hemoglobin (Hb A1C) reduced from 5.7 to 5.1    2. Hypertension, unspecified type (stable)  -     hydroCHLOROthiazide (MICROZIDE) 12.5 MG capsule; Take 1 capsule by mouth every morning, Disp-30 capsule, R-5Normal    3.  Gastroesophageal reflux disease without esophagitis (exacerbated) -     famotidine (PEPCID AC MAXIMUM STRENGTH) 20 MG tablet; Take 1 tablet by mouth 2 times daily, Disp-60 tablet, R-5 Normal  - Has not been taking her Pepcid in some time, she does not admit to regular diet  - The patient is asked to make an attempt to improve diet and exercise patterns to aid in medical management of this problem. 4. Mild asthma without complication, unspecified whether persistent (stable)  -     mometasone-formoterol (DULERA) 200-5 MCG/ACT inhaler; Inhale 2 puffs into the lungs every 12 hours, Disp-1 Inhaler, R-5Normal  -     albuterol sulfate  (90 Base) MCG/ACT inhaler; Inhale 2 puffs into the lungs every 6 hours as needed for Wheezing, Disp-1 Inhaler, R-3 Normal    5. Positive depression screening  -     Positive Screen for Clinical Depression with a Documented Follow-up Plan   - Calvin Goddard lost her mother suddenly last week, they have had several memorials & she is now figuring out how she is going to raise her daughter (whom her mother was going to take over guardianship of)  - Her brother would like her to establish at [facility] where he attends for therapy  - Calvin Goddard has taken herself off medications in the past for depression   - On the basis of positive PHQ-9 screening (PHQ-9 Total Score: 24), the following plan was implemented: referral to Behavioral health provided and referral for grief counseling provided. Patient will follow-up in 1 week(s) with PCP until patient is able to establish. Return in about 1 week (around 2/15/2021). An electronic signature was used to authenticate this note.     --AKHIL Wick - CNP on 2/9/2021 at 8:23 AM

## 2021-02-09 ENCOUNTER — HOSPITAL ENCOUNTER (EMERGENCY)
Age: 31
Discharge: HOME | End: 2021-02-09
Payer: COMMERCIAL

## 2021-02-09 VITALS
HEIGHT: 63 IN | SYSTOLIC BLOOD PRESSURE: 148 MMHG | DIASTOLIC BLOOD PRESSURE: 112 MMHG | OXYGEN SATURATION: 98 % | WEIGHT: 215 LBS | TEMPERATURE: 98.2 F | HEART RATE: 103 BPM | RESPIRATION RATE: 14 BRPM | BODY MASS INDEX: 38.09 KG/M2

## 2021-02-09 DIAGNOSIS — R22.1 NECK SWELLING: Primary | ICD-10-CM

## 2021-02-09 PROBLEM — S14.109A CONTUSION OF CERVICAL CORD (HCC): Status: RESOLVED | Noted: 2018-07-01 | Resolved: 2021-02-09

## 2021-02-09 PROCEDURE — 99282 EMERGENCY DEPT VISIT SF MDM: CPT

## 2021-02-09 ASSESSMENT — ENCOUNTER SYMPTOMS
NAUSEA: 0
EYE PAIN: 0
CONSTIPATION: 0
VOMITING: 0
DIARRHEA: 0
COUGH: 0
SORE THROAT: 0
SHORTNESS OF BREATH: 0
WHEEZING: 0
TROUBLE SWALLOWING: 0
SINUS PAIN: 0
COLOR CHANGE: 0
CHEST TIGHTNESS: 0

## 2021-02-09 NOTE — ED PROVIDER NOTES
Independent    HPI:  2/9/21, Time: 2:22 AM STACEY Bennett is a 27 y.o. female presenting to the ED for neck swelling. Patient presents to the emergency department stating that her anterior aspect of her neck is swollen. She denies any injury or trauma. She denies any throat tightening drooling, sore throat or fevers associated with this. She reports that she did see her primary care physician and was restarted back on her medicine that she has been out of for the last month. There is also been no change in voice quality. Patient also with clear chest pain, shortness of breath, abdominal pain and no noted nausea, vomiting or diarrhea. Patient was questioned further and reports that she has been dealing with this for over the past year. Review of Systems:   A complete review of systems was performed and pertinent positives and negatives are stated within HPI, all other systems reviewed and are negative.          --------------------------------------------- PAST HISTORY ---------------------------------------------  Past Medical History:  has a past medical history of Herpes genitalia, Paranoid schizophrenia ([facility] [address] Seizures ([facility] and Ulcer. Past Surgical History:  has no past surgical history on file. Social History:  reports that she has quit smoking. Her smoking use included cigarettes. She smoked 0.25 packs per day. She has never used smokeless tobacco. She reports that she does not drink alcohol or use drugs. Family History: family history includes Diabetes in her father and mother; Other in her brother. The patients home medications have been reviewed.     Allergies: Ag Parsley flavor, Strawberry extract, and Strawberry flavor    -------------------------------------------------- RESULTS -------------------------------------------------  All laboratory and radiology results have been personally reviewed by myself   LABS:  No results found for this visit on 02/09/21. RADIOLOGY:  Interpreted by Radiologist.  US HEAD NECK SOFT TISSUE THYROID    (Results Pending)       ------------------------- NURSING NOTES AND VITALS REVIEWED ---------------------------   The nursing notes within the ED encounter and vital signs as below have been reviewed. BP (!) 148/112   Pulse 103   Temp 98.2 °F (36.8 °C)   Resp 14   Ht 5' 3\" (1.6 m)   Wt 215 lb (97.5 kg)   SpO2 98%   BMI 38.09 kg/m²   Oxygen Saturation Interpretation: Normal      ---------------------------------------------------PHYSICAL EXAM--------------------------------------      Constitutional/General: Alert and oriented x3, well appearing, non toxic in NAD  Head: Normocephalic and atraumatic  Eyes: PERRL, EOMI  Mouth: Oropharynx clear, handling secretions, no trismus, airway intact, no tonsillar swelling no erythema noted. Speech clear and coherent negative for trismus. Neck: Supple, full ROM,, no gross swelling noted no lymph node enlargement noted. No change noted in tissue integrity no unusual rash or erythema noted  Pulmonary: Lungs clear to auscultation bilaterally, no wheezes, rales, or rhonchi. Not in respiratory distress  Cardiovascular:  Regular rate and rhythm, no murmurs, gallops, or rubs. 2+ distal pulses  Abdomen: Soft, non tender, non distended,   Extremities: Moves all extremities x 4. Warm and well perfused  Skin: warm and dry without rash  Neurologic: GCS 15,  Psych: Normal Affect      ------------------------------ ED COURSE/MEDICAL DECISION MAKING----------------------  Medications - No data to display      ED COURSE:       Medical Decision Making: Patient has been having neck swelling which she reports has been ongoing for over a year. She in fact saw her primary care physician yesterday and was restarted back on her hydrochlorothiazide and there is a note made that the neck swelling is actually improved.   Patient was made aware of the plan for discharge home she does not at all have any suspicion for any peritonsillar abscess, airway compromise, infectious etiology, allergic component. She reports that she feels swollen to the anterior neck I do not see any unusual rash or hive and there is no gross thyroid swelling noted either or any lymph node enlargement. Plan will be to discharge patient with prescription for ultrasound. She was made aware to come back in the morning to get this completed in for primary care to follow-up. Patient reports understanding of this. Patient with lungs that are clear, no airway compromise noted, no wheezing no stridor. Patient will be safely discharged home. Pulse ox 98% on room air       Counseling: The emergency provider has spoken with the patient and discussed todays results, in addition to providing specific details for the plan of care and counseling regarding the diagnosis and prognosis. Questions are answered at this time and they are agreeable with the plan.      --------------------------------- IMPRESSION AND DISPOSITION ---------------------------------    IMPRESSION  1. Neck swelling        DISPOSITION  Disposition: Discharge to home  Patient condition is good      NOTE: This report was transcribed using voice recognition software.  Every effort was made to ensure accuracy; however, inadvertent computerized transcription errors may be present     AKHIL Zaragoza - CNP  02/09/21 [id number]

## 2021-02-11 ENCOUNTER — CARE COORDINATION (OUTPATIENT)
Dept: CARE COORDINATION | Age: 31
End: 2021-02-11

## 2021-02-11 NOTE — CARE COORDINATION
Care Coordination Services explained to patient. Patient verbalizes understanding, but declines at this time. Patient encouraged to call PCP office with any questions/concerns. Reports that she lost her mother last week and is planning her  and will begin grief counseling soon and declines care coordination at this time.

## 2021-02-15 ENCOUNTER — OFFICE VISIT (OUTPATIENT)
Dept: FAMILY MEDICINE CLINIC | Age: 31
End: 2021-02-15
Payer: COMMERCIAL

## 2021-02-15 VITALS
OXYGEN SATURATION: 96 % | DIASTOLIC BLOOD PRESSURE: 72 MMHG | WEIGHT: 219 LBS | RESPIRATION RATE: 16 BRPM | BODY MASS INDEX: 37.39 KG/M2 | TEMPERATURE: 97.9 F | HEART RATE: 78 BPM | SYSTOLIC BLOOD PRESSURE: 122 MMHG | HEIGHT: 64 IN

## 2021-02-15 DIAGNOSIS — B00.9 HERPES SIMPLEX: ICD-10-CM

## 2021-02-15 DIAGNOSIS — L20.9 ATOPIC DERMATITIS, UNSPECIFIED TYPE: Primary | ICD-10-CM

## 2021-02-15 DIAGNOSIS — I10 HYPERTENSION, UNSPECIFIED TYPE: ICD-10-CM

## 2021-02-15 PROCEDURE — G8484 FLU IMMUNIZE NO ADMIN: HCPCS | Performed by: NURSE PRACTITIONER

## 2021-02-15 PROCEDURE — 1036F TOBACCO NON-USER: CPT | Performed by: NURSE PRACTITIONER

## 2021-02-15 PROCEDURE — G8417 CALC BMI ABV UP PARAM F/U: HCPCS | Performed by: NURSE PRACTITIONER

## 2021-02-15 PROCEDURE — G8427 DOCREV CUR MEDS BY ELIG CLIN: HCPCS | Performed by: NURSE PRACTITIONER

## 2021-02-15 PROCEDURE — 99214 OFFICE O/P EST MOD 30 MIN: CPT | Performed by: NURSE PRACTITIONER

## 2021-02-15 RX ORDER — TRIAMCINOLONE ACETONIDE 1 MG/G
CREAM TOPICAL
Qty: 60 G | Refills: 1 | Status: SHIPPED
Start: 2021-02-15 | End: 2022-04-18

## 2021-02-15 RX ORDER — DOCOSANOL 100 MG/G
CREAM TOPICAL
Qty: 1 TUBE | Refills: 1 | Status: ON HOLD | COMMUNITY
Start: 2021-02-15 | End: 2022-03-30

## 2021-02-15 ASSESSMENT — ENCOUNTER SYMPTOMS
WHEEZING: 0
SHORTNESS OF BREATH: 0
DIARRHEA: 0
SINUS PAIN: 0
TROUBLE SWALLOWING: 0
COLOR CHANGE: 0
VOMITING: 0
NAUSEA: 0
EYE PAIN: 0
CHEST TIGHTNESS: 0
CONSTIPATION: 0
COUGH: 0
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
## 2021-02-15 NOTE — PATIENT INSTRUCTIONS
Patient Education        Anxiety Disorder: Care Instructions  Your Care Instructions     Anxiety is a normal reaction to stress. Difficult situations can cause you to have symptoms such as sweaty palms and a nervous feeling. In an anxiety disorder, the symptoms are far more severe. Constant worry, muscle tension, trouble sleeping, nausea and diarrhea, and other symptoms can make normal daily activities difficult or impossible. These symptoms may occur for no reason, and they can affect your work, school, or social life. Medicines, counseling, and self-care can all help. Follow-up care is a key part of your treatment and safety. Be sure to make and go to all appointments, and call your doctor if you are having problems. It's also a good idea to know your test results and keep a list of the medicines you take. How can you care for yourself at home? · Take medicines exactly as directed. Call your doctor if you think you are having a problem with your medicine. · Go to your counseling sessions and follow-up appointments. · Recognize and accept your anxiety. Then, when you are in a situation that makes you anxious, say to yourself, \"This is not an emergency. I feel uncomfortable, but I am not in danger. I can keep going even if I feel anxious. \"  · Be kind to your body:  ? Relieve tension with exercise or a massage. ? Get enough rest.  ? Avoid alcohol, caffeine, nicotine, and illegal drugs. They can increase your anxiety level and cause sleep problems. ? Learn and do relaxation techniques. See below for more about these techniques. · Engage your mind. Get out and do something you enjoy. Go to a funny movie, or take a walk or hike. Plan your day. Having too much or too little to do can make you anxious. · Keep a record of your symptoms. Discuss your fears with a good friend or family member, or join a support group for people with similar problems. Talking to others sometimes relieves stress. · Get involved in social groups, or volunteer to help others. Being alone sometimes makes things seem worse than they are. · Get at least 30 minutes of exercise on most days of the week to relieve stress. Walking is a good choice. You also may want to do other activities, such as running, swimming, cycling, or playing tennis or team sports. Relaxation techniques  Do relaxation exercises 10 to 20 minutes a day. You can play soothing, relaxing music while you do them, if you wish. · Tell others in your house that you are going to do your relaxation exercises. Ask them not to disturb you. · Find a comfortable place, away from all distractions and noise. · Lie down on your back, or sit with your back straight. · Focus on your breathing. Make it slow and steady. · Breathe in through your nose. Breathe out through either your nose or mouth. · Breathe deeply, filling up the area between your navel and your rib cage. Breathe so that your belly goes up and down. · Do not hold your breath. · Breathe like this for 5 to 10 minutes. Notice the feeling of calmness throughout your whole body. As you continue to breathe slowly and deeply, relax by doing the following for another 5 to 10 minutes:  · Tighten and relax each muscle group in your body. You can begin at your toes and work your way up to your head. · Imagine your muscle groups relaxing and becoming heavy. · Empty your mind of all thoughts. · Let yourself relax more and more deeply. · Become aware of the state of calmness that surrounds you. · When your relaxation time is over, you can bring yourself back to alertness by moving your fingers and toes and then your hands and feet and then stretching and moving your entire body. Sometimes people fall asleep during relaxation, but they usually wake up shortly afterward.

## 2021-02-19 ENCOUNTER — HOSPITAL ENCOUNTER (EMERGENCY)
Age: 31
Discharge: HOME | End: 2021-02-19
Attending: EMERGENCY MEDICINE
Payer: COMMERCIAL

## 2021-02-19 ENCOUNTER — APPOINTMENT (OUTPATIENT)
Dept: ULTRASOUND IMAGING | Age: 31
End: 2021-02-19
Payer: COMMERCIAL

## 2021-02-19 ENCOUNTER — APPOINTMENT (OUTPATIENT)
Dept: GENERAL RADIOLOGY | Age: 31
End: 2021-02-19
Payer: COMMERCIAL

## 2021-02-19 VITALS
DIASTOLIC BLOOD PRESSURE: 79 MMHG | RESPIRATION RATE: 16 BRPM | HEART RATE: 83 BPM | TEMPERATURE: 97.3 F | OXYGEN SATURATION: 94 % | SYSTOLIC BLOOD PRESSURE: 138 MMHG

## 2021-02-19 DIAGNOSIS — S82.302A CLOSED FRACTURE OF DISTAL END OF LEFT TIBIA, UNSPECIFIED FRACTURE MORPHOLOGY, INITIAL ENCOUNTER: Primary | ICD-10-CM

## 2021-02-19 PROCEDURE — 93971 EXTREMITY STUDY: CPT

## 2021-02-19 PROCEDURE — 73590 X-RAY EXAM OF LOWER LEG: CPT

## 2021-02-19 PROCEDURE — 93971 EXTREMITY STUDY: CPT | Performed by: RADIOLOGY

## 2021-02-19 PROCEDURE — 99283 EMERGENCY DEPT VISIT LOW MDM: CPT

## 2021-02-19 PROCEDURE — 6370000000 HC RX 637 (ALT 250 FOR IP): Performed by: NURSE PRACTITIONER

## 2021-02-19 PROCEDURE — 29515 APPLICATION SHORT LEG SPLINT: CPT

## 2021-02-19 RX ORDER — DIPHENHYDRAMINE HCL 25 MG
25 TABLET ORAL ONCE
Status: COMPLETED | OUTPATIENT
Start: 2021-02-19 | End: 2021-02-19

## 2021-02-19 RX ORDER — IBUPROFEN 400 MG/1
400 TABLET ORAL ONCE
Status: COMPLETED | OUTPATIENT
Start: 2021-02-19 | End: 2021-02-19

## 2021-02-19 RX ORDER — IBUPROFEN 600 MG/1
600 TABLET ORAL EVERY 6 HOURS PRN
Qty: 28 TABLET | Refills: 0 | Status: ON HOLD | OUTPATIENT
Start: 2021-02-19 | End: 2022-04-05 | Stop reason: HOSPADM

## 2021-02-19 RX ADMIN — DIPHENHYDRAMINE HYDROCHLORIDE 25 MG: 25 TABLET ORAL at 12:43

## 2021-02-19 RX ADMIN — IBUPROFEN 400 MG: 400 TABLET ORAL at 10:10

## 2021-02-19 ASSESSMENT — PAIN SCALES - GENERAL
PAINLEVEL_OUTOF10: 8
PAINLEVEL_OUTOF10: 6

## 2021-02-19 NOTE — CONSULTS
wheezing  CARDIOVASCULAR:  negative for  chest pain, palpitations  GASTROINTESTINAL:  negative for nausea, vomiting  GENITOURINARY:  negative for frequency, urinary incontinence  HEMATOLOGIC/LYMPHATIC:  negative for bleeding and petechiae  MUSCULOSKELETAL:  See HPI  NEUROLOGICAL:  negative for headaches, dizziness  BEHAVIOR/PSYCH:  negative for increased agitation and anxiety    PHYSICAL EXAM:    VITALS:  /79   Pulse 83   Temp 97.3 °F (36.3 °C)   Resp 16   SpO2 94%   CONSTITUTIONAL:  awake, alert, cooperative, no apparent distress, and appears stated age  MUSCULOSKELETAL:  Left lower Extremity:  · Posterior slab splint in place, ACE bandage overlying the entire lower extremity  · Tenderness to palpation over the anterior aspect of the distal shin, approximately 10 cm from the joint line  · Tenderness to palpation over the medial and lateral malleolus  · Active plantar flexion and dorsiflexion of the toes  · Sensation intact over the dorsum of the foot  · Toes warm and well-perfused  · No tenderness to palpation over the proximal fibula, knee, or pain with logroll of the hip    Secondary Exam:   · bilateralUE: No obvious signs of trauma. -TTP to fingers, hand, wrist, forearm, elbow, humerus, shoulder or clavicle. -- Patient able to flex/extend fingers, wrist, elbow and shoulder with active and passive ROM without pain, +2/4 Radial pulse, cap refill <3sec, +AIN/PIN/Radial/Ulnar/Median N, distal sensation grossly intact to C4-T1 dermatomes, compartments soft and compressible. · rightLE: No obvious signs of trauma. -TTP to foot, ankle, leg, knee, thigh, hip.-- Patient able to flex/extend toes, ankle, knee and hip with active and passive ROM without pain,+2/4 DP & PT pulses, cap refill <3sec, +5/5 PF/DF/EHL, distal sensation grossly intact to L4-S1 dermatomes, compartments soft and compressible.     · Pelvis: -TTP, -Log roll, -Heel strike     DATA:    CBC:   Lab Results   Component Value Date    WBC 5.5 09/14/2020    RBC 4.40 09/14/2020    HGB 13.6 09/14/2020    HCT 40.6 09/14/2020    MCV 92.3 09/14/2020    MCH 30.9 09/14/2020    MCHC 33.5 09/14/2020    RDW 13.3 09/14/2020     09/14/2020    MPV 11.0 09/14/2020     PT/INR:    Lab Results   Component Value Date    PROTIME 12.4 06/30/2018    INR 1.1 06/30/2018       Radiology Review:  X-ray imaging of the left tibia and fibula reviewed. Images from 2018 available for comparison. There are interval changes to the medial malleolus, lateral process of the talus, and posterior aspect of the tibia. There is also widening of the tibiofibular clear space as compared to previous imaging. The changes to the lateral process of the talus appear to be chronic. The posterior malleolus injury appears to be chronic as well. There is possible acute fracture of the anterior colliculus of the tibia. IMPRESSION:  · Left ankle pain, chronic injury to the ankle, likely syndesmotic injury, acute exacerbation    PLAN:  · Patient already been placed in a posterior slab splint in the emergency department. Unfortunately she had an obligation and was unable to stay long enough for me to remove the splint and complete a more comprehensive exam.  Dedicated x-ray films would also be helpful however she was not able to stay for those either.   · Recommend close follow-up with orthopedic surgery outpatient, dedicated ankle films may be taken and a more thorough evaluation may be done  · Nonweightbearing to the left lower extremity, keep posterior slab splint clean, dry and intact  · Pain medications per emergency department, DVT prophylaxis per emergency department  · Follow-up outpatient with Dr. Africa Morton  · Discussed with attending

## 2021-02-19 NOTE — ED PROVIDER NOTES
Saturation Interpretation: Normal.        ED Triage Vitals   BP Temp Temp src Pulse Resp SpO2 Height Weight   02/19/21 0945 02/19/21 0932 -- 02/19/21 0932 02/19/21 0932 02/19/21 0932 -- --   138/79 97.3 °F (36.3 °C)  83 16 94 %         Physical Exam  Constitutional/General: Alert and oriented x3, well appearing, non toxic  HEENT:  NC/NT. PERRLA,  Airway patent. Neck/Back: Supple, full ROM, non tender to palpation in the midline cervical, thoracic and lumbar spines. No paraspinal tenderness. Respiratory: Lungs clear to auscultation bilaterally, no wheezes, rales, or rhonchi. Not in respiratory distress  CV:  Regular rate. Regular rhythm. No murmurs, gallops, or rubs. 2+ distal pulses  Musculoskeletal: Moves all extremities x 4. Warm and well perfused, no clubbing, cyanosis, or edema except to the left lower extremity where there is mild to moderate swelling of the mid to distal aspect of the tib-fib with ecchymosis and tenderness along the tibia. No crepitus or deformity. There is no calf tenderness or palpable cords. There is mild localized ankle edema without bony tenderness to the lateral or medial malleoli. Negative Homans' sign. Strong left posterior tibial and pedal pulses. Neurovascularly intact. Compartments soft. No bony tenderness to the left knee, proximal tib/fib, left ankle or left foot. No other bony tenderness. Capillary refill <3 seconds  Integument: skin warm and dry. No rashes. No open wounds. Ecchymosis to the left lower leg as described above. Otherwise no other outward sign of trauma. Lymphatic: no lymphadenopathy noted  Neurologic: GCS 15, no focal deficits, symmetric strength 5/5 in the upper and lower extremities bilaterally  Psychiatric: Normal Affect    Lab / Imaging Results   (All laboratory and radiology results have been personally reviewed by myself)  Labs:  No results found for this visit on 02/19/21. Imaging:   All Radiology results interpreted by Radiologist unless otherwise noted. US DUP LOWER EXTREMITY LEFT PACHECO   Final Result   Within the visualized vessels there is no evidence for deep venous   thrombosis      XR TIBIA FIBULA LEFT (2 VIEWS)   Final Result   Cortical irregularity of distal tibia is indeterminate. Finding likely   chronic, but could represent nondisplaced fracture in the setting of trauma. RECOMMENDATION:   Further evaluation with ankle CT may be considered if clinically warranted. ED Course / Medical Decision Making     Medications   ibuprofen (ADVIL;MOTRIN) tablet 400 mg (400 mg Oral Given 2/19/21 1010)   diphenhydrAMINE (BENADRYL) tablet 25 mg (25 mg Oral Given 2/19/21 1243)        Re-examination:  2/19/21       Time: 1200  Patients condition is improving after treatment. Updated on x-ray result and ortho consult. Time: 1240  Benadryl request as she takes it frequently at home. She has no stridor, angioedema or rash. She reports taking it for her COPD. Her lung sounds are clear and equal bilaterally with no respiratory distress. Time: 1410  Patient evaluated by Dr. Bety Fuentes as patient can no longer wait for orthopedics due to childcare constraints and the recent death of her mother. She was placed in a short leg splint and crutch education completed. Outpatient follow-up with orthopedics discussed. Time: 1419  Upon checking the fiberglass splint, Dr. Iván Washington with orthopedics presented to the bedside for evaluation of the patient. Consult(s):   IP CONSULT TO ORTHOPEDIC SURGERY   Time: 1210 Ortho coming to evaluate patient after their procedure   Time: 0151 patient verbalizing that she can no longer wait for Ortho due to childcare constraints. Resident repaged. Procedure(s):   none    MDM:   X-rays interpreted by radiologist shows that fracture of indeterminate age but new since 2018. Patient denies a history of fracture in the past and this is an area of point tenderness with associated traumatic incident.   Ultrasound is interpreted by radiologist negative for DVT as suspected. Case was discussed with Dr. Roberto Pena and he suggest consultation to orthopedics regarding need for CT or not. While in the operating room, the orthopedics resident evaluated the x-ray images and stated that they wanted to come evaluate the patient for themselves prior to commenting on the need for CT. After waiting several hours, patient verbalized needing to leave. She was placed in a short leg splint and given crutch education. Just prior to departing the orthopedic resident was able to come evaluate her. Plan is for continued outpatient management with orthopedics which she will call on Monday to arrange outpatient follow-up and they would request dedicated ankle x-rays at that time. NSAID, elevation and ice with splint care until then and a work note was provided. Patient verbalizes understanding of instructions as well as signs and symptoms indicative of reevaluation in the emergency department setting. Patient departed in stable condition and non-weight bearing. Plan of Care/Counseling:  Emergency Attending Physician, orthopedic resident and I reviewed today's visit with the patient in addition to providing specific details for the plan of care and counseling regarding the diagnosis and prognosis. Questions are answered at this time and are agreeable with the plan. ASSESSMENT     1. Closed fracture of distal end of left tibia, unspecified fracture morphology, initial encounter      PLAN   Discharge home. Patient condition is stable    New Medications     Discharge Medication List as of 2/19/2021  2:03 PM      START taking these medications    Details   ibuprofen (IBU) 600 MG tablet Take 1 tablet by mouth every 6 hours as needed for Pain, Disp-28 tablet, R-0Normal           Electronically signed by AKHIL Mendez CNP   DD: 2/19/21  **This report was transcribed using voice recognition software.  Every effort was made to ensure accuracy; however, inadvertent computerized transcription errors may be present.   END OF ED PROVIDER NOTE     AKHIL Mccoy - GINI  02/19/21 1857

## 2021-02-21 ENCOUNTER — APPOINTMENT (OUTPATIENT)
Dept: GENERAL RADIOLOGY | Age: 31
End: 2021-02-21
Payer: COMMERCIAL

## 2021-02-21 ENCOUNTER — HOSPITAL ENCOUNTER (EMERGENCY)
Age: 31
Discharge: HOME | End: 2021-02-21
Payer: COMMERCIAL

## 2021-02-21 VITALS
SYSTOLIC BLOOD PRESSURE: 122 MMHG | TEMPERATURE: 97.6 F | OXYGEN SATURATION: 98 % | HEART RATE: 90 BPM | WEIGHT: 220 LBS | RESPIRATION RATE: 18 BRPM | BODY MASS INDEX: 38.98 KG/M2 | HEIGHT: 63 IN | DIASTOLIC BLOOD PRESSURE: 74 MMHG

## 2021-02-21 DIAGNOSIS — U07.1 COVID-19 VIRUS INFECTION: Primary | ICD-10-CM

## 2021-02-21 DIAGNOSIS — S93.402A SPRAIN OF LEFT ANKLE, UNSPECIFIED LIGAMENT, INITIAL ENCOUNTER: ICD-10-CM

## 2021-02-21 PROCEDURE — 99284 EMERGENCY DEPT VISIT MOD MDM: CPT

## 2021-02-21 PROCEDURE — 71046 X-RAY EXAM CHEST 2 VIEWS: CPT

## 2021-02-21 PROCEDURE — 73610 X-RAY EXAM OF ANKLE: CPT

## 2021-02-21 NOTE — ED PROVIDER NOTES
[facility]  Department of Emergency Medicine   ED  Encounter Note  Admit Date/RoomTime: 2021 10:50 AM  ED Room: 34/34    NAME: Yusra Raygoza  : 1990  MRN: [id number]     Chief Complaint:  Positive For Covid-19 (SOB, states she tested + on Wed)    History of Present Illness       Yusra Raygoza is a 27 y.o. old female who presents to the emergency department by private vehicle, for testing positive for COVID-19 with  and chronic shortness of breath. 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 For her own curiosity she did obtain a third sample testing on Friday however the results would not be available until Monday. She is concerned because she has a history of lung disease requiring inhalers although she denies any real symptoms associated with Covid. Also she is concerned about her left ankle where she was seen here 2 days ago for an injury where preliminary x-rays of her tibial and fibular were obtained with questionable cortical irregularity by radiology interpretation requesting additional imaging. Apparently she did have to leave before the imaging could have been obtained and is requesting that we obtain the additional requested images today. There has been NO abdominal pain, decreased appetite, productive cough, nausea, vomiting, diarrhea, dizziness, dysuria, urinary frequency, earache, fever, wheezing, loss of taste or loss of smell. ROS   Pertinent positives and negatives are stated within HPI, all other systems reviewed and are negative. Past Medical History:  has a past medical history of Herpes genitalia, Paranoid schizophrenia ([address] Seizures ([facility] [address] and Ulcer. Surgical History:  has no past surgical history on file. Social History:  reports that she has quit smoking.  Her smoking use included cigarettes. She smoked 0.25 packs per day. She has never used smokeless tobacco. She reports that she does not drink alcohol or use drugs. Family History: family history includes Diabetes in her father and mother; Other in her brother. Allergies: Cherry, Cherry flavor, Strawberry extract, and Strawberry flavor    Physical Exam   Oxygen Saturation Interpretation: Normal.        ED Triage Vitals   BP Temp Temp src Pulse Resp SpO2 Height Weight   02/21/21 1053 02/21/21 1033 -- 02/21/21 1033 02/21/21 1053 02/21/21 1033 02/21/21 1053 02/21/21 1053   129/89 97.8 °F (36.6 °C)  104 17 95 % 5' 3\" (1.6 m) 220 lb (99.8 kg)         · Constitutional:  Alert, development consistent with age. · Ears:  External Ears: Bilateral normal.               TM's & External Canals: normal appearance. · Nose:   There is no discharge, swelling or lesions noted. · Sinuses: no Bilateral maxillary sinus tenderness. no Bilateral frontal sinus tenderness. · Mouth:  normal tongue and buccal mucosa. · Throat: no erythema or exudates noted. Teeth and gums normal..  Airway Patent. · Neck:  Supple. There is no  anterior cervical and posterior cervical node tenderness. · Respiratory:   Breath sounds: Bilateral normal.  Lung sounds: normal.   · CV:  Regular rate and rhythm, normal heart sounds, without pathological murmurs, ectopy, gallops, or rubs. · GI:  Abdomen Soft, nontender, good bowel sounds. No firm or pulsatile mass. · Ext: There is a posterior fiberglass splint present on the left lower extremity extending from popliteal fossa to the toes. No obvious swelling. Remainder of findings are nontraumatic and nontender. · Integument:  Normal turgor. Warm, dry, without visible rash. · Neurological:  Oriented. Motor functions intact.        Lab / Imaging Results   (All laboratory and radiology results have been personally reviewed by myself)  Labs:  No results found for this visit on 02/21/21. Imaging: All Radiology results interpreted by Radiologist unless otherwise noted. XR ANKLE LEFT (MIN 3 VIEWS)   Final Result   Corticated appearing irregularity of the tibia of the [place] syndesmosis   appears most likely chronic. If continued clinical concern CT could be   obtained      XR CHEST (2 VW)   Final Result   No acute process. ED Course / Medical Decision Making   Medications - No data to display     Re-examination:  2/21/21       Time: 9028   Patients condition remains stable. Medical Decision Making:    Based on low suspicion for pneumonia as per history/physical findings, imaging was done and confirms the above findings.  Based on previously testing positive for COVID-19, testing was not done.   She is not hypoxic. Patient is well appearing, non toxic and appropriate for outpatient management. Is recommended to continue wearing ankle splint until she is seen by orthopedics as there is no clear evidence of acute fracture but in the setting of recent injury she may require advanced imaging. Counseling:  I reviewed today's visit with the patient in addition to providing specific details for the plan of care and counseling regarding the diagnosis and prognosis. Questions are answered at this time and are agreeable with the plan. Assessment     1. COVID-19 virus infection    2. Sprain of left ankle, unspecified ligament, initial encounter      Plan   Discharge home. Patient condition is good    New Medications     New Prescriptions    No medications on file     Electronically signed by JOSE Hester   DD: 2/21/21  **This report was transcribed using voice recognition software. Every effort was made to ensure accuracy; however, inadvertent computerized transcription errors may be present.   END OF ED PROVIDER NOTE         Joselin Lomelima  02/21/21 8939

## 2021-02-21 NOTE — ED NOTES
Pt alert and oriented x4. Speech clear. Respirations easy/unlabored. Skin warm/dry. Appropriate color. No signs of acute distress noted. Pt/family teaching provided; verbalized understanding. Pt stable for discharge.        Fadi Macedo RN  02/21/21 5510

## 2021-02-22 ENCOUNTER — TELEPHONE (OUTPATIENT)
Dept: ADMINISTRATIVE | Age: 31
End: 2021-02-22

## 2021-02-22 ENCOUNTER — CARE COORDINATION (OUTPATIENT)
Dept: CARE COORDINATION | Age: 31
End: 2021-02-22

## 2021-02-22 NOTE — TELEPHONE ENCOUNTER
Patient seen in ER 2/21 and 2/19 for LT ankle sprain/ LT leg pain. Calling to make a follow up appointment. Patient tested positive for COVID 2/17. Would also like a call back for advice regarding her ankle wrap. Hospital wrapped left ankle and patient had questions about removing the wrap.

## 2021-02-22 NOTE — TELEPHONE ENCOUNTER
ED visit: 02/19/21 (doris)    Fall 02/17/21    IMPRESSION:  · Left ankle pain, chronic injury to the ankle, likely syndesmotic injury, acute exacerbation    Per Dr. West Lights note, \" Patient already been placed in a posterior slab splint in the emergency department.   Unfortunately she had an obligation and was unable to stay long enough for me to remove the splint and complete \"    Positive covid test 02/17/21    Routed for recommendations

## 2021-02-22 NOTE — CARE COORDINATION
Patient contacted regarding [id number] diagnosis\". Discussed COVID-19 related testing which was available at this time. Test results were positive. Patient informed of results, if available? Yes  Per patient tested positive at her place of employment on  ([facility]) and also at [facility]). Care Transition Nurse/ Ambulatory Care Manager contacted the patient by telephone to perform post discharge assessment. Call within 2 business days of discharge: Yes. Verified name and  with patient as identifiers. Provided introduction to self, and explanation of the CTN/ACM role, and reason for call due to risk factors for infection and/or exposure to COVID-19. Symptoms reviewed with patient who verbalized the following symptoms: no new symptoms and no worsening symptoms. Patient states she is currently asymptomatic. Due to no new or worsening symptoms encounter was not routed to provider for escalation. Discussed follow-up appointments. If no appointment was previously scheduled, appointment scheduling offered: Yes  [facility] follow up appointment(s): Patient states she will call her PCP's office to schedule f/u VV as instructed by ED. Patient inquired about care of ankle sprain. ACM recommended she call Dr Luis Antonio Huffman office, patient verbalized understanding. ACM gave patient the contact information to cancel [address] [place] [address],[address] appointment for , patient verbalized understanding. Future Appointments   Date Time Provider Prasanna Tabares   2021  8:00 AM [facility] RM 1 SEY[facility]  [facility]     Non-[facility] follow up appointment(s): na    Non-face-to-face services provided:  Obtained and reviewed discharge summary and/or continuity of care documents, educated patient on increasing rest, increasing fluid intake, use of tylenol for headaches, body aches, and fever, and s/sx of worsening condition. Advance Care Planning:   Does patient have an Advance Directive:  reviewed and current.      Patient has following risk factors of: asthma. CTN/ACM reviewed discharge instructions, medical action plan and red flags such as increased shortness of breath, increasing fever and signs of decompensation with patient who verbalized understanding. Discussed exposure protocols and quarantine with CDC Guidelines What to do if you are sick with coronavirus disease 2019.  Patient was given an opportunity for questions and concerns. The patient agrees to contact the [facility] exposure line [phone], local [facility] department [place] [facility]: ([phone]) and PCP office for questions related to their healthcare. CTN/ACM provided contact information for future needs. Reviewed and educated patient on any new and changed medications related to discharge diagnosis  No medications prescribed in ED. Patient/family/caregiver given information for [employer] and agrees to enroll yes  Patient's preferred e-mail: n/a   Patient's preferred phone number: [phone]  Based on Loop alert triggers, patient will be contacted by nurse care manager for worsening symptoms. Pt will be further monitored by COVID Loop Team based on severity of symptoms and risk factors. Flor Geiger, discussed the care coordination program, patient declined.

## 2021-02-23 ENCOUNTER — HOSPITAL ENCOUNTER (EMERGENCY)
Age: 31
Discharge: HOME | End: 2021-02-23
Attending: EMERGENCY MEDICINE
Payer: COMMERCIAL

## 2021-02-23 ENCOUNTER — APPOINTMENT (OUTPATIENT)
Dept: CT IMAGING | Age: 31
End: 2021-02-23
Payer: COMMERCIAL

## 2021-02-23 ENCOUNTER — APPOINTMENT (OUTPATIENT)
Dept: GENERAL RADIOLOGY | Age: 31
End: 2021-02-23
Payer: COMMERCIAL

## 2021-02-23 VITALS
HEART RATE: 83 BPM | TEMPERATURE: 97.5 F | DIASTOLIC BLOOD PRESSURE: 79 MMHG | WEIGHT: 220 LBS | RESPIRATION RATE: 18 BRPM | BODY MASS INDEX: 38.98 KG/M2 | SYSTOLIC BLOOD PRESSURE: 114 MMHG | OXYGEN SATURATION: 99 % | HEIGHT: 63 IN

## 2021-02-23 DIAGNOSIS — J12.82 PNEUMONIA DUE TO COVID-19 VIRUS: Primary | ICD-10-CM

## 2021-02-23 DIAGNOSIS — U07.1 COVID-19: ICD-10-CM

## 2021-02-23 DIAGNOSIS — U07.1 PNEUMONIA DUE TO COVID-19 VIRUS: Primary | ICD-10-CM

## 2021-02-23 LAB
ALBUMIN SERPL-MCNC: 4.2 G/DL (ref 3.5–5.2)
ALP BLD-CCNC: 59 U/L (ref 35–104)
ALT SERPL-CCNC: 32 U/L (ref 0–32)
AMPHETAMINE SCREEN, URINE: NOT DETECTED
ANION GAP SERPL CALCULATED.3IONS-SCNC: 11 MMOL/L (ref 7–16)
AST SERPL-CCNC: 28 U/L (ref 0–31)
BARBITURATE SCREEN URINE: NOT DETECTED
BASOPHILS ABSOLUTE: 0.01 E9/L (ref 0–0.2)
BASOPHILS RELATIVE PERCENT: 0.3 % (ref 0–2)
BENZODIAZEPINE SCREEN, URINE: NOT DETECTED
BILIRUB SERPL-MCNC: 0.3 MG/DL (ref 0–1.2)
BUN BLDV-MCNC: 17 MG/DL (ref 6–20)
CALCIUM SERPL-MCNC: 8.9 MG/DL (ref 8.6–10.2)
CANNABINOID SCREEN URINE: NOT DETECTED
CHLORIDE BLD-SCNC: 100 MMOL/L (ref 98–107)
CO2: 25 MMOL/L (ref 22–29)
COCAINE METABOLITE SCREEN URINE: NOT DETECTED
CREAT SERPL-MCNC: 0.6 MG/DL (ref 0.5–1)
D DIMER: 307 NG/ML DDU
EKG ATRIAL RATE: 74 BPM
EKG P AXIS: 32 DEGREES
EKG P-R INTERVAL: 198 MS
EKG Q-T INTERVAL: 396 MS
EKG QRS DURATION: 94 MS
EKG QTC CALCULATION (BAZETT): 439 MS
EKG R AXIS: 23 DEGREES
EKG T AXIS: 9 DEGREES
EKG VENTRICULAR RATE: 74 BPM
EOSINOPHILS ABSOLUTE: 0.02 E9/L (ref 0.05–0.5)
EOSINOPHILS RELATIVE PERCENT: 0.7 % (ref 0–6)
FENTANYL SCREEN, URINE: NOT DETECTED
GFR AFRICAN AMERICAN: >60
GFR NON-AFRICAN AMERICAN: >60 ML/MIN/1.73
GLUCOSE BLD-MCNC: 93 MG/DL (ref 74–99)
HCG(URINE) PREGNANCY TEST: NEGATIVE
HCT VFR BLD CALC: 40.8 % (ref 34–48)
HEMOGLOBIN: 13.6 G/DL (ref 11.5–15.5)
IMMATURE GRANULOCYTES #: 0.01 E9/L
IMMATURE GRANULOCYTES %: 0.3 % (ref 0–5)
LYMPHOCYTES ABSOLUTE: 1.14 E9/L (ref 1.5–4)
LYMPHOCYTES RELATIVE PERCENT: 37.9 % (ref 20–42)
Lab: NORMAL
MAGNESIUM: 1.9 MG/DL (ref 1.6–2.6)
MCH RBC QN AUTO: 28.9 PG (ref 26–35)
MCHC RBC AUTO-ENTMCNC: 33.3 % (ref 32–34.5)
MCV RBC AUTO: 86.8 FL (ref 80–99.9)
METHADONE SCREEN, URINE: NOT DETECTED
MONOCYTES ABSOLUTE: 0.29 E9/L (ref 0.1–0.95)
MONOCYTES RELATIVE PERCENT: 9.6 % (ref 2–12)
NEUTROPHILS ABSOLUTE: 1.54 E9/L (ref 1.8–7.3)
NEUTROPHILS RELATIVE PERCENT: 51.2 % (ref 43–80)
OPIATE SCREEN URINE: NOT DETECTED
OXYCODONE URINE: NOT DETECTED
PDW BLD-RTO: 13.1 FL (ref 11.5–15)
PHENCYCLIDINE SCREEN URINE: NOT DETECTED
PLATELET # BLD: 166 E9/L (ref 130–450)
PMV BLD AUTO: 11.4 FL (ref 7–12)
POTASSIUM SERPL-SCNC: 3.6 MMOL/L (ref 3.5–5)
PRO-BNP: 12 PG/ML (ref 0–125)
RBC # BLD: 4.7 E12/L (ref 3.5–5.5)
SARS-COV-2, NAAT: DETECTED
SODIUM BLD-SCNC: 136 MMOL/L (ref 132–146)
TOTAL PROTEIN: 7.6 G/DL (ref 6.4–8.3)
TROPONIN: <0.01 NG/ML (ref 0–0.03)
WBC # BLD: 3 E9/L (ref 4.5–11.5)

## 2021-02-23 PROCEDURE — 99284 EMERGENCY DEPT VISIT MOD MDM: CPT

## 2021-02-23 PROCEDURE — 36415 COLL VENOUS BLD VENIPUNCTURE: CPT

## 2021-02-23 PROCEDURE — 71275 CT ANGIOGRAPHY CHEST: CPT

## 2021-02-23 PROCEDURE — 93005 ELECTROCARDIOGRAM TRACING: CPT | Performed by: STUDENT IN AN ORGANIZED HEALTH CARE EDUCATION/TRAINING PROGRAM

## 2021-02-23 PROCEDURE — 83735 ASSAY OF MAGNESIUM: CPT

## 2021-02-23 PROCEDURE — 80053 COMPREHEN METABOLIC PANEL: CPT

## 2021-02-23 PROCEDURE — 93010 ELECTROCARDIOGRAM REPORT: CPT | Performed by: INTERNAL MEDICINE

## 2021-02-23 PROCEDURE — 71045 X-RAY EXAM CHEST 1 VIEW: CPT

## 2021-02-23 PROCEDURE — 2580000003 HC RX 258: Performed by: RADIOLOGY

## 2021-02-23 PROCEDURE — 84484 ASSAY OF TROPONIN QUANT: CPT

## 2021-02-23 PROCEDURE — 83880 ASSAY OF NATRIURETIC PEPTIDE: CPT

## 2021-02-23 PROCEDURE — 87635 SARS-COV-2 COVID-19 AMP PRB: CPT

## 2021-02-23 PROCEDURE — 81025 URINE PREGNANCY TEST: CPT

## 2021-02-23 PROCEDURE — 6360000004 HC RX CONTRAST MEDICATION: Performed by: RADIOLOGY

## 2021-02-23 PROCEDURE — 85025 COMPLETE CBC W/AUTO DIFF WBC: CPT

## 2021-02-23 PROCEDURE — 85378 FIBRIN DEGRADE SEMIQUANT: CPT

## 2021-02-23 PROCEDURE — 80307 DRUG TEST PRSMV CHEM ANLYZR: CPT

## 2021-02-23 RX ORDER — SODIUM CHLORIDE 0.9 % (FLUSH) 0.9 %
10 SYRINGE (ML) INJECTION
Status: COMPLETED | OUTPATIENT
Start: 2021-02-23 | End: 2021-02-23

## 2021-02-23 RX ADMIN — Medication 10 ML: at 13:25

## 2021-02-23 RX ADMIN — IOPAMIDOL 60 ML: 755 INJECTION, SOLUTION INTRAVENOUS at 13:25

## 2021-02-23 NOTE — TELEPHONE ENCOUNTER
2 weeks with [facility] or on res clinic.  Patient tested positive for COVID, must be completely asymptomatic for follow up in outpatient clinic  Electronically signed by Nan Lau PA-C on 2/23/2021 at 12:18 PM

## 2021-02-23 NOTE — ACP (ADVANCE CARE PLANNING)
Advance Care Planning     Advance Care Planning Activator (Inpatient)  Conversation Note      Date of ACP Conversation: 2/23/2021    Conversation Conducted with: Patient with Decision Making Capacity    ACP Activator: [address], [address] [place] [address] Decision Maker:     Current Designated Health Care Decision Maker:     Primary Decision Maker: Eulalio Johnson - Parent - [phone]    Secondary Decision Maker: Ana Calixto - Brother/Sister - [phone]  Click here to complete Healthcare Decision Makers including section of the Healthcare Decision Maker Relationship (ie \"Primary\")  Today we documented Decision Maker(s) consistent with Legal Next of Kin hierarchy. Care Preferences    Ventilation: \"If you were in your present state of health and suddenly became very ill and were unable to breathe on your own, what would your preference be about the use of a ventilator (breathing machine) if it were available to you? \"      Would the patient desire the use of ventilator (breathing machine)?: yes    \"If your health worsens and it becomes clear that your chance of recovery is unlikely, what would your preference be about the use of a ventilator (breathing machine) if it were available to you? \"     Would the patient desire the use of ventilator (breathing machine)?: Yes      Resuscitation  \"CPR works best to restart the heart when there is a sudden event, like a heart attack, in someone who is otherwise healthy. Unfortunately, CPR does not typically restart the heart for people who have serious health conditions or who are very sick. \"    \"In the event your heart stopped as a result of an underlying serious health condition, would you want attempts to be made to restart your heart (answer \"yes\" for attempt to resuscitate) or would you prefer a natural death (answer \"no\" for do not attempt to resuscitate)? \" yes       [] Yes   [x] No   Educated Patient / Maren Hides regarding differences between Advance Directives and portable DNR orders.     Length of ACP Conversation in minutes: 5     Conversation Outcomes:  [x] ACP discussion completed  [] Existing advance directive reviewed with patient; no changes to patient's previously recorded wishes  [] New Advance Directive completed  [] Portable Do Not Rescitate prepared for Provider review and signature  [] POLST/POST/MOLST/MOST prepared for Provider review and signature      Follow-up plan:    [] Schedule follow-up conversation to continue planning  [] Referred individual to Provider for additional questions/concerns   [] Advised patient/agent/surrogate to review completed ACP document and update if needed with changes in condition, patient preferences or care setting    [x] This note routed to one or more involved healthcare providers

## 2021-02-23 NOTE — ED PROVIDER NOTES
ATTENDING PROVIDER ATTESTATION:     Valeria Ram presented to the emergency department for evaluation of Shortness of Breath (started around 2am, states her right hand started swelling up and she got scared. had negative COVID yesterday )   and was initially evaluated by the Medical Resident. See Original ED Note for H&P and ED course above. I have reviewed and discussed the case, including pertinent history (medical, surgical, family and social) and exam findings with the Medical Resident assigned to Valeria Ram. I have personally performed and/or participated in the history, exam, medical decision making, and procedures and agree with all pertinent clinical information and any additional changes or corrections are noted below in my assessment and plan. I have discussed this patient in detail with the resident, and provided the instruction and education,       I have reviewed my findings and recommendations with the assigned Medical Resident, Valeria Ram and members of family present at the time of disposition. I have performed a history and physical examination of this patient and directly supervised the resident caring for this patient      History of Present Illness:    Presents to the ED for shortness of breath, beginning around 2 AM.  The complaint has been persistent, mild in severity, and worsened by nothing. Patient reports that around 2 AM she started feeling short of breath. She reports that she felt like both lower extremities were puffy and that she might of been retaining fluid. She reports that she felt short of breath. She reports that she has a history of CHF and was worried she could be in heart failure. She reports the swelling has gone down over the course of the last few hours. No chest pain. No fevers or chills. She reports that she tested positive for coronavirus recently but then tested negative a few days later.   She said her doctor said she does not have it.  She is uncertain. She denies fevers or chills. No cough. No runny nose or congestion. No leg swelling. No orthopnea. She denies any other complaints. Review of Systems:   A complete review of systems was performed and pertinent positives and negatives are stated within HPI, all other systems reviewed and are negative.    --------------------------------------------- PAST HISTORY ---------------------------------------------  Past Medical History:  has a past medical history of Herpes genitalia, Paranoid schizophrenia ([facility] [address] [facility] and Ulcer. Past Surgical History:  has no past surgical history on file. Social History:  reports that she has been smoking cigarettes. She has been smoking about 0.25 packs per day. She has never used smokeless tobacco. She reports current alcohol use. She reports that she does not use drugs. Family History: family history includes Diabetes in her father and mother; Other in her brother. Unless otherwise noted, family history is non contributory    The patients home medications have been reviewed. Allergies: Cherry, Cherry flavor, Strawberry extract, and Strawberry flavor      Physical Exam:  Constitutional/General: Alert and oriented x3  Head: Normocephalic and atraumatic  Eyes: PERRL, EOMI, sclera non icteric  Mouth: Oropharynx clear, handling secretions  Neck: Supple, full ROM, no stridor, no meningeal signs  Respiratory: Lungs with crackles at both bases bilaterally. Not in respiratory distress  Cardiovascular:  Regular rate. Regular rhythm. No murmurs, no aortic murmurs, no gallops, no rubs. 2+ distal pulses. Equal extremity pulses. GI:  Abdomen Soft, Non tender, Non distended. No rebound, guarding, or rigidity. No pulsatile masses. Musculoskeletal: Moves all extremities x 4. Warm and well perfused,  no clubbing, no cyanosis, no edema of her arms or legs. There is no hand edema. No palpable cords. No calf tenderness.  All compartments are soft and compressible. No signs of compartment syndrome. 2+  Palpable peripheral pulses  Integument: skin warm and dry. No rashes. Neurologic: GCS 15, no focal deficits  Psychiatric: Normal Affect      I directly supervised any procedures performed by the resident and was present for the procedure including all critical portions of the procedure      The cardiac monitor revealed sinus rhythm with a heart rate in the 80 s as interpreted by me. The cardiac monitor was ordered secondary to the patient's shortness of breath and to monitor the patient for dysrhythmia. CPT [id number]      I, Dr. Adriana Sorensen, am the primary provider of record    My Medical Decision Making:         covid 19 pneumonia  Hemodynamically stable  Vitals reassuring  Not hypoxemic  Not in distress  Appropriate for outpatient therapy        1. Pneumonia due to COVID-19 virus    2.  [facility] MD Tone  02/23/21 8261

## 2021-02-23 NOTE — ED PROVIDER NOTES
HPI:     Tanvi Pearl is a 27 y.o. female presenting to the ED for shortness of breath, beginning 2 AM.  The complaint has been persistent, moderate in severity, and worsened by nothing. Patient states that she tested positive for Covid a few days ago but then subsequently tested negative yesterday so she does not think she has Covid. States has been having shortness of breath approximately 2 AM last night. Patient states that she did recently break her ankle and was seen here on the 21st for evaluation. States that she felt that her right hand was swollen and she got concerned to the ER for further management. At this time she is endorsing shortness of breath but denies any other symptoms including headache, dizziness, fever, chest pain, cough, nausea, vomiting, abdominal pain, diarrhea, constipation, urinary symptoms. Review of Systems:   Please see HPI above. All bolded are positive. All un-bolded are negative.     Constitutional Symptoms: fever, chills, fatigue, generalized weakness, diaphoresis, increase in thirst, loss of appetite  Eyes: vision change   Ears, Nose, Mouth, Throat: hearing loss, nasal congestion, sores in the mouth  Cardiovascular: chest pain, chest heaviness, palpitations  Respiratory: shortness of breath, wheezing, coughing  Gastrointestinal: abdominal pain, nausea, vomiting, diarrhea, constipation, melena, hematochezia, hematemesis  Genitourinary: dysuria, hematuria, increased frequency  Musculoskeletal: lower extremity edema, myalgias, arthralgias, back pain, right arm swelling  Integumentary: rashes, itching   Neurological: headache, lightheadedness, dizziness, confusion, syncope, numbness, tingling, focal weakness  Psychiatric: depression, suicidal ideation, anxiety  Endocrine: unintentional weight change  Hematologic/Lymphatic: lymphadenopathy, easy bruising, easy bleeding   Allergic/Immunologic: recurrent infections            --------------------------------------------- PAST HISTORY ---------------------------------------------  Past Medical History:  has a past medical history of Herpes genitalia, Paranoid schizophrenia ([facility] [address] [facility] and Ulcer. Past Surgical History:  has no past surgical history on file. Social History:  reports that she has been smoking cigarettes. She has been smoking about 0.25 packs per day. She has never used smokeless tobacco. She reports current alcohol use. She reports that she does not use drugs. Family History: family history includes Diabetes in her father and mother; Other in her brother. The patients home medications have been reviewed.     Allergies: Branda Mercy flavor, Strawberry extract, and Strawberry flavor    -------------------------------------------------- RESULTS -------------------------------------------------  All laboratory and radiology results have been personally reviewed by myself   LABS:  Results for orders placed or performed during the hospital encounter of 02/23/21   COVID-19, Rapid    Specimen: Nasopharyngeal Swab   Result Value Ref Range    SARS-CoV-2, NAAT DETECTED (A) Not Detected   CBC auto differential   Result Value Ref Range    WBC 3.0 (L) 4.5 - 11.5 E9/L    RBC 4.70 3.50 - 5.50 E12/L    Hemoglobin 13.6 11.5 - 15.5 g/dL    Hematocrit 40.8 34.0 - 48.0 %    MCV 86.8 80.0 - 99.9 fL    MCH 28.9 26.0 - 35.0 pg    MCHC 33.3 32.0 - 34.5 %    RDW 13.1 11.5 - 15.0 fL    Platelets 675 114 - 180 E9/L    MPV 11.4 7.0 - 12.0 fL    Neutrophils % 51.2 43.0 - 80.0 %    Immature Granulocytes % 0.3 0.0 - 5.0 %    Lymphocytes % 37.9 20.0 - 42.0 %    Monocytes % 9.6 2.0 - 12.0 %    Eosinophils % 0.7 0.0 - 6.0 %    Basophils % 0.3 0.0 - 2.0 %    Neutrophils Absolute 1.54 (L) 1.80 - 7.30 E9/L    Immature Granulocytes # 0.01 E9/L    Lymphocytes Absolute 1.14 (L) 1.50 - 4.00 E9/L    Monocytes Absolute 0.29 0.10 - 0.95 E9/L    Eosinophils Absolute 0.02 (L) 0.05 - 0.50 E9/L    Basophils Absolute 0.01 0.00 - 0.20 E9/L Comprehensive Metabolic Panel   Result Value Ref Range    Sodium 136 132 - 146 mmol/L    Potassium 3.6 3.5 - 5.0 mmol/L    Chloride 100 98 - 107 mmol/L    CO2 25 22 - 29 mmol/L    Anion Gap 11 7 - 16 mmol/L    Glucose 93 74 - 99 mg/dL    BUN 17 6 - 20 mg/dL    CREATININE 0.6 0.5 - 1.0 mg/dL    GFR Non-African American >60 >=60 mL/min/1.73    GFR African American >60     Calcium 8.9 8.6 - 10.2 mg/dL    Total Protein 7.6 6.4 - 8.3 g/dL    Albumin 4.2 3.5 - 5.2 g/dL    Total Bilirubin 0.3 0.0 - 1.2 mg/dL    Alkaline Phosphatase 59 35 - 104 U/L    ALT 32 0 - 32 U/L    AST 28 0 - 31 U/L   Magnesium   Result Value Ref Range    Magnesium 1.9 1.6 - 2.6 mg/dL   Troponin   Result Value Ref Range    Troponin <0.01 0.00 - 0.03 ng/mL   Brain Natriuretic Peptide   Result Value Ref Range    Pro-BNP 12 0 - 125 pg/mL   D-Dimer, Quantitative   Result Value Ref Range    D-Dimer, Quant 307 ng/mL DDU   Urine Drug Screen   Result Value Ref Range    Amphetamine Screen, Urine NOT DETECTED Negative <1000 ng/mL    Barbiturate Screen, Ur NOT DETECTED Negative < 200 ng/mL    Benzodiazepine Screen, Urine NOT DETECTED Negative < 200 ng/mL    Cannabinoid Scrn, Ur NOT DETECTED Negative < 50ng/mL    Cocaine Metabolite Screen, Urine NOT DETECTED Negative < 300 ng/mL    Opiate Scrn, Ur NOT DETECTED Negative < 300ng/mL    PCP Screen, Urine NOT DETECTED Negative < 25 ng/mL    Methadone Screen, Urine NOT DETECTED Negative <300 ng/mL    Oxycodone Urine NOT DETECTED Negative <100 ng/mL    FENTANYL SCREEN, URINE NOT DETECTED Negative <1 ng/mL    Drug Screen Comment: see below    Pregnancy, urine   Result Value Ref Range    HCG(Urine) Pregnancy Test NEGATIVE NEGATIVE   EKG 12 Lead   Result Value Ref Range    Ventricular Rate 74 BPM    Atrial Rate 74 BPM    P-R Interval 198 ms    QRS Duration 94 ms    Q-T Interval 396 ms    QTc Calculation (Bazett) 439 ms    P Axis 32 degrees    R Axis 23 degrees    T Axis 9 degrees       RADIOLOGY:  Interpreted by Radiologist.  CTA PULMONARY W CONTRAST   Final Result   1. There is no evidence of a pulmonary embolus. 2. Right lower lobe and to lesser extent left lower lobe ground-glass   infiltrates and more consolidative infiltrate within the medial aspect of the   right lower lobe inferiorly. These findings represent pneumonia. COVID   pneumonia cannot be excluded. XR CHEST PORTABLE   Final Result   No acute process. ------------------------- NURSING NOTES AND VITALS REVIEWED ---------------------------   The nursing notes within the ED encounter and vital signs as below have been reviewed. BP (!) 128/103   Pulse 88   Temp 97.5 °F (36.4 °C)   Resp 18   Ht 5' 3\" (1.6 m)   Wt 220 lb (99.8 kg)   LMP 02/20/2021   SpO2 99%   BMI 38.97 kg/m²   Oxygen Saturation Interpretation: Normal      ---------------------------------------------------PHYSICAL EXAM--------------------------------------      Constitutional/General: Alert and oriented x3, well appearing, non toxic in NAD  Head: Normocephalic and atraumatic  Eyes: PERRL, EOMI  Mouth: Oropharynx clear, handling secretions, no trismus  Neck: Supple, full ROM, phonation normal  Pulmonary: Lungs clear to auscultation bilaterally, no wheezes, rales, or rhonchi. Not in respiratory distress  Cardiovascular:  Regular rate and rhythm, no murmurs, gallops, or rubs. 2+ distal pulses  Abdomen: Soft, non tender, non distended,   Extremities: Moves all extremities x 4. Warm and well perfused, I appreciate no swelling at all to patient's right arm, patient does have Ace wrap around her left lower extremity. Skin: warm and dry without rash  Neurologic: GCS 15, no focal deficit noted  Psych: Normal Affect        EKG: This EKG is signed and interpreted by me.     Rate: 74  Rhythm: Sinus  Interpretation: Normal sinus rhythm, no acute changes noted, no axis deviation,  ms  Comparison: stable as compared to patient's most recent EKG    ------------------------------ ED COURSE/MEDICAL DECISION MAKING----------------------  Medications   iopamidol (ISOVUE-370) 76 % injection 60 mL (60 mLs Intravenous Given 2/23/21 1325)   sodium chloride flush 0.9 % injection 10 mL (10 mLs Intravenous Given 2/23/21 1325)         ED COURSE:       Medical Decision Making:    Presented to the ER today with chief complaint of shortness of breath. Patient was positive for COVID-19. Labs otherwise do demonstrate elevated D-dimer but otherwise are negative. CT is negative. Patient feeling better at this time. Patient was complaining of right-sided hand swelling which had not appreciated any time. Patient herself says that has resolved at this time. Patient did tell me that she had a fracture in her left lower extremity, however it is only Ace wrap and when I look at the imaging that was done I appreciate no fracture. Patient feeling better at this time. States she feels well enough to leave. I am agreeable to this plan. Patient discharged home instructions to follow with PCP return to the ER if symptoms return or worsen. Counseling: The emergency provider has spoken with the patient and discussed todays results, in addition to providing specific details for the plan of care and counseling regarding the diagnosis and prognosis. Questions are answered at this time and they are agreeable with the plan.      --------------------------------- IMPRESSION AND DISPOSITION ---------------------------------    IMPRESSION  1. COVID-19        New Prescriptions    No medications on file       DISPOSITION  Disposition: Discharge to home  Patient condition is stable      NOTE: This report was transcribed using voice recognition software.  Every effort was made to ensure accuracy; however, inadvertent computerized transcription errors may be present       Fabio DO Marie Mcelroy  02/23/21 0308

## 2021-02-24 ENCOUNTER — CARE COORDINATION (OUTPATIENT)
Dept: CARE COORDINATION | Age: 31
End: 2021-02-24

## 2021-02-24 RX ORDER — BUPRENORPHINE AND NALOXONE 8; 2 MG/1; MG/1
1 FILM, SOLUBLE BUCCAL; SUBLINGUAL 2 TIMES DAILY
COMMUNITY
Start: 2021-02-16

## 2021-02-24 RX ORDER — CHLORHEXIDINE GLUCONATE 0.12 MG/ML
15 RINSE ORAL 2 TIMES DAILY
COMMUNITY
Start: 2021-01-26 | End: 2022-04-18

## 2021-02-24 RX ORDER — POLYETHYLENE GLYCOL 3350 17 G/17G
17 POWDER, FOR SOLUTION ORAL DAILY
COMMUNITY
Start: 2021-02-08 | End: 2022-04-18

## 2021-02-24 SDOH — SOCIAL STABILITY: SOCIAL NETWORK: HOW OFTEN DO YOU ATTEND CHURCH OR RELIGIOUS SERVICES?: MORE THAN 4 TIMES PER YEAR

## 2021-02-24 SDOH — ECONOMIC STABILITY: TRANSPORTATION INSECURITY
IN THE PAST 12 MONTHS, HAS THE LACK OF TRANSPORTATION KEPT YOU FROM MEDICAL APPOINTMENTS OR FROM GETTING MEDICATIONS?: NO

## 2021-02-24 SDOH — HEALTH STABILITY: PHYSICAL HEALTH: ON AVERAGE, HOW MANY DAYS PER WEEK DO YOU ENGAGE IN MODERATE TO STRENUOUS EXERCISE (LIKE A BRISK WALK)?: 0 DAYS

## 2021-02-24 SDOH — SOCIAL STABILITY: SOCIAL NETWORK: ARE YOU MARRIED, WIDOWED, DIVORCED, SEPARATED, NEVER MARRIED, OR LIVING WITH A PARTNER?: NEVER MARRIED

## 2021-02-24 SDOH — ECONOMIC STABILITY: TRANSPORTATION INSECURITY
IN THE PAST 12 MONTHS, HAS LACK OF TRANSPORTATION KEPT YOU FROM MEETINGS, WORK, OR FROM GETTING THINGS NEEDED FOR DAILY LIVING?: NO

## 2021-02-24 SDOH — ECONOMIC STABILITY: INCOME INSECURITY: HOW HARD IS IT FOR YOU TO PAY FOR THE VERY BASICS LIKE FOOD, HOUSING, MEDICAL CARE, AND HEATING?: NOT HARD AT ALL

## 2021-02-24 SDOH — SOCIAL STABILITY: SOCIAL NETWORK
DO YOU BELONG TO ANY CLUBS OR ORGANIZATIONS SUCH AS CHURCH GROUPS UNIONS, FRATERNAL OR ATHLETIC GROUPS, OR SCHOOL GROUPS?: NO

## 2021-02-24 SDOH — SOCIAL STABILITY: SOCIAL NETWORK: HOW OFTEN DO YOU GET TOGETHER WITH FRIENDS OR RELATIVES?: ONCE A WEEK

## 2021-02-24 SDOH — HEALTH STABILITY: MENTAL HEALTH
STRESS IS WHEN SOMEONE FEELS TENSE, NERVOUS, ANXIOUS, OR CAN'T SLEEP AT NIGHT BECAUSE THEIR MIND IS TROUBLED. HOW STRESSED ARE YOU?: VERY MUCH

## 2021-02-24 SDOH — HEALTH STABILITY: PHYSICAL HEALTH: ON AVERAGE, HOW MANY MINUTES DO YOU ENGAGE IN EXERCISE AT THIS LEVEL?: 0 MIN

## 2021-02-24 NOTE — LETTER
2/24/2021    [address] [place] [address]    Dear [address] [place] [address],    I enjoyed speaking with you and wanted to send some additional information. Dr.Kelly Lin, APRN - CNP and I will work together to ensure your needs are met and help you achieve your health goals. We are committed to walk with you on this journey and look forward to working with you. Please feel free to contact me with any questions or concerns. I am available by phone at [phone].     In good health,     Serena Webb, [address]

## 2021-02-24 NOTE — CARE COORDINATION
taking: Reported on 2/24/2021 2/26/20 7/14/20  Inocencia Nicholson MD       Future Appointments   Date Time Provider Prasanna Ramirezi   3/2/2021  1:15 PM AKHIL Holland - CNP [facility]   3/11/2021 10:30 AM Yessica Wilson DO [facility]   3/22/2021  8:30 AM [facility] US RM 1 SEYZ US [facility]      and   General Assessment    Do you have any symptoms that are causing concern?: No

## 2021-02-24 NOTE — CARE COORDINATION
Patient contacted regarding SOCVA-86 diagnosis\". Discussed COVID-19 related testing which was available at this time. Test results were positive. Patient informed of results, if available? Yes    Care Transition Nurse/ Ambulatory Care Manager contacted the patient by telephone to perform post discharge assessment. Call within 2 business days of discharge: Yes. Verified name and  with patient as identifiers. Provided introduction to self, and explanation of the CTN/ACM role, and reason for call due to risk factors for infection and/or exposure to COVID-19. Symptoms reviewed with patient who verbalized the following symptoms: fatigue, no new symptoms and no worsening symptoms. Due to no new or worsening symptoms encounter was not routed to provider for escalation. Discussed follow-up appointments. If no appointment was previously scheduled, appointment scheduling offered: Yes  [facility] Jaiden Arredondo follow up appointment(s):   Future Appointments   Date Time Provider Prasanna Tabares   3/22/2021  8:30  [facility]      Non-[facility] follow up appointment(s): N/A    Non-face-to-face services provided:  Obtained and reviewed discharge summary and/or continuity of care documents , ACM re-reviewed education on self-isolation, increasing fluid intake, taking Tylenol or Ibuprofen for any fevers, headaches, or body aches, and s/sx of a worsening condition. ACM also informed pt that the [facility] office has walk-in flu clinic hours and gave pt the hours of operation. ACM assisted pt with making an ER f/u appt with PCP. Pt is scheduled for 3/2 @1:15pm in the office. Advance Care Planning:   Does patient have an Advance Directive:  reviewed and current. This was reviewed with pt in ED. Patient has following risk factors of: asthma.  CTN/ACM reviewed discharge instructions, medical action plan and red flags such as increased shortness of breath, increasing fever and signs of decompensation with patient who verbalized understanding. Discussed exposure protocols and quarantine with CDC Guidelines What to do if you are sick with coronavirus disease 2019.  Patient was given an opportunity for questions and concerns. The patient agrees to contact the [facility] exposure line [phone], [address] Department of Health: ([phone]) and PCP office for questions related to their healthcare. CTN/ACM provided contact information for future needs. Reviewed and educated patient on any new and changed medications related to discharge diagnosis, no medications prescribed in ED. Patient/family/caregiver given information for [employer] and agrees to enroll yes  Patient's preferred e-mail: pt currently enrolled 2/22   Patient's preferred phone number: [phone]  Based on Loop alert triggers, patient will be contacted by nurse care manager for worsening symptoms. ACM discussed the care coordination program again and pt agreed to enrollment.

## 2021-02-24 NOTE — TELEPHONE ENCOUNTER
Call placed to patient. Appointment scheduled at this time. Advised patient needs to be asymptomatic, verbalized understanding. Directions provided to office. Encouraged to call with further questions or concerns.   Future Appointments   Date Time Provider Prasanna Tabares   3/11/2021 10:30 AM Beth Danielson DO [facility]   3/22/2021  8:30 AM [address] [place] [address]

## 2021-03-03 ENCOUNTER — CARE COORDINATION (OUTPATIENT)
Dept: CARE COORDINATION | Age: 31
End: 2021-03-03

## 2021-03-03 NOTE — CARE COORDINATION
Ambulatory Care Coordination Note  3/3/2021  CM Risk Score: 5  Charlson 10 Year Mortality Risk Score: 2%     ACC: Robe Roman, RN    Summary Note:   -ACM called and spoke with Mickey Castro for asthma and depression f/u, check progress, assess needs  -Medications reviewed and she reports taking them as prescribed  -ACM noted [place]CP [facility] was [place] [facility]  [address] reminded Mickey Castro of her appt with Dr Marielle Carlisle on 3/11 @11am, pt plans on attending  -Mickey Medicine reports that she has to go to court on 3/9 for a traffic stop  -Mickey Medicine helps to care for her grandmother and reports that she got her grandmother tested for COVID as her grandmother had been exposed to her and was asking about her results as she was tested on 2/24 and she hasn't heard anything yet ACM informed pt that typically if no call, pt is negative. ACM confirmed that pt had a negative test result.   -Reports that she has an appt on 3/10 @10am at [facility] with her counselor and then she will see the doctor as well for her Suboxone  -Reports that she will be assigned a new counselor at [facility] and is waiting for a call from them, reports that she had her last counseling session last week    *Asthma  -Reports stable breathing, denies any increased SOB, coughing, fever, or chest discomfort at this time    *Plan  -Care coordination  -Mickey Castro to attend appt with Dr Marielle Carlisle on 3/11  -ACM will f/u in about 1 week to check progress and assess needs        Care Coordination Interventions    Program Enrollment: Complex Care  Referral from Primary Care Provider: No  Suggested Interventions and Community Resources  Fall Risk Prevention: Completed (Comment: 2/24/2021-discussed)  Grief Counselor: Completed (Comment: 2/24/2021-pt currently under the care of [facility] counseling)  Other Services: Completed (Comment: 2/24/2021-Pt currently being seen at [facility] rehab-recevies Suboxone)  Other Services or Interventions: 2/24/2021-SNAP benefits         Goals Addressed None         Prior to Admission medications    Medication Sig Start Date End Date Taking? Authorizing Provider   buprenorphine-naloxone (SUBOXONE) 8-2 MG FILM SL film Place 1 Film under the tongue 2 times daily. 2/16/21   Historical Provider, MD   chlorhexidine (PERIDEX) 0.12 % solution Take 15 mLs by mouth 2 times daily 1/26/21   Historical Provider, MD   polyethylene glycol (GLYCOLAX) 17 GM/SCOOP powder Take 17 g by mouth daily 2/8/21   Historical Provider, MD   ibuprofen (IBU) 600 MG tablet Take 1 tablet by mouth every 6 hours as needed for Pain 2/19/21 2/26/21  AKHIL Hamilton CNP   triamcinolone (KENALOG) 0.1 % cream Apply topically daily, as needed. 2/15/21   AKHIL Chisholm CNP   docosanol (ABREVA) 10 % CREA cream Small amount to lip several times a day during herpes breakout  Patient not taking: Reported on 2/24/2021 2/15/21   AKHIL Chisholm CNP   hydroCHLOROthiazide (MICROZIDE) 12.5 MG capsule Take 1 capsule by mouth every morning 2/8/21   AKHIL Chisholm CNP   famotidine (PEPCID AC MAXIMUM STRENGTH) 20 MG tablet Take 1 tablet by mouth 2 times daily 2/8/21   AKHIL Chisholm CNP   mometasone-formoterol [facility]) 200-5 MCG/ACT inhaler Inhale 2 puffs into the lungs every 12 hours 2/8/21   AKHIL Chisholm CNP   albuterol sulfate  (90 Base) MCG/ACT inhaler Inhale 2 puffs into the lungs every 6 hours as needed for Wheezing 2/8/21   AKHIL Chisholm CNP   VRAYLAR 3 MG CAPS capsule TAKE 1 CAPSULE BY MOUTH EVERY DAY 8/10/20   Historical Provider, MD   BANOPHEN 25 MG capsule TAKE ONE CAPSULE BY MOUTH AS NEEDED FOR ALLERGIES.  MAY CAUSE DROWSINESS 5/21/20   Historical Provider, MD   ALLERGY RELIEF 10 MG tablet TAKE ONE TABLET BY MOUTH EVERY DAY 5/27/20   Historical Provider, MD   acetaminophen (TYLENOL) 500 MG tablet Take 500 mg by mouth every 6 hours as needed     Historical Provider, MD   EPINEPHrine (EPIPEN 2-ANGELINA) 0.3 MG/0.3ML SOAJ injection Inject 0.3 mLs into the muscle once for 1 dose Use as directed for allergic reaction  Patient not taking: Reported on 2/24/2021 2/26/20 7/14/20  Sophie Manzano MD       Future Appointments   Date Time Provider Prasanna Tabares   3/11/2021 10:30 AM DO SE [facility] Bowman [facility]   3/15/2021 11:00 AM AKHIL Mccollum - CNP [facility]   3/22/2021  8:30 AM [facility]  RM 1 SEYZ [facility] Radiolo      and   General Assessment

## 2021-03-03 NOTE — CARE COORDINATION
Patient contacted regarding COVID-19 risk and screening. Discussed COVID-19 related testing which was available at this time. Test results were positive. Patient informed of results, if available? Yes. Ambulatory Care Manager contacted the patient by telephone to perform follow-up assessment. Verified name and  with patient as identifiers. Patient has following risk factors of: asthma. Symptoms reviewed with patient who verbalized the following symptoms: no new symptoms and no worsening symptoms. Was patient discharged with a pulse oximeter? No Discussed and confirmed pulse oximeter discharge instructions and when to notify provider or seek emergency care. Due to no new or worsening symptoms encounter was not routed to provider for escalation. Education provided regarding infection prevention, and signs and symptoms of COVID-19 and when to seek medical attention with patient who verbalized understanding. Discussed exposure protocols and quarantine from [address] you at higher risk for severe illness  and given an opportunity for questions and concerns. The patient agrees to contact the COVID-19 hotline [phone] or PCP office for questions related to their healthcare. ACM provided contact information for future reference. From CDC: Are you at higher risk for severe illness?  Wash your hands often.  Avoid close contact (6 feet, which is about two arm lengths) with people who are sick.  Put distance between yourself and other people if COVID-19 is spreading in your community.  Clean and disinfect frequently touched surfaces.  Avoid all cruise travel and non-essential air travel.  Call your healthcare professional if you have concerns about COVID-19 and your underlying condition or if you are sick.     For more information on steps you can take to protect yourself, see CDC's How to [address] [place] [address] for follow-up call in 5-7 days based on severity of symptoms and risk factors.

## 2021-03-05 DIAGNOSIS — M25.572 CHRONIC PAIN OF LEFT ANKLE: Primary | ICD-10-CM

## 2021-03-05 DIAGNOSIS — G89.29 CHRONIC PAIN OF LEFT ANKLE: Primary | ICD-10-CM

## 2021-03-10 ENCOUNTER — CARE COORDINATION (OUTPATIENT)
Dept: CARE COORDINATION | Age: 31
End: 2021-03-10

## 2021-03-10 NOTE — CARE COORDINATION
ACM left message for patient to return call to [phone]. Re: Follow up: asthma, review medications, remind pt of appt with Dr Meghann Esquivel on 3/11 at 10:0am, and appt with Binu De Oliveira NP 3/15 at 11am, check to see how grief counseling is going with new counselor, and how her [facility] therapy and rehab is going, Review POC, Education, Goals. Informed pt that Mayela Squires, , will likely contact her next week while ACM is out of the office. Contact information given.

## 2021-03-11 ENCOUNTER — OFFICE VISIT (OUTPATIENT)
Dept: ORTHOPEDIC SURGERY | Age: 31
End: 2021-03-11
Payer: COMMERCIAL

## 2021-03-11 ENCOUNTER — HOSPITAL ENCOUNTER (OUTPATIENT)
Dept: GENERAL RADIOLOGY | Age: 31
Discharge: HOME | End: 2021-03-13
Payer: COMMERCIAL

## 2021-03-11 VITALS — BODY MASS INDEX: 38.98 KG/M2 | HEIGHT: 63 IN | WEIGHT: 220 LBS

## 2021-03-11 DIAGNOSIS — M25.572 CHRONIC PAIN OF LEFT ANKLE: ICD-10-CM

## 2021-03-11 DIAGNOSIS — M25.572 CHRONIC PAIN OF LEFT ANKLE: Primary | ICD-10-CM

## 2021-03-11 DIAGNOSIS — G89.29 CHRONIC PAIN OF LEFT ANKLE: ICD-10-CM

## 2021-03-11 DIAGNOSIS — G89.29 CHRONIC PAIN OF LEFT ANKLE: Primary | ICD-10-CM

## 2021-03-11 PROCEDURE — G8427 DOCREV CUR MEDS BY ELIG CLIN: HCPCS | Performed by: PHYSICIAN ASSISTANT

## 2021-03-11 PROCEDURE — 99203 OFFICE O/P NEW LOW 30 MIN: CPT | Performed by: PHYSICIAN ASSISTANT

## 2021-03-11 PROCEDURE — 1036F TOBACCO NON-USER: CPT | Performed by: PHYSICIAN ASSISTANT

## 2021-03-11 PROCEDURE — 73610 X-RAY EXAM OF ANKLE: CPT

## 2021-03-11 PROCEDURE — 99202 OFFICE O/P NEW SF 15 MIN: CPT

## 2021-03-11 PROCEDURE — G8417 CALC BMI ABV UP PARAM F/U: HCPCS | Performed by: PHYSICIAN ASSISTANT

## 2021-03-11 PROCEDURE — G8484 FLU IMMUNIZE NO ADMIN: HCPCS | Performed by: PHYSICIAN ASSISTANT

## 2021-03-11 NOTE — LETTER
[address]  [address]  [place] [address]  Phone: [phone]  Fax: [phone]    Davion Debbie      March 11, 2021     Patient: Edvin Plunkett   YOB: 1990   Date of Visit: 3/11/2021       To Whom It May Concern: It is my medical opinion that Jen Blanco may return to full duty immediately with no restrictions. Please allow her to elevate her affected leg and apply ice or ace wrap as needed. If you have any questions or concerns, please don't hesitate to call.     Sincerely,        Ramona Og PA-C
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
## 2021-03-11 NOTE — PROGRESS NOTES
Patient here for an ED follow up on 02/19/2021 for a left ankle pain. Patient states that she was in the shower and she when was getting out of the tub she fell. Patient states that she did not noticed any pain at first, she was going to work and she noticed bruising. Patient states that she does not have any pain. Patient has crutches but does not fully use them.                Electronically signed by Rosa Henley MA on 3/11/2021 at 11:03 AM

## 2021-03-17 ENCOUNTER — CARE COORDINATION (OUTPATIENT)
Dept: CARE COORDINATION | Age: 31
End: 2021-03-17
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
## 2021-03-24 ENCOUNTER — CARE COORDINATION (OUTPATIENT)
Dept: CARE COORDINATION | Age: 31
End: 2021-03-24

## 2021-03-24 NOTE — CARE COORDINATION
ACM unable to leave a VM for patient as messages states the VM box is full. Re: Follow up: asthma, depression, remind pt of PCP appt on 3/29 at 1030, check to see if pt has met with newly assigned counselor at [facility], check status of how treatment is going at [facility], check to see how L ankle is doing Review POC, Education, Goals. Will try again another time.

## 2021-03-29 ENCOUNTER — OFFICE VISIT (OUTPATIENT)
Dept: FAMILY MEDICINE CLINIC | Age: 31
End: 2021-03-29
Payer: COMMERCIAL

## 2021-03-29 VITALS
SYSTOLIC BLOOD PRESSURE: 122 MMHG | DIASTOLIC BLOOD PRESSURE: 78 MMHG | WEIGHT: 212 LBS | TEMPERATURE: 97 F | HEIGHT: 63 IN | OXYGEN SATURATION: 98 % | BODY MASS INDEX: 37.56 KG/M2 | RESPIRATION RATE: 18 BRPM | HEART RATE: 83 BPM

## 2021-03-29 DIAGNOSIS — I10 HYPERTENSION, UNSPECIFIED TYPE: ICD-10-CM

## 2021-03-29 DIAGNOSIS — F32.A DEPRESSION, UNSPECIFIED DEPRESSION TYPE: Primary | ICD-10-CM

## 2021-03-29 DIAGNOSIS — Z87.891 PERSONAL HISTORY OF TOBACCO USE, PRESENTING HAZARDS TO HEALTH: ICD-10-CM

## 2021-03-29 DIAGNOSIS — R06.02 SOB (SHORTNESS OF BREATH): ICD-10-CM

## 2021-03-29 DIAGNOSIS — J45.909 MILD ASTHMA WITHOUT COMPLICATION, UNSPECIFIED WHETHER PERSISTENT: ICD-10-CM

## 2021-03-29 PROBLEM — T07.XXXA MULTIPLE TRAUMA: Status: RESOLVED | Noted: 2018-06-30 | Resolved: 2021-03-29

## 2021-03-29 PROBLEM — Z01.89: Status: RESOLVED | Noted: 2020-08-17 | Resolved: 2021-03-29

## 2021-03-29 PROBLEM — S06.0X1A CONCUSSION WITH LOSS OF CONSCIOUSNESS OF 30 MINUTES OR LESS: Status: RESOLVED | Noted: 2018-06-30 | Resolved: 2021-03-29

## 2021-03-29 PROCEDURE — 4004F PT TOBACCO SCREEN RCVD TLK: CPT | Performed by: NURSE PRACTITIONER

## 2021-03-29 PROCEDURE — 99214 OFFICE O/P EST MOD 30 MIN: CPT | Performed by: NURSE PRACTITIONER

## 2021-03-29 PROCEDURE — 99406 BEHAV CHNG SMOKING 3-10 MIN: CPT | Performed by: NURSE PRACTITIONER

## 2021-03-29 PROCEDURE — G8484 FLU IMMUNIZE NO ADMIN: HCPCS | Performed by: NURSE PRACTITIONER

## 2021-03-29 PROCEDURE — G8428 CUR MEDS NOT DOCUMENT: HCPCS | Performed by: NURSE PRACTITIONER

## 2021-03-29 PROCEDURE — G8417 CALC BMI ABV UP PARAM F/U: HCPCS | Performed by: NURSE PRACTITIONER

## 2021-03-29 RX ORDER — HYDROCHLOROTHIAZIDE 12.5 MG/1
12.5 CAPSULE, GELATIN COATED ORAL EVERY MORNING
Qty: 30 CAPSULE | Refills: 5 | Status: SHIPPED | OUTPATIENT
Start: 2021-03-29

## 2021-03-29 RX ORDER — ALBUTEROL SULFATE 90 UG/1
2 AEROSOL, METERED RESPIRATORY (INHALATION) EVERY 6 HOURS PRN
Qty: 1 INHALER | Refills: 3 | Status: SHIPPED | OUTPATIENT
Start: 2021-03-29

## 2021-03-29 ASSESSMENT — ENCOUNTER SYMPTOMS
WHEEZING: 0
SHORTNESS OF BREATH: 0
DIARRHEA: 0
CONSTIPATION: 0
NAUSEA: 0
EYE PAIN: 0
COUGH: 0
COLOR CHANGE: 0
VOMITING: 0
SINUS PAIN: 0
CHEST TIGHTNESS: 0
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
## 2021-03-29 NOTE — PATIENT INSTRUCTIONS
For babies and small children, living smoke-free means they're less likely to have ear infections, pneumonia, and bronchitis. · If you're a woman who is or will be pregnant someday, quitting smoking means a healthier . · Children who are close to you are less likely to become adult smokers. Where can you learn more? Go to https://chpebg.[employer]. org and sign in to your [employer] account. Enter [phone] in the [employer] box to learn more about \"Learning About Benefits From Quitting Smoking. \"     If you do not have an account, please click on the \"Sign Up Now\" link. Current as of: 2020               Content Version: 12.8   [employer]wise, [employer]. Care instructions adapted under license by [facility] ([facility]). If you have questions about a medical condition or this instruction, always ask your healthcare professional. [address] any warranty or liability for your use of this information.

## 2021-03-30 ENCOUNTER — CARE COORDINATION (OUTPATIENT)
Dept: CARE COORDINATION | Age: 31
End: 2021-03-30

## 2021-03-30 NOTE — CARE COORDINATION
ACM unable to leave VM for patient as message states that VM box is full. Re: Follow up: asthma/COPD, check status of grief counseling, [facility]/counseling, review medications, Review POC, Education, Goals. ACM will try another time.

## 2021-04-06 ENCOUNTER — CARE COORDINATION (OUTPATIENT)
Dept: CARE COORDINATION | Age: 31
End: 2021-04-06

## 2021-04-06 NOTE — CARE COORDINATION
ACM attempted to contact pt for asthma f/u, check progress, assess needs, review medications, and inquire about counseling and [facility] sessions. Unable to leave a VM as message states that VM box is full, will try another time.

## 2021-04-13 ENCOUNTER — CARE COORDINATION (OUTPATIENT)
Dept: CARE COORDINATION | Age: 31
End: 2021-04-13

## 2021-04-13 NOTE — CARE COORDINATION
[facility] rehab-recevies Suboxone)  Other Services or Interventions: 2/24/2021-SNAP benefits         Goals Addressed    None         Prior to Admission medications    Medication Sig Start Date End Date Taking? Authorizing Provider   albuterol sulfate  (90 Base) MCG/ACT inhaler Inhale 2 puffs into the lungs every 6 hours as needed for Wheezing 3/29/21   AKHIL Arriaga CNP   hydroCHLOROthiazide (MICROZIDE) 12.5 MG capsule Take 1 capsule by mouth every morning 3/29/21   AKHIL Arriaga CNP   Misc. Devices MISC 1 each by Does not apply route once for 1 dose One set of crutches. 3/11/21 3/11/21  Michelle Dyson PA-C   buprenorphine-naloxone (SUBOXONE) 8-2 MG FILM SL film Place 1 Film under the tongue 2 times daily. 2/16/21   Historical Provider, MD   chlorhexidine (PERIDEX) 0.12 % solution Take 15 mLs by mouth 2 times daily 1/26/21   Historical Provider, MD   polyethylene glycol (GLYCOLAX) 17 GM/SCOOP powder Take 17 g by mouth daily 2/8/21   Historical Provider, MD   ibuprofen (IBU) 600 MG tablet Take 1 tablet by mouth every 6 hours as needed for Pain 2/19/21 3/3/21  AKHIL Mar CNP   triamcinolone (KENALOG) 0.1 % cream Apply topically daily, as needed. 2/15/21   AKHIL Arriaga CNP   docosanol (ABREVA) 10 % CREA cream Small amount to lip several times a day during herpes breakout 2/15/21   AKHIL Arriaga CNP   famotidine (PEPCID AC MAXIMUM STRENGTH) 20 MG tablet Take 1 tablet by mouth 2 times daily 2/8/21   AKHIL Arriaga CNP   mometasone-formoterol [facility]) 200-5 MCG/ACT inhaler Inhale 2 puffs into the lungs every 12 hours 2/8/21   AKHIL Arriaga CNP   VRAYLAR 3 MG CAPS capsule TAKE 1 CAPSULE BY MOUTH EVERY DAY 8/10/20   Historical Provider, MD   BANOPHEN 25 MG capsule TAKE ONE CAPSULE BY MOUTH AS NEEDED FOR ALLERGIES.  MAY CAUSE DROWSINESS 5/21/20   Historical Provider, MD   ALLERGY RELIEF 10 MG tablet TAKE ONE TABLET BY MOUTH EVERY DAY 5/27/20 Historical Provider, MD   acetaminophen (TYLENOL) 500 MG tablet Take 500 mg by mouth every 6 hours as needed     Historical Provider, MD   EPINEPHrine (EPIPEN 2-ANGELINA) 0.3 MG/0.3ML SOAJ injection Inject 0.3 mLs into the muscle once for 1 dose Use as directed for allergic reaction  Patient not taking: Reported on 2/24/2021 2/26/20 7/14/20  Patricia Vega MD       Future Appointments   Date Time Provider Prasanna Tabares   5/24/2021 11:30 AM AKHIL Beck - CNP [facility]      and   General Assessment

## 2021-04-19 ENCOUNTER — APPOINTMENT (OUTPATIENT)
Dept: GENERAL RADIOLOGY | Age: 31
End: 2021-04-19
Payer: COMMERCIAL

## 2021-04-19 ENCOUNTER — TELEPHONE (OUTPATIENT)
Dept: ADMINISTRATIVE | Age: 31
End: 2021-04-19

## 2021-04-19 ENCOUNTER — HOSPITAL ENCOUNTER (EMERGENCY)
Age: 31
Discharge: LEFT BEFORE DISCHARGE | End: 2021-04-19
Payer: COMMERCIAL

## 2021-04-19 VITALS
HEART RATE: 91 BPM | BODY MASS INDEX: 38.09 KG/M2 | HEIGHT: 63 IN | RESPIRATION RATE: 16 BRPM | OXYGEN SATURATION: 96 % | TEMPERATURE: 97.5 F | WEIGHT: 215 LBS

## 2021-04-19 VITALS
HEART RATE: 84 BPM | OXYGEN SATURATION: 95 % | TEMPERATURE: 97.3 F | SYSTOLIC BLOOD PRESSURE: 141 MMHG | RESPIRATION RATE: 17 BRPM | DIASTOLIC BLOOD PRESSURE: 109 MMHG

## 2021-04-19 DIAGNOSIS — M25.572 CHRONIC PAIN OF LEFT ANKLE: Primary | ICD-10-CM

## 2021-04-19 DIAGNOSIS — G89.29 CHRONIC PAIN OF LEFT ANKLE: Primary | ICD-10-CM

## 2021-04-19 ASSESSMENT — PAIN DESCRIPTION - DESCRIPTORS: DESCRIPTORS: CRAMPING;DULL;DISCOMFORT

## 2021-04-19 ASSESSMENT — PAIN DESCRIPTION - LOCATION: LOCATION: ANKLE

## 2021-04-19 ASSESSMENT — PAIN DESCRIPTION - PAIN TYPE: TYPE: ACUTE PAIN

## 2021-04-19 NOTE — ED NOTES
Unable to locate pt in ER waiting room, pt name called multiple times, bathrooms checked, will check again later     Fátima Hoang RN  04/19/21 [id number]

## 2021-04-19 NOTE — ED NOTES
Called pt to take to room in ER. No answer from waiting room. Pt possibly in [address] [place] [address].   Will attempt again shortly     Guera Burrell RN  04/19/21 [id number]

## 2021-04-20 NOTE — TELEPHONE ENCOUNTER
Call placed to patient. Advised of order. Patient agreeable to plan, will call once MRI is scheduled or completed for follow up in office. You were ordered MRI of left ankle by your Orthopedic Provider.   If you do not hear from that department please contact: MRI- [phone]

## 2021-04-29 ENCOUNTER — HOSPITAL ENCOUNTER (OUTPATIENT)
Dept: MRI IMAGING | Age: 31
Discharge: HOME | End: 2021-05-01
Payer: COMMERCIAL

## 2021-04-29 ENCOUNTER — TELEPHONE (OUTPATIENT)
Dept: ORTHOPEDIC SURGERY | Age: 31
End: 2021-04-29

## 2021-04-29 DIAGNOSIS — G89.29 CHRONIC PAIN OF LEFT ANKLE: ICD-10-CM

## 2021-04-29 DIAGNOSIS — M25.572 CHRONIC PAIN OF LEFT ANKLE: ICD-10-CM

## 2021-04-29 PROCEDURE — 73721 MRI JNT OF LWR EXTRE W/O DYE: CPT

## 2021-04-29 NOTE — TELEPHONE ENCOUNTER
Called patient to discuss L ankle MRI results. Will need to call patient to schedule for another office evaluation no XR in the next 1-2 weeks.

## 2021-05-03 NOTE — TELEPHONE ENCOUNTER
Already talked to patient regarding her MRI results on 4/29/21. Schedule for another office visit in 1-2 weeks.

## 2021-05-05 DIAGNOSIS — K21.9 GASTROESOPHAGEAL REFLUX DISEASE WITHOUT ESOPHAGITIS: ICD-10-CM

## 2021-05-05 DIAGNOSIS — J45.909 MILD ASTHMA WITHOUT COMPLICATION, UNSPECIFIED WHETHER PERSISTENT: ICD-10-CM

## 2021-05-06 RX ORDER — FAMOTIDINE 20 MG/1
20 TABLET, FILM COATED ORAL 2 TIMES DAILY
Qty: 60 TABLET | Refills: 5 | Status: SHIPPED | OUTPATIENT
Start: 2021-05-06

## 2021-05-20 ENCOUNTER — TELEPHONE (OUTPATIENT)
Dept: ADMINISTRATIVE | Age: 31
End: 2021-05-20

## 2021-06-18 ENCOUNTER — TELEPHONE (OUTPATIENT)
Dept: ORTHOPEDIC SURGERY | Age: 31
End: 2021-06-18

## 2021-06-18 NOTE — TELEPHONE ENCOUNTER
I reviewed her MRI results with her on 4/29/21. She has no-showed to the past 2 appt. Can schedule her in the next couple weeks again to re-evaluate her in the office and discuss treatment plan.

## 2021-06-18 NOTE — TELEPHONE ENCOUNTER
Patient missed appt on 6/17/21 to go over MRI results, Pre-Service scheduled for 7/15/21. Checking to see if that needs scheduled sooner.

## 2021-06-18 NOTE — TELEPHONE ENCOUNTER
Patient could have ordering provider call to go over results if she would like?  Otherwise I would move up her appointment, next week is unavailable could do Tuesday or Friday the week of 6/28  Electronically signed by Salinas Lynne PA-C on 6/18/2021 at 2:44 PM

## 2021-06-29 ENCOUNTER — OFFICE VISIT (OUTPATIENT)
Dept: ORTHOPEDIC SURGERY | Age: 31
End: 2021-06-29
Payer: COMMERCIAL

## 2021-06-29 VITALS — TEMPERATURE: 98.7 F

## 2021-06-29 DIAGNOSIS — S93.402D SPRAIN OF LEFT ANKLE, UNSPECIFIED LIGAMENT, SUBSEQUENT ENCOUNTER: ICD-10-CM

## 2021-06-29 DIAGNOSIS — G89.29 CHRONIC PAIN OF LEFT ANKLE: Primary | ICD-10-CM

## 2021-06-29 DIAGNOSIS — M25.572 CHRONIC PAIN OF LEFT ANKLE: Primary | ICD-10-CM

## 2021-06-29 PROCEDURE — 4004F PT TOBACCO SCREEN RCVD TLK: CPT | Performed by: ORTHOPAEDIC SURGERY

## 2021-06-29 PROCEDURE — G8427 DOCREV CUR MEDS BY ELIG CLIN: HCPCS | Performed by: ORTHOPAEDIC SURGERY

## 2021-06-29 PROCEDURE — 99213 OFFICE O/P EST LOW 20 MIN: CPT | Performed by: ORTHOPAEDIC SURGERY

## 2021-06-29 PROCEDURE — G8417 CALC BMI ABV UP PARAM F/U: HCPCS | Performed by: ORTHOPAEDIC SURGERY

## 2021-06-29 PROCEDURE — 99212 OFFICE O/P EST SF 10 MIN: CPT | Performed by: ORTHOPAEDIC SURGERY

## 2021-06-29 NOTE — PROGRESS NOTES
John Martínez is a 27 y.o. female who presents for follow up to review MRI results    left ankle  SURGEON: Dr. Tara Samuel, DO  Date of Injury/Surgery: 4-29-2  MRI done              Date last seen in office: 3-11-21    Symptoms: worse Since return to work 3 months ago. Is not working at this time. New complaints: Increase in  pain and edema left ankle       Weightbearing:  Full weight bearing      Assistive device No Device  Participating in therapy (location if yes)?  no    Refills Needed: None  Order/Referral Needed: no

## 2021-06-29 NOTE — PATIENT INSTRUCTIONS
Therapy order sent to [address] [place] [address] Physical Therapy  [address] [place] [address] Drive  [place], [address]  [phone]    Follow up as needed  Please call the office at [phone] or send Docebo message to providers sooner with any questions or concerns  Strongly recommend all of our patients sign up for MyChart in order to have direct communication [phone] MANUEL with our clinic staff.

## 2021-07-01 PROBLEM — S93.402A LEFT ANKLE SPRAIN: Status: ACTIVE | Noted: 2021-07-01
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
## 2021-11-29 ENCOUNTER — NURSE ONLY (OUTPATIENT)
Dept: PRIMARY CARE CLINIC | Age: 31
End: 2021-11-29

## 2021-11-29 DIAGNOSIS — Z20.822 EXPOSURE TO COVID-19 VIRUS: Primary | ICD-10-CM

## 2021-11-30 DIAGNOSIS — Z20.822 EXPOSURE TO COVID-19 VIRUS: ICD-10-CM

## 2021-12-01 LAB
SARS-COV-2: DETECTED
SOURCE: ABNORMAL

## 2021-12-10 ENCOUNTER — OFFICE VISIT (OUTPATIENT)
Dept: PRIMARY CARE CLINIC | Age: 31
End: 2021-12-10
Payer: COMMERCIAL

## 2021-12-10 VITALS
RESPIRATION RATE: 16 BRPM | BODY MASS INDEX: 38.09 KG/M2 | HEART RATE: 89 BPM | DIASTOLIC BLOOD PRESSURE: 75 MMHG | SYSTOLIC BLOOD PRESSURE: 112 MMHG | WEIGHT: 215 LBS | TEMPERATURE: 97.5 F | OXYGEN SATURATION: 98 % | HEIGHT: 63 IN

## 2021-12-10 DIAGNOSIS — Z86.16 HISTORY OF COVID-19: Primary | ICD-10-CM

## 2021-12-10 LAB
Lab: NORMAL
PERFORMING INSTRUMENT: NORMAL
QC PASS/FAIL: NORMAL
SARS-COV-2, POC: NORMAL

## 2021-12-10 PROCEDURE — G8484 FLU IMMUNIZE NO ADMIN: HCPCS | Performed by: NURSE PRACTITIONER

## 2021-12-10 PROCEDURE — 87426 SARSCOV CORONAVIRUS AG IA: CPT | Performed by: NURSE PRACTITIONER

## 2021-12-10 PROCEDURE — 4004F PT TOBACCO SCREEN RCVD TLK: CPT | Performed by: NURSE PRACTITIONER

## 2021-12-10 PROCEDURE — 99213 OFFICE O/P EST LOW 20 MIN: CPT | Performed by: NURSE PRACTITIONER

## 2021-12-10 PROCEDURE — G8417 CALC BMI ABV UP PARAM F/U: HCPCS | Performed by: NURSE PRACTITIONER

## 2021-12-10 PROCEDURE — G8427 DOCREV CUR MEDS BY ELIG CLIN: HCPCS | Performed by: NURSE PRACTITIONER

## 2021-12-10 NOTE — PATIENT INSTRUCTIONS
Patient Education        Learning About Coronavirus [phone]  What is coronavirus (COVID-19)? COVID-19 is a disease caused by a type of coronavirus. This illness was first found in December 2019. It has since spread worldwide. Coronaviruses are a large group of viruses. They cause the common cold. They also cause more serious illnesses like [place] respiratory syndrome (MERS) and severe acute respiratory syndrome (SARS). COVID-19 is caused by a novel coronavirus. That means it's a new type that has not been seen in people before. What are the symptoms? COVID-19 symptoms may include:  · Fever. · Cough. · Trouble breathing. · Chills or repeated shaking with chills. · Muscle and body aches. · Headache. · Sore throat. · New loss of taste or smell. · Vomiting. · Diarrhea. In severe cases, COVID-19 can cause pneumonia and make it hard to breathe without help from a machine. It can cause death. How is it diagnosed? COVID-19 is diagnosed with a viral test. This may also be called a PCR test or antigen test. It looks for evidence of the virus in your breathing passages or lungs (respiratory system). The test is most often done on a sample from the nose, throat, or lungs. It's sometimes done on a sample of saliva. One way a sample is collected is by putting a long swab into the back of your nose. How is it treated? Mild cases of COVID-19 can be treated at home. Serious cases need treatment in the hospital. Treatment may include medicines to reduce symptoms, plus breathing support such as oxygen therapy or a ventilator. Some people may be placed on their belly to help their oxygen levels. Treatments that may help people who have COVID-19 include:  Antiviral medicines. These medicines treat viral infections. Remdesivir is an example. Immune-based therapy. These medicines help the immune system fight COVID-19. Examples include monoclonal antibodies. Blood thinners.    These medicines help prevent blood clots. People with severe illness are at risk for blood clots. How can you protect yourself and others? The best way to protect yourself from getting sick is to:  · Get vaccinated. · Avoid sick people. · If you are not fully vaccinated:  ? Wear a mask if you have to go to public areas. ? Avoid crowds and try to stay at least 6 feet away from other people. · Cover your mouth with a tissue when you cough or sneeze. · Wash your hands often, especially after you cough or sneeze. Use soap and water, and scrub for at least 20 seconds. If soap and water aren't available, use an alcohol-based hand . · Avoid touching your mouth, nose, and eyes. To help avoid spreading the virus to others:  · Get vaccinated. · Cover your mouth with a tissue when you cough or sneeze. · Wash your hands often, especially after you cough or sneeze. Use soap and water, and scrub for at least 20 seconds. If soap and water aren't available, use an alcohol-based hand . · If you have been exposed to the virus and are not fully vaccinated:  ? Stay home. Don't go to school, work, or public areas. And don't use public transportation, ride-shares, or taxis unless you have no choice. ? Wear a mask if you have to go to public areas, like the pharmacy. · If you're sick:  ? Leave your home only if you need to get medical care. But call the doctor's office first so they know you're coming. And wear a mask. ? Wear a mask whenever you're around other people. ? Limit contact with pets and people in your home. If possible, stay in a separate bedroom and use a separate bathroom. ? Clean and disinfect your home every day. Use household  and disinfectant wipes or sprays. Take special care to clean things that you touch with your hands. How can you self-isolate when you have COVID-19? If you have COVID-19, there are things you can do to help avoid spreading the virus to others.   · Limit contact with people in your home. If possible, stay in a separate bedroom and use a separate bathroom. · Wear a mask when you are around other people. · If you have to leave home, avoid crowds and try to stay at least 6 feet away from other people. · Avoid contact with pets and other animals. · Cover your mouth and nose with a tissue when you cough or sneeze. Then throw it in the trash right away. · Wash your hands often, especially after you cough or sneeze. Use soap and water, and scrub for at least 20 seconds. If soap and water aren't available, use an alcohol-based hand . · Don't share personal household items. These include bedding, towels, cups and glasses, and eating utensils. · [address] in the warmest water allowed for the fabric type, and dry it completely. It's okay to wash other people's laundry with yours. · Clean and disinfect your home. Use household  and disinfectant wipes or sprays. When should you call for help? Call 911 anytime you think you may need emergency care. For example, call if you have life-threatening symptoms, such as:    · You have severe trouble breathing. (You can't talk at all.)     · You have constant chest pain or pressure.     · You are severely dizzy or lightheaded.     · You are confused or can't think clearly.     · You have pale, gray, or blue-colored skin or lips.     · You pass out (lose consciousness) or are very hard to wake up. Call your doctor now or seek immediate medical care if:    · You have moderate trouble breathing. (You can't speak a full sentence.)     · You are coughing up blood (more than about 1 teaspoon).     · You have signs of low blood pressure. These include feeling lightheaded; being too weak to stand; and having cold, pale, clammy skin.    Watch closely for changes in your health, and be sure to contact your doctor if:    · Your symptoms get worse.     · You are not getting better as expected.     · You have new or worse symptoms of anxiety, depression, nightmares, or flashbacks. Call before you go to the doctor's office. Follow their instructions. And wear a mask. Current as of: July 1, 2021               Content Version: 13.0  © 2006-2021 [employer]. Care instructions adapted under license by [facility] ([facility]). If you have questions about a medical condition or this instruction, always ask your healthcare professional. [place] [address] any warranty or liability for your use of this information.

## 2021-12-10 NOTE — LETTER
[address] [facility] [place]  [address] [place] [address]  Phone: [phone]  Fax: [phone]    AKHIL Kennedy CNP        December 10, 2021     Patient: Marina Salazar   YOB: 1990   Date of Visit: 12/10/2021       To Whom it May Concern:    Zuleyka Villalobos was seen in my clinic on 12/10/2021. She may return to work on 12/11/21. If you have any questions or concerns, please don't hesitate to call.     Sincerely,         AKHIL Kennedy CNP
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
## 2022-03-29 ENCOUNTER — HOSPITAL ENCOUNTER (INPATIENT)
Age: 32
LOS: 6 days | Discharge: HOME | DRG: 750 | End: 2022-04-05
Attending: EMERGENCY MEDICINE | Admitting: PSYCHIATRY & NEUROLOGY
Payer: COMMERCIAL

## 2022-03-29 DIAGNOSIS — F20.0 PARANOID SCHIZOPHRENIA (HCC): Primary | ICD-10-CM

## 2022-03-29 LAB
ACETAMINOPHEN LEVEL: <5 MCG/ML (ref 10–30)
ALBUMIN SERPL-MCNC: 5 G/DL (ref 3.5–5.2)
ALP BLD-CCNC: 57 U/L (ref 35–104)
ALT SERPL-CCNC: 32 U/L (ref 0–32)
ANION GAP SERPL CALCULATED.3IONS-SCNC: 15 MMOL/L (ref 7–16)
AST SERPL-CCNC: 25 U/L (ref 0–31)
BILIRUB SERPL-MCNC: 0.4 MG/DL (ref 0–1.2)
BUN BLDV-MCNC: 13 MG/DL (ref 6–20)
CALCIUM SERPL-MCNC: 9.7 MG/DL (ref 8.6–10.2)
CHLORIDE BLD-SCNC: 101 MMOL/L (ref 98–107)
CO2: 20 MMOL/L (ref 22–29)
CREAT SERPL-MCNC: 0.7 MG/DL (ref 0.5–1)
ETHANOL: <10 MG/DL (ref 0–0.08)
GFR AFRICAN AMERICAN: >60
GFR NON-AFRICAN AMERICAN: >60 ML/MIN/1.73
GLUCOSE BLD-MCNC: 115 MG/DL (ref 74–99)
POTASSIUM SERPL-SCNC: 3.7 MMOL/L (ref 3.5–5)
REASON FOR REJECTION: NORMAL
REJECTED TEST: NORMAL
SALICYLATE, SERUM: <0.3 MG/DL (ref 0–30)
SODIUM BLD-SCNC: 136 MMOL/L (ref 132–146)
TOTAL PROTEIN: 8.5 G/DL (ref 6.4–8.3)
TRICYCLIC ANTIDEPRESSANTS SCREEN SERUM: NEGATIVE NG/ML

## 2022-03-29 PROCEDURE — 80053 COMPREHEN METABOLIC PANEL: CPT

## 2022-03-29 PROCEDURE — 80179 DRUG ASSAY SALICYLATE: CPT

## 2022-03-29 PROCEDURE — 93005 ELECTROCARDIOGRAM TRACING: CPT | Performed by: NURSE PRACTITIONER

## 2022-03-29 PROCEDURE — 99284 EMERGENCY DEPT VISIT MOD MDM: CPT

## 2022-03-29 PROCEDURE — 96372 THER/PROPH/DIAG INJ SC/IM: CPT

## 2022-03-29 PROCEDURE — 80143 DRUG ASSAY ACETAMINOPHEN: CPT

## 2022-03-29 PROCEDURE — 80307 DRUG TEST PRSMV CHEM ANLYZR: CPT

## 2022-03-29 PROCEDURE — 81001 URINALYSIS AUTO W/SCOPE: CPT

## 2022-03-29 PROCEDURE — 80061 LIPID PANEL: CPT

## 2022-03-29 PROCEDURE — 82077 ASSAY SPEC XCP UR&BREATH IA: CPT

## 2022-03-29 NOTE — LETTER
[facility] [address]  Phone: [phone]          April 5, 2022     Patient: Prakash Mejia   YOB: 1990   Date of Visit: 3/29/2022       To Whom It May Concern:    Please be advised that Nidhi Irving was admitted to [facility] on 3/29/2022 and was discharged on 4/5/2022. Toya Toledo can return to work on 4/7/2022. If you have any questions or concerns, please don't hesitate to call.     Sincerely,        Howie Boyer., MSW, LSW

## 2022-03-30 PROBLEM — R44.3 HALLUCINATIONS: Status: ACTIVE | Noted: 2022-03-30

## 2022-03-30 PROBLEM — F12.10 CANNABIS ABUSE: Status: ACTIVE | Noted: 2022-03-30

## 2022-03-30 PROBLEM — F20.0 PARANOID SCHIZOPHRENIA (HCC): Status: ACTIVE | Noted: 2022-03-30

## 2022-03-30 LAB
AMPHETAMINE SCREEN, URINE: NOT DETECTED
ANISOCYTOSIS: ABNORMAL
BACTERIA: ABNORMAL /HPF
BARBITURATE SCREEN URINE: NOT DETECTED
BASOPHILS ABSOLUTE: 0 E9/L (ref 0–0.2)
BASOPHILS RELATIVE PERCENT: 0.4 % (ref 0–2)
BENZODIAZEPINE SCREEN, URINE: NOT DETECTED
BILIRUBIN URINE: NEGATIVE
BLOOD, URINE: ABNORMAL
CANNABINOID SCREEN URINE: POSITIVE
CLARITY: CLEAR
COCAINE METABOLITE SCREEN URINE: NOT DETECTED
COLOR: YELLOW
EKG ATRIAL RATE: 99 BPM
EKG P AXIS: 47 DEGREES
EKG P-R INTERVAL: 154 MS
EKG Q-T INTERVAL: 348 MS
EKG QRS DURATION: 80 MS
EKG QTC CALCULATION (BAZETT): 446 MS
EKG R AXIS: 49 DEGREES
EKG T AXIS: 37 DEGREES
EKG VENTRICULAR RATE: 99 BPM
EOSINOPHILS ABSOLUTE: 0 E9/L (ref 0.05–0.5)
EOSINOPHILS RELATIVE PERCENT: 0.2 % (ref 0–6)
FENTANYL SCREEN, URINE: NOT DETECTED
GLUCOSE URINE: NEGATIVE MG/DL
HCG, URINE, POC: NEGATIVE
HCT VFR BLD CALC: 40.6 % (ref 34–48)
HEMOGLOBIN: 13.9 G/DL (ref 11.5–15.5)
INFLUENZA A: NOT DETECTED
INFLUENZA B: NOT DETECTED
KETONES, URINE: >=80 MG/DL
LEUKOCYTE ESTERASE, URINE: ABNORMAL
LYMPHOCYTES ABSOLUTE: 0.66 E9/L (ref 1.5–4)
LYMPHOCYTES RELATIVE PERCENT: 7.8 % (ref 20–42)
Lab: ABNORMAL
Lab: NORMAL
MCH RBC QN AUTO: 30.2 PG (ref 26–35)
MCHC RBC AUTO-ENTMCNC: 34.2 % (ref 32–34.5)
MCV RBC AUTO: 88.3 FL (ref 80–99.9)
METHADONE SCREEN, URINE: NOT DETECTED
MONOCYTES ABSOLUTE: 0.16 E9/L (ref 0.1–0.95)
MONOCYTES RELATIVE PERCENT: 1.7 % (ref 2–12)
MUCUS: PRESENT /LPF
NEGATIVE QC PASS/FAIL: NORMAL
NEUTROPHILS ABSOLUTE: 7.46 E9/L (ref 1.8–7.3)
NEUTROPHILS RELATIVE PERCENT: 90.5 % (ref 43–80)
NITRITE, URINE: NEGATIVE
OPIATE SCREEN URINE: NOT DETECTED
OVALOCYTES: ABNORMAL
OXYCODONE URINE: NOT DETECTED
PDW BLD-RTO: 13.1 FL (ref 11.5–15)
PH UA: 6 (ref 5–9)
PHENCYCLIDINE SCREEN URINE: NOT DETECTED
PLATELET # BLD: 256 E9/L (ref 130–450)
PMV BLD AUTO: 10.4 FL (ref 7–12)
POIKILOCYTES: ABNORMAL
POLYCHROMASIA: ABNORMAL
POSITIVE QC PASS/FAIL: NORMAL
PROTEIN UA: NEGATIVE MG/DL
RBC # BLD: 4.6 E12/L (ref 3.5–5.5)
RBC UA: ABNORMAL /HPF (ref 0–2)
SARS-COV-2 RNA, RT PCR: NOT DETECTED
SPECIFIC GRAVITY UA: >=1.03 (ref 1–1.03)
UROBILINOGEN, URINE: 1 E.U./DL
WBC # BLD: 8.2 E9/L (ref 4.5–11.5)
WBC UA: ABNORMAL /HPF (ref 0–5)

## 2022-03-30 PROCEDURE — 1240000000 HC EMOTIONAL WELLNESS R&B

## 2022-03-30 PROCEDURE — 2580000003 HC RX 258: Performed by: NURSE PRACTITIONER

## 2022-03-30 PROCEDURE — 36415 COLL VENOUS BLD VENIPUNCTURE: CPT

## 2022-03-30 PROCEDURE — 99221 1ST HOSP IP/OBS SF/LOW 40: CPT | Performed by: NURSE PRACTITIONER

## 2022-03-30 PROCEDURE — 6360000002 HC RX W HCPCS: Performed by: NURSE PRACTITIONER

## 2022-03-30 PROCEDURE — 85025 COMPLETE CBC W/AUTO DIFF WBC: CPT

## 2022-03-30 PROCEDURE — 6370000000 HC RX 637 (ALT 250 FOR IP): Performed by: NURSE PRACTITIONER

## 2022-03-30 PROCEDURE — 93010 ELECTROCARDIOGRAM REPORT: CPT | Performed by: INTERNAL MEDICINE

## 2022-03-30 PROCEDURE — 83036 HEMOGLOBIN GLYCOSYLATED A1C: CPT

## 2022-03-30 PROCEDURE — 94640 AIRWAY INHALATION TREATMENT: CPT

## 2022-03-30 PROCEDURE — 6370000000 HC RX 637 (ALT 250 FOR IP): Performed by: PSYCHIATRY & NEUROLOGY

## 2022-03-30 PROCEDURE — 87636 SARSCOV2 & INF A&B AMP PRB: CPT

## 2022-03-30 PROCEDURE — 94664 DEMO&/EVAL PT USE INHALER: CPT

## 2022-03-30 RX ORDER — LORAZEPAM 0.5 MG/1
0.5 TABLET ORAL 2 TIMES DAILY
Status: COMPLETED | OUTPATIENT
Start: 2022-03-30 | End: 2022-04-01

## 2022-03-30 RX ORDER — MAGNESIUM HYDROXIDE/ALUMINUM HYDROXICE/SIMETHICONE 120; 1200; 1200 MG/30ML; MG/30ML; MG/30ML
30 SUSPENSION ORAL PRN
Status: DISCONTINUED | OUTPATIENT
Start: 2022-03-30 | End: 2022-04-05 | Stop reason: HOSPADM

## 2022-03-30 RX ORDER — ZIPRASIDONE MESYLATE 20 MG/ML
10 INJECTION, POWDER, LYOPHILIZED, FOR SOLUTION INTRAMUSCULAR ONCE
Status: COMPLETED | OUTPATIENT
Start: 2022-03-30 | End: 2022-03-30

## 2022-03-30 RX ORDER — HALOPERIDOL 5 MG
5 TABLET ORAL EVERY 6 HOURS PRN
Status: DISCONTINUED | OUTPATIENT
Start: 2022-03-30 | End: 2022-04-05 | Stop reason: HOSPADM

## 2022-03-30 RX ORDER — ALBUTEROL SULFATE 90 UG/1
2 AEROSOL, METERED RESPIRATORY (INHALATION) EVERY 6 HOURS PRN
Status: DISCONTINUED | OUTPATIENT
Start: 2022-03-30 | End: 2022-03-30

## 2022-03-30 RX ORDER — NICOTINE 21 MG/24HR
1 PATCH, TRANSDERMAL 24 HOURS TRANSDERMAL DAILY
Status: DISCONTINUED | OUTPATIENT
Start: 2022-03-30 | End: 2022-04-05 | Stop reason: HOSPADM

## 2022-03-30 RX ORDER — HYDROXYZINE PAMOATE 50 MG/1
50 CAPSULE ORAL 3 TIMES DAILY PRN
Status: DISCONTINUED | OUTPATIENT
Start: 2022-03-30 | End: 2022-04-05 | Stop reason: HOSPADM

## 2022-03-30 RX ORDER — BUDESONIDE 0.5 MG/2ML
0.5 INHALANT ORAL 2 TIMES DAILY
Status: DISCONTINUED | OUTPATIENT
Start: 2022-03-30 | End: 2022-04-05 | Stop reason: HOSPADM

## 2022-03-30 RX ORDER — ALBUTEROL SULFATE 2.5 MG/3ML
2.5 SOLUTION RESPIRATORY (INHALATION) EVERY 6 HOURS PRN
Status: DISCONTINUED | OUTPATIENT
Start: 2022-03-30 | End: 2022-04-05 | Stop reason: HOSPADM

## 2022-03-30 RX ORDER — HALOPERIDOL 5 MG/ML
5 INJECTION INTRAMUSCULAR EVERY 6 HOURS PRN
Status: DISCONTINUED | OUTPATIENT
Start: 2022-03-30 | End: 2022-04-05 | Stop reason: HOSPADM

## 2022-03-30 RX ORDER — ACETAMINOPHEN 325 MG/1
650 TABLET ORAL EVERY 6 HOURS PRN
Status: DISCONTINUED | OUTPATIENT
Start: 2022-03-30 | End: 2022-04-05 | Stop reason: HOSPADM

## 2022-03-30 RX ORDER — LANOLIN ALCOHOL/MO/W.PET/CERES
3 CREAM (GRAM) TOPICAL NIGHTLY
Status: DISCONTINUED | OUTPATIENT
Start: 2022-03-30 | End: 2022-04-05 | Stop reason: HOSPADM

## 2022-03-30 RX ORDER — 0.9 % SODIUM CHLORIDE 0.9 %
1000 INTRAVENOUS SOLUTION INTRAVENOUS ONCE
Status: COMPLETED | OUTPATIENT
Start: 2022-03-30 | End: 2022-03-30

## 2022-03-30 RX ORDER — ARFORMOTEROL TARTRATE 15 UG/2ML
15 SOLUTION RESPIRATORY (INHALATION) 2 TIMES DAILY
Status: DISCONTINUED | OUTPATIENT
Start: 2022-03-30 | End: 2022-04-05 | Stop reason: HOSPADM

## 2022-03-30 RX ORDER — BUPRENORPHINE HYDROCHLORIDE AND NALOXONE HYDROCHLORIDE DIHYDRATE 8; 2 MG/1; MG/1
1 TABLET SUBLINGUAL 2 TIMES DAILY
Status: DISCONTINUED | OUTPATIENT
Start: 2022-03-30 | End: 2022-04-05 | Stop reason: HOSPADM

## 2022-03-30 RX ORDER — RISPERIDONE 0.5 MG/1
1 TABLET, ORALLY DISINTEGRATING ORAL 2 TIMES DAILY
Status: DISCONTINUED | OUTPATIENT
Start: 2022-03-30 | End: 2022-03-31

## 2022-03-30 RX ADMIN — RISPERIDONE 1 MG: 0.5 TABLET, ORALLY DISINTEGRATING ORAL at 21:03

## 2022-03-30 RX ADMIN — BUPRENORPHINE HYDROCHLORIDE AND NALOXONE HYDROCHLORIDE DIHYDRATE 1 TABLET: 8; 2 TABLET SUBLINGUAL at 21:04

## 2022-03-30 RX ADMIN — SODIUM CHLORIDE 1000 ML: 9 INJECTION, SOLUTION INTRAVENOUS at 02:20

## 2022-03-30 RX ADMIN — BUPRENORPHINE HYDROCHLORIDE AND NALOXONE HYDROCHLORIDE DIHYDRATE 1 TABLET: 8; 2 TABLET SUBLINGUAL at 14:06

## 2022-03-30 RX ADMIN — ARFORMOTEROL TARTRATE 15 MCG: 15 SOLUTION RESPIRATORY (INHALATION) at 16:40

## 2022-03-30 RX ADMIN — SODIUM CHLORIDE 1000 ML: 9 INJECTION, SOLUTION INTRAVENOUS at 02:27

## 2022-03-30 RX ADMIN — BUDESONIDE 500 MCG: 0.5 SUSPENSION RESPIRATORY (INHALATION) at 16:41

## 2022-03-30 RX ADMIN — RISPERIDONE 1 MG: 0.5 TABLET, ORALLY DISINTEGRATING ORAL at 14:06

## 2022-03-30 RX ADMIN — LORAZEPAM 0.5 MG: 0.5 TABLET ORAL at 14:06

## 2022-03-30 RX ADMIN — LORAZEPAM 0.5 MG: 0.5 TABLET ORAL at 21:03

## 2022-03-30 RX ADMIN — Medication 3 MG: at 21:03

## 2022-03-30 RX ADMIN — ZIPRASIDONE MESYLATE 10 MG: 20 INJECTION, POWDER, LYOPHILIZED, FOR SOLUTION INTRAMUSCULAR at 02:28

## 2022-03-30 ASSESSMENT — PAIN - FUNCTIONAL ASSESSMENT: PAIN_FUNCTIONAL_ASSESSMENT: 0-10

## 2022-03-30 ASSESSMENT — LIFESTYLE VARIABLES
HISTORY_ALCOHOL_USE: NO
HISTORY_ALCOHOL_USE: NO

## 2022-03-30 ASSESSMENT — SLEEP AND FATIGUE QUESTIONNAIRES
AVERAGE NUMBER OF SLEEP HOURS: 6.5
DO YOU HAVE DIFFICULTY SLEEPING: NO
DO YOU USE A SLEEP AID: NO

## 2022-03-30 ASSESSMENT — PATIENT HEALTH QUESTIONNAIRE - PHQ9: SUM OF ALL RESPONSES TO PHQ QUESTIONS 1-9: 5

## 2022-03-30 NOTE — GROUP NOTE
Date: 3/30/2022    Group Start Time: 1820  Group End Time: 1110  Group Topic: Psychoeducation    [facility]  [address], [address]                                                                        Group Therapy Note    Date: 3/30/2022  Type of Group: Psychoeducation    Wellness Binder Information  Module Name:  self care vs coping skills  Patient's Goal:  patient will be able to id the differences and important parts of self care vs coping. Notes: pleasant and sharing in group, able to participate appropriately. Status After Intervention:  Improved    Participation Level:  Active Listener and Interactive    Participation Quality: Appropriate, Attentive, and Sharing      Speech: normal     Thought Process/Content: Logical      Affective Functioning: Congruent      Mood: euthymic      Level of consciousness:  Alert, Oriented x4, and Attentive      Response to Learning: Able to verbalize/acknowledge new learning, Able to retain information, and Progressing to goal      Endings: None Reported    Modes of Intervention: Education, Support, Socialization, Exploration, and Problem-solving      Discipline Responsible: Psychoeducational Specialist      Signature:  Niru West
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
## 2022-03-30 NOTE — ED NOTES
PT STATES SHE CANT CONCENTRATE BECAUSE OF THE VOICES AND NEEDS SOMETHING TO HELP HER RELAX     Roselia Degroot, SHERIE  03/30/22 4865

## 2022-03-30 NOTE — BH NOTE
Pt appears to be incongruent with thoughts and statements. Pt states that her appetite is great and that she drinks alcohol here and there in the [facility]. During the unit admission, pt stats that she does not consume alcohol and that she has lost about 30 lbs. Over the past month. Pt reports poor appetite as she states only eating only once per day.

## 2022-03-30 NOTE — PROGRESS NOTES
Attended afternoon meet and greet. Updated on staffing and evening programming. Patient completed patient safety plan for d/c.
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
## 2022-03-30 NOTE — ED NOTES
Department of Emergency Medicine  FIRST PROVIDER TRIAGE NOTE             Independent MLP           3/29/22  10:39 PM EDT    Date of Encounter: 3/29/22   MRN: [id number]      HPI: Sandy Mckeon is a 32 y.o. female who presents to the ED for No chief complaint on file. Patient presents to the emergency department with worsening hallucinations. Patient does have superficial lacerations to her arm from scratching herself, she reports that she has not taken meds for years. ROS: Negative for cp or sob. PE: Gen Appearance/Constitutional: alert  HEENT: NC/NT. PERRLA,  Airway patent. Initial Plan of Care: All treatment areas with department are currently occupied. Plan to order/Initiate the following while awaiting opening in ED: labs and EKG.   Initiate Treatment-Testing, Proceed toTreatment Area When Bed Available for ED Attending/MLP to Continue Care    Electronically signed by AKHIL Chisholm CNP   DD: 3/29/22         AKHIL Chisholm CNP  03/29/22 3036
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
## 2022-03-30 NOTE — H&P
Department of Psychiatry  History and Physical - Adult     CHIEF COMPLAINT: Psychotic internally stimulated    Patient was seen after discussing with the treatment team and reviewing the chart    CIRCUMSTANCES OF ADMISSION: presented to the ED as a walk-in reporting hearing voices for the \"last couple days. \"      HISTORY OF PRESENT ILLNESS:      The patient is a 32 y.o. female with significant past history of concussion, genital herpes, seizures, ulcer, paranoid schizophrenia presented to the ED as a walk-in reporting hearing voices for the \"last couple days. \"  Reported that she does not feel safe at home denies SI/HI intent or plan reports hearing her  mother's voice accusing her of killing her believes that she is able to move things around her home with her mind. In the ED patient's urine drug screen is positive for cannabis her blood alcohol level is negative she was medically cleared admitted to [facility] on psychiatric unit for further psychiatric assessment stabilization and treatment. Upon evaluation today patient is thought blocking her thoughts are delayed she appears internally stimulated. She is clearly psychotic she is not a provide much history. She is not able to tell the circumstance or hospitalization.   She denies being on any current psychotropic medications she is not able to tell us where she works she is staring blankly ahead sitting on her bed very flat blunted affect she is clearly psychotic internally stimulated seems guarded and paranoid denies SI/HI intent or plan at this time        History is extremely limited at this time due to patient's current psychiatric state she is not able to provide much history at this time    Past psychiatric history: Patient denies any history of any inpatient psychiatric hospitalization she states she was at [facility] in the past.  She denies any outpatient psychiatric treatment denies being on any psychotropic medications reports attempting suicide 3 times in the past states her sister has bipolar or schizophrenia denies any when the family committed suicide    Legal history: Patient denies    Substance use history: Patient states she smokes marijuana every once in a while however per outpatient records patient is on Suboxone    Personal family and social history: Patient states she currently lives alone with her 77-year-old child states the child is currently with her brother. She cannot member where she was working    Past Medical History:        Diagnosis Date    Concussion with loss of consciousness of 30 minutes or less 6/30/2018    Herpes genitalia     [facility]an[facility] ([facility] [address]     Seizures ([facility] [address]     Ulcer        Medications Prior to Admission:   Medications Prior to Admission: famotidine (PEPCID AC MAXIMUM STRENGTH) 20 MG tablet, Take 1 tablet by mouth 2 times daily  albuterol sulfate  (90 Base) MCG/ACT inhaler, Inhale 2 puffs into the lungs every 6 hours as needed for Wheezing  hydroCHLOROthiazide (MICROZIDE) 12.5 MG capsule, Take 1 capsule by mouth every morning  Misc. Devices MISC, 1 each by Does not apply route once for 1 dose One set of crutches. buprenorphine-naloxone (SUBOXONE) 8-2 MG FILM SL film, Place 1 Film under the tongue 2 times daily. chlorhexidine (PERIDEX) 0.12 % solution, Take 15 mLs by mouth 2 times daily (Patient not taking: Reported on 6/29/2021)  polyethylene glycol (GLYCOLAX) 17 GM/SCOOP powder, Take 17 g by mouth daily (Patient not taking: Reported on 6/29/2021)  ibuprofen (IBU) 600 MG tablet, Take 1 tablet by mouth every 6 hours as needed for Pain  triamcinolone (KENALOG) 0.1 % cream, Apply topically daily, as needed.   docosanol (ABREVA) 10 % CREA cream, Small amount to lip several times a day during herpes breakout (Patient not taking: Reported on 4/13/2021)  mometasone-formoterol (DULERA) 200-5 MCG/ACT inhaler, Inhale 2 puffs into the lungs every 12 hours  VRAYLAR 3 MG CAPS capsule, TAKE 1 CAPSULE BY MOUTH EVERY DAY  ALLERGY RELIEF 10 MG tablet, TAKE ONE TABLET BY MOUTH EVERY DAY  acetaminophen (TYLENOL) 500 MG tablet, Take 500 mg by mouth every 6 hours as needed  (Patient not taking: Reported on 6/29/2021)  EPINEPHrine (EPIPEN 2-ANGELINA) 0.3 MG/0.3ML SOAJ injection, Inject 0.3 mLs into the muscle once for 1 dose Use as directed for allergic reaction (Patient not taking: Reported on 2/24/2021)    Past Surgical History:    No past surgical history on file. Allergies:   Cephalosporins, Cherry, Cherry flavor, Ibuprofen, Other, Peanut-containing drug products, Strawberry extract, Strawberry flavor, and Sulfamethoxazole-trimethoprim    Family History  Family History   Problem Relation Age of Onset    Diabetes Mother     Diabetes Father     Other Brother              EXAMINATION:    REVIEW OF SYSTEMS:    ROS:  [x] All negative/unchanged except if checked.  Explain positive(checked items) below:  [] Constitutional  [] Eyes  [] Ear/Nose/Mouth/Throat  [] Respiratory  [] CV  [] GI  []   [] Musculoskeletal  [] Skin/Breast  [] Neurological  [] Endocrine  [] Heme/Lymph  [] Allergic/Immunologic    Explanation:     Vitals:  /65   Pulse 87   Temp 98.2 °F (36.8 °C)   Resp 18   Ht 5' 5\" (1.651 m)   Wt 165 lb (74.8 kg)   SpO2 98%   BMI 27.46 kg/m²      Physical Examination:   Head: x  Atraumatic: x normocephalic  Skin and Mucosa        Moist x  Dry   Pale  x Normal   Neck:  Thyroid  Palpable   x  Not palpable   venus distention   adenopathy   Chest: x Clear   Rhonchi     Wheezing   CV:  xS1   xS2    xNo murmer   Abdomen:  x  Soft    Tender    Viceromegaly   Extremities:  x No Edema     Edema     Cranial Nerves Examination:   CN II:   xPupils are reactive to light  Pupils are non reactive to light  CN III, IV, VI:  xNo eye deviation    No diplopia or ptosis   CN V:    xFacial Sensation is intact     Facial Sensation is not intact   CN IIIV:   x Hearing is normal to rubbing fingers   CN IX, X:     xNormal gag reflex and phonation   CN XI:   xShoulder shrug and neck rotation is normal  CNXII:    xTongue is midline no deviation or atrophy    Mental Status Examination:    Level of consciousness:  within normal limits   Appearance:  well-appearing  Behavior/Motor:  no abnormalities noted  Attitude toward examiner:  cooperative  Speech: Slow and delayed  Mood: \" I am okay. \"  Affect: Mood incongruent flat and blunted  Thought processes: Slow and thought blocking thought content: Currently internally stimulated preoccupied paranoid  denies SI/HI intent or plan  Cognition:  oriented to person, place, and time   Concentration intact  Memory impaired  Insight poor   Judgement poor   Fund of Knowledge limited      DIAGNOSIS:  Paranoid schizophrenia  Cannabis abuse          LABS: REVIEWED TODAY:  Recent Labs     03/30/22  0108   WBC 8.2   HGB 13.9        Recent Labs     03/29/22  2247      K 3.7      CO2 20*   BUN 13   CREATININE 0.7   GLUCOSE 115*     Recent Labs     03/29/22  2247   BILITOT 0.4   ALKPHOS 57   AST 25   ALT 32     Lab Results   Component Value Date    LABAMPH NOT DETECTED 03/29/2022    [address] NOT DETECTED 07/30/2011    BARBSCNU NOT DETECTED 03/29/2022    LABBENZ NOT DETECTED 03/29/2022    LABBENZ SEE BELOW 08/29/2014    CANNAB POSITIVE  07/30/2011    LABMETH NOT DETECTED 03/29/2022    OPIATESCREENURINE NOT DETECTED 03/29/2022    PHENCYCLIDINESCREENURINE NOT DETECTED 03/29/2022    ETOH <10 03/29/2022     Lab Results   Component Value Date    TSH 0.899 07/14/2020     No results found for: LITHIUM  No results found for: VALPROATE, CBMZ  No results found for: LITHIUM, VALPROATE      Radiology No results found. TREATMENT PLAN:    Risk Management: Based on the diagnosis and assessment biopsychosocial treatment model was presented to the patient and was given the opportunity to ask any question.   The patient was agreeable to the plan and all the patient's questions were answered to the patient's satisfaction. I discussed with the patient the risk, benefit, alternative and common side effects for the proposed medication treatment. The patient is consenting to this treatment. Collateral Information:  Will obtain collateral information from the family or friends. Will obtain medical records as appropriate from out patient providers  Will consult the hospitalist for a physical exam to rule out any co-morbid physical condition. Patient's diagnosis, treatment plan, medication management was formulated at the end of evaluation and after reviewing relevant documentation. Patient was seen directly by myself and Dr. Fartun Garibay 1 mg twice daily we will plan for the Perseris injection  Start Ativan 0.5 mg twice daily for 3 days for acute stabilization  Continue Suboxone 8/2 twice daily which patient takes on outpatient basis    Can discontinue constant observer. Patient is deemed to be moderate risk for suicide based on productive risk factors as well as risk mitigation    Respecters include mental health history of being on psychiatric medicationsProtective Factors     Protective factors include safe stable housing and help seeking behaviors    Prn Haldol 5mg and Vistaril 50mg q6hr for extreme agitation. Trazodone as ordered for insomnia  Vistaril as ordered for anxiety      Psychotherapy:   Encourage participation in milieu and group therapy  Individual therapy as needed              Behavioral Services  Medicare Certification Upon Admission    I certify that this patient's inpatient psychiatric hospital admission is medically necessary for:    [x] (1) Treatment which could reasonably be expected to improve this patient's condition,       [] (2) Or for diagnostic study;     AND     [x](2) The inpatient psychiatric services are provided while the individual is under the care of a physician and are included in the individualized plan of care.     Estimated length of stay/service 3 to 7 days based on stability    Plan for post-hospital care outpatient psychiatric and counseling services    Electronically signed by AKHIL Sutton CNP on 3/50/7723 at 12:54 PM      Electronically signed by AKHIL Sutton CNP on 4/08/5862 at 12:54 PM

## 2022-03-30 NOTE — ED NOTES
PT STATES SHE IS HEARING HER MOTHERS VOICES TELLING HER SHE KILLED HER     Cande Lucero, SHERIE  03/30/22 102

## 2022-03-30 NOTE — ED PROVIDER NOTES
Department of Emergency Medicine  ED Provider Note  Admit Date: 3/29/2022  Room: [address] [place] [address]      ED Physician     3/29/22  11:23 PM EDT    HPI: Sherri Phoenix 32 y.o. female presents with a complaint of hallucination beginning days ago. Complaint has been constant and became more severe today which is what prompted the visit. Denies suicidal ideation. Denies homicidal ideation. Not applicable specific plan. Patient presents to the emergency department with having auditory hallucinations over the last couple days. Patient is reporting that she does not feel safe at home. She denies any specific suicidal or homicidal ideations. She reports that she has not taken any psychiatric type medications for years. She reports that she is to follow-up with a counselor and a psychiatrist but does not currently. Patient reports that she is having active hallucinations but not able to inform us what exactly they are saying to her. Patient at times with very blank stare, appears preoccupied as well as paranoid. Patient denies any alcohol or drug use. Patient denies any recent illness. Patient with symptoms moderate in severity and persistent          Review of Systems:   Pertinent positives and negatives are stated within HPI, all other systems reviewed and are negative.      --------------------------------------------- PAST HISTORY ---------------------------------------------  Past Medical History:  has a past medical history of Concussion with loss of consciousness of 30 minutes or less, Herpes genitalia, Paranoid schizophrenia ([facility] [address] Seizures ([facility] [address] and Ulcer. Past Surgical History:  has no past surgical history on file. Social History:  reports that she has been smoking cigarettes. She has never used smokeless tobacco. She reports current alcohol use. She reports that she does not use drugs. Family History: family history includes Diabetes in her father and mother; Other in her brother.      The patients home medications have been reviewed. Allergies: Cephalosporins, Cherry, Cherry flavor, Ibuprofen, Other, Peanut-containing drug products, Strawberry extract, Strawberry flavor, and Sulfamethoxazole-trimethoprim    -------------------------------------------------- RESULTS -------------------------------------------------  All laboratory and imaging studies were reviewed by myself.     LABS:  Results for orders placed or performed during the hospital encounter of 03/29/22   Urine Drug Screen   Result Value Ref Range    Amphetamine Screen, Urine NOT DETECTED Negative <1000 ng/mL    Barbiturate Screen, Ur NOT DETECTED Negative < 200 ng/mL    Benzodiazepine Screen, Urine NOT DETECTED Negative < 200 ng/mL    Cannabinoid Scrn, Ur POSITIVE (A) Negative < 50ng/mL    Cocaine Metabolite Screen, Urine NOT DETECTED Negative < 300 ng/mL    Opiate Scrn, Ur NOT DETECTED Negative < 300ng/mL    PCP Screen, Urine NOT DETECTED Negative < 25 ng/mL    Methadone Screen, Urine NOT DETECTED Negative <300 ng/mL    Oxycodone Urine NOT DETECTED Negative <100 ng/mL    FENTANYL SCREEN, URINE NOT DETECTED Negative <1 ng/mL    Drug Screen Comment: see below    Serum Drug Screen   Result Value Ref Range    Ethanol Lvl <10 mg/dL    Acetaminophen Level <5.0 (L) 10.0 - 46.0 mcg/mL    Salicylate, Serum <8.2 0.0 - 30.0 mg/dL    TCA Scrn NEGATIVE Cutoff:300 ng/mL   Comprehensive Metabolic Panel   Result Value Ref Range    Sodium 136 132 - 146 mmol/L    Potassium 3.7 3.5 - 5.0 mmol/L    Chloride 101 98 - 107 mmol/L    CO2 20 (L) 22 - 29 mmol/L    Anion Gap 15 7 - 16 mmol/L    Glucose 115 (H) 74 - 99 mg/dL    BUN 13 6 - 20 mg/dL    CREATININE 0.7 0.5 - 1.0 mg/dL    GFR Non-African American >60 >=60 mL/min/1.73    GFR African American >60     Calcium 9.7 8.6 - 10.2 mg/dL    Total Protein 8.5 (H) 6.4 - 8.3 g/dL    Albumin 5.0 3.5 - 5.2 g/dL    Total Bilirubin 0.4 0.0 - 1.2 mg/dL    Alkaline Phosphatase 57 35 - 104 U/L    ALT 32 0 - 32 U/L    AST 25 0 - 31 U/L   Urinalysis   Result Value Ref Range    Color, UA Yellow Straw/Yellow    Clarity, UA Clear Clear    Glucose, Ur Negative Negative mg/dL    Bilirubin Urine Negative Negative    Ketones, Urine >=80 (A) Negative mg/dL    Specific Gravity, UA >=1.030 1.005 - 1.030    Blood, Urine SMALL (A) Negative    pH, UA 6.0 5.0 - 9.0    Protein, UA Negative Negative mg/dL    Urobilinogen, Urine 1.0 <2.0 E.U./dL    Nitrite, Urine Negative Negative    Leukocyte Esterase, Urine TRACE (A) Negative   SPECIMEN REJECTION   Result Value Ref Range    Rejected Test cbcwd     Reason for Rejection see below    Microscopic Urinalysis   Result Value Ref Range    Mucus, UA Present (A) None Seen /LPF    WBC, UA 2-5 0 - 5 /HPF    RBC, UA 1-3 0 - 2 /HPF    Bacteria, UA RARE (A) None Seen /HPF   CBC with Auto Differential   Result Value Ref Range    WBC 8.2 4.5 - 11.5 E9/L    RBC 4.60 3.50 - 5.50 E12/L    Hemoglobin 13.9 11.5 - 15.5 g/dL    Hematocrit 40.6 34.0 - 48.0 %    MCV 88.3 80.0 - 99.9 fL    MCH 30.2 26.0 - 35.0 pg    MCHC 34.2 32.0 - 34.5 %    RDW 13.1 11.5 - 15.0 fL    Platelets 431 648 - 780 E9/L    MPV 10.4 7.0 - 12.0 fL   POC Pregnancy Urine   Result Value Ref Range    HCG, Urine, POC Negative Negative    Lot Number [id number]     Positive QC Pass/Fail Pass     Negative QC Pass/Fail Pass    EKG 12 Lead   Result Value Ref Range    Ventricular Rate 99 BPM    Atrial Rate 99 BPM    P-R Interval 154 ms    QRS Duration 80 ms    Q-T Interval 348 ms    QTc Calculation (Bazett) 446 ms    P Axis 47 degrees    R Axis 49 degrees    T Axis 37 degrees       RADIOLOGY:  Interpreted by Radiologist.  No orders to display               ------------------------- NURSING NOTES AND VITALS REVIEWED ---------------------------   The nursing notes within the ED encounter and vital signs as below have been reviewed.    BP (!) 149/99   Pulse 104   Temp 98.9 °F (37.2 °C)   Resp 16   Ht 5' 5\" (1.651 m)   Wt 165 lb (74.8 kg)   SpO2 100%   BMI 27.46 kg/m²   Oxygen Saturation Interpretation: Normal            ---------------------------------------------------PHYSICAL EXAM--------------------------------------      Constitutional/General: Alert and oriented x3, well appearing, non toxic in NAD  Head: Normocephalic, atraumatic  Eyes: PERRL, EOMI  Mouth: Oropharynx clear, handling secretions, no trismus  Neck: Supple, full ROM, non tender to palpation in the midline, no stridor, no crepitus, no meningeal signs  Pulmonary: Lungs clear to auscultation bilaterally, no wheezes, rales, or rhonchi. Not in respiratory distress  Cardiovascular:  Regular rate and rhythm, no murmurs, gallops, or rubs. 2+ distal pulses  Abdomen: Soft, non tender, non distended, +BS, no rebound, guarding, or rigidity. No pulsatile masses appreciated  Extremities: Moves all extremities x 4. Warm and well perfused, no clubbing, cyanosis, or edema. Capillary refill <3 seconds  Skin: warm and dry without rash  Neurologic: GCS 15, CN 2-12 grossly intact, no focal deficits, symmetric strength 5/5 in the upper and lower extremities bilaterally  Psych: Flat Affect,  Patient presents to the emergency department with having auditory hallucinations over the last couple days. Patient is reporting that she does not feel safe at home. She denies any specific suicidal or homicidal ideations. She reports that she has not taken any psychiatric type medications for years. She reports that she is to follow-up with a counselor and a psychiatrist but does not currently. Patient reports that she is having active hallucinations but not able to inform us what exactly they are saying to her. Patient at times with very blank stare, appears preoccupied as well as paranoid. Patient denies any alcohol or drug use. Patient denies any recent illness.   Patient with symptoms moderate in severity and persistent      ------------------------------------------ PROGRESS NOTES ------------------------------------------     Medical decision making: Plan will be for labs, we will consult . Labs resulted urine pregnancy negative, urine drug screen positive for THC, serum drug screen negative, chemistry panel unremarkable, urinalysis negative for any urinary tract infection. Patient is positive for 80 of ketones, will provide her with 2 L IV fluids and reevaluate. Twelve-lead EKG interpreted showing heart rate of 99, normal sinus rhythm. No acute ST elevation or depression noted, Awaiting CBC. CBC unremarkable. Patient is pink slipped. Patient is medically cleared. Awaiting  evaluation. Consultations:   Social work     Re-Evaluations:        Counseling: The emergency provider has spoken with thepatient and discussed todays results, in addition to providing specific details for the plan of care and counseling regarding the diagnosis and prognosis. Questions are answered at this time and they are agreeable with the plan.         --------------------------------- IMPRESSION AND DISPOSITION ---------------------------------    IMPRESSION  1.  Paranoid schizophrenia ([facility] [address]        DISPOSITION  Disposition: as per consultation   Patient condition is stable           AKHIL Ortiz CNP  03/30/22 [address], AKHIL - CNP  03/30/22 4672

## 2022-03-30 NOTE — ED NOTES
All belongings removed from pt, placed in a belongings bag labeled and at triage. Pt placed in a gown.       Renny Shetty RN  03/29/22 6433

## 2022-03-30 NOTE — ED NOTES
Per KATRINA RN, patient has been accepted by Dr. De Souza Colder to [address]    Patient will be going to room 7517    N2N x [id number]    Estrella Pena in admitting notified of bed assignment       YNES Roland, [place]  03/30/22 5231

## 2022-03-30 NOTE — FLOWSHEET NOTE
Patient to the ED for hearing voices for the \"last couple of days\", states she does not feel safe at home but denies SI or HI. Patient states she has not been taking her medications for the last couple of years.

## 2022-03-30 NOTE — ED NOTES
PT ALERT ORIENTED SKIN WARM DRY RESP EASY PT ADMITS TO HEARING VOICES , DENIES SUICIDAL IDEATIONS APPEARS TO BE INTERNALLY STIMULATED PT HAS STARRING EYE CONTACT FLAT AFFECT IS COOPERATIVE AND CALM APPEARS PARANOID     Donna Ramírez RN  03/30/22 [address] [place] [address], RN  03/30/22 7730

## 2022-03-30 NOTE — ED NOTES
PT RESTING WITH EYES CLOSED AWAKENS EASILY ALERT ORIENTED SKIN WARM DRY RESP EASY PT STATES CURRENTLY NOT HEARING VOICES AND CONTINUES TO DENY SUICIDAL IDEATIONS PT HAS STARRING EYE CONTACT FLAT AFFECT IS CALM AND COOPERATIVE     Beryl Kussmaul, RN  03/30/22 6817

## 2022-03-30 NOTE — BH NOTE
Pt denies suicidal or homicidal ideations. Pt denies hallucinations at this time. Pt is without behavioral concerns at this time. Will follow and monitor.

## 2022-03-31 LAB
CHOLESTEROL, TOTAL: 183 MG/DL (ref 0–199)
HBA1C MFR BLD: 5.3 % (ref 4–5.6)
HDLC SERPL-MCNC: 65 MG/DL
LDL CHOLESTEROL CALCULATED: 107 MG/DL (ref 0–99)
TRIGL SERPL-MCNC: 55 MG/DL (ref 0–149)
VLDLC SERPL CALC-MCNC: 11 MG/DL

## 2022-03-31 PROCEDURE — 94640 AIRWAY INHALATION TREATMENT: CPT

## 2022-03-31 PROCEDURE — 1240000000 HC EMOTIONAL WELLNESS R&B

## 2022-03-31 PROCEDURE — 6360000002 HC RX W HCPCS: Performed by: NURSE PRACTITIONER

## 2022-03-31 PROCEDURE — 6370000000 HC RX 637 (ALT 250 FOR IP): Performed by: PSYCHIATRY & NEUROLOGY

## 2022-03-31 PROCEDURE — 6370000000 HC RX 637 (ALT 250 FOR IP): Performed by: NURSE PRACTITIONER

## 2022-03-31 PROCEDURE — 99232 SBSQ HOSP IP/OBS MODERATE 35: CPT | Performed by: NURSE PRACTITIONER

## 2022-03-31 RX ORDER — RISPERIDONE 2 MG/1
2 TABLET, ORALLY DISINTEGRATING ORAL 2 TIMES DAILY
Status: DISCONTINUED | OUTPATIENT
Start: 2022-03-31 | End: 2022-04-01

## 2022-03-31 RX ADMIN — RISPERIDONE 2 MG: 2 TABLET, ORALLY DISINTEGRATING ORAL at 21:08

## 2022-03-31 RX ADMIN — BUPRENORPHINE HYDROCHLORIDE AND NALOXONE HYDROCHLORIDE DIHYDRATE 1 TABLET: 8; 2 TABLET SUBLINGUAL at 21:07

## 2022-03-31 RX ADMIN — ARFORMOTEROL TARTRATE 15 MCG: 15 SOLUTION RESPIRATORY (INHALATION) at 09:55

## 2022-03-31 RX ADMIN — HYDROXYZINE PAMOATE 50 MG: 50 CAPSULE ORAL at 04:06

## 2022-03-31 RX ADMIN — HYDROXYZINE PAMOATE 50 MG: 50 CAPSULE ORAL at 23:07

## 2022-03-31 RX ADMIN — LORAZEPAM 0.5 MG: 0.5 TABLET ORAL at 09:23

## 2022-03-31 RX ADMIN — ARFORMOTEROL TARTRATE 15 MCG: 15 SOLUTION RESPIRATORY (INHALATION) at 20:51

## 2022-03-31 RX ADMIN — BUDESONIDE 500 MCG: 0.5 SUSPENSION RESPIRATORY (INHALATION) at 09:56

## 2022-03-31 RX ADMIN — Medication 3 MG: at 21:07

## 2022-03-31 RX ADMIN — LORAZEPAM 0.5 MG: 0.5 TABLET ORAL at 21:07

## 2022-03-31 RX ADMIN — BUPRENORPHINE HYDROCHLORIDE AND NALOXONE HYDROCHLORIDE DIHYDRATE 1 TABLET: 8; 2 TABLET SUBLINGUAL at 09:22

## 2022-03-31 RX ADMIN — RISPERIDONE 1 MG: 0.5 TABLET, ORALLY DISINTEGRATING ORAL at 09:22

## 2022-03-31 RX ADMIN — BUDESONIDE 500 MCG: 0.5 SUSPENSION RESPIRATORY (INHALATION) at 20:51

## 2022-03-31 ASSESSMENT — PAIN SCALES - GENERAL: PAINLEVEL_OUTOF10: 0

## 2022-03-31 NOTE — PROGRESS NOTES
Attended afternoon meet and greet. Updated on staffing and evening expectations. Present for spring tria. Anxiety JOEL (acute kidney injury)

## 2022-03-31 NOTE — PROGRESS NOTES
Pt woke anxious stating that she is having AH of voices in her bathroom telling her that there are evil entities in her house. Per pt, this is a new voice. Pt medicated. Will continue to monitor

## 2022-03-31 NOTE — PLAN OF CARE
Problem: Altered Mood, Deterioration in Function:  Goal: Ability to perform activities of daily living will improve  Description: Ability to perform activities of daily living will improve  Outcome: Ongoing  Goal: Able to verbalize reality based thinking  Description: Able to verbalize reality based thinking  Outcome: Ongoing  Goal: Skin appearance normal  Description: Skin appearance normal  Outcome: Ongoing  Goal: Maintenance of adequate nutrition will improve  Description: Maintenance of adequate nutrition will improve  Outcome: Ongoing  Goal: Ability to tolerate increased activity will improve  Description: Ability to tolerate increased activity will improve  Outcome: Ongoing  Goal: Participates in care planning  Description: Participates in care planning  Outcome: Ongoing     Problem: Altered Mood, Psychotic Behavior:  Goal: Able to demonstrate trust by eating, participating in treatment and following staff's direction  Description: Able to demonstrate trust by eating, participating in treatment and following staff's direction  Outcome: Ongoing  Goal: Able to verbalize decrease in frequency and intensity of hallucinations  Description: Able to verbalize decrease in frequency and intensity of hallucinations  Outcome: Ongoing  Goal: Able to verbalize reality based thinking  Description: Able to verbalize reality based thinking  Outcome: Ongoing  Goal: Absence of self-harm  Description: Absence of self-harm  Outcome: Ongoing

## 2022-03-31 NOTE — PROGRESS NOTES
Pt requested to talk to this RN and she remains very delusional with paranoid trend. She thinks that her family bugged her home and has been listening to her. She also believes that the [employer] that she had been going to had been touching her and doing rituals on her. She believes that her family is here on the unit because she can hear them talking. She states that previously one of the voices that she had been hearing was her  mother accusing her of killing her. Her affect remains expressionless throughout our interaction and speech monotone.

## 2022-03-31 NOTE — GROUP NOTE
Group Therapy Note    Date: 3/31/2022    Group Start Time: 1100  Group End Time: 1140  Group Topic: Cognitive Skills    [facility]    YNES Coley LSW        Group Therapy Note    Attendees: 13         Patient's Goal: To participate in group discussion on self-care tips and building new habits. Notes: Pt was an active participant in group discussion. Status After Intervention:  Unchanged    Participation Level:  Active Listener and Interactive    Participation Quality: Appropriate, Attentive, Sharing and Supportive      Speech:  normal      Thought Process/Content: Logical      Affective Functioning: Congruent      Mood: depressed      Level of consciousness:  Alert and Oriented x4      Response to Learning: Able to verbalize current knowledge/experience      Endings: None Reported    Modes of Intervention: Education, Support, Socialization, Exploration, Clarifying and Problem-solving      Discipline Responsible: /Counselor      Signature:  YNES Gonzalez LSW

## 2022-03-31 NOTE — GROUP NOTE
Group Therapy Note    Date: 3/31/2022    Group Start Time: 1010  Group End Time: 0935  Group Topic: Psychoeducation    [facility]  [id number] [place], [place]                                                                        Group Therapy Note    Date: 3/31/2022    Type of Group: Psychoeducation    Wellness Binder Information  Module Name: healthy relationships   Patient's Goal: patient will be able to identify key aspects to healthy relationships, and analyze current relationships. Notes: pleasant and sharing in group willing to participate appropriately with others. Status After Intervention:  Improved    Participation Level:  Active Listener and Interactive    Participation Quality: Appropriate, Attentive, Sharing, and Supportive    Speech: normal     Thought Process/Content: Logical      Affective Functioning: Congruent      Mood: euthymic      Level of consciousness:  Alert, Oriented x4, and Attentive      Response to Learning: Able to verbalize/acknowledge new learning, Able to retain information, and Progressing to goal      Endings: None Reported    Modes of Intervention: Education, Support, Socialization, Exploration, and Problem-solving      Discipline Responsible: Psychoeducational Specialist      Signature:  Randee Sheikh

## 2022-03-31 NOTE — PROGRESS NOTES
BEHAVIORAL HEALTH FOLLOW-UP NOTE     3/31/2022     Patient was seen and examined in person, Chart reviewed   Patient's case discussed with staff/team    Chief Complaint: \"The voices are bad. \"    Interim History: Patient seen up on the unit she is dispersed all she is flat and blunted states the voices are \"bad. \"  Reports hallucinations are keeping her awake at night.   She appears internally preoccupied she reports auditory hallucinations denies SI/HI intent or plan flat blunted affect    Appetite:   [x] Normal/Unchanged  [] Increased  [] Decreased      Sleep:       [x] Normal/Unchanged  [] Fair       [] Poor              Energy:    [x] Normal/Unchanged  [] Increased  [] Decreased        SI [] Present  [x] Absent    HI  []Present  [x] Absent     Aggression:  [] yes  [x] no    Patient is [x] able  [] unable to CONTRACT FOR SAFETY     PAST MEDICAL/PSYCHIATRIC HISTORY:   Past Medical History:   Diagnosis Date    Concussion with loss of consciousness of 30 minutes or less 6/30/2018    Herpes genitalia     [facility]     Seizures ([facility]     Ulcer        FAMILY/SOCIAL HISTORY:  Family History   Problem Relation Age of Onset    Diabetes Mother     Diabetes Father     Other Brother      Social History     Socioeconomic History    Marital status: Single     Spouse name: Not on file    Number of children: 1    Years of education: 8    Highest education level: 10th grade   Occupational History    Not on file   Tobacco Use    Smoking status: Current Some Day Smoker     Types: Cigarettes    Smokeless tobacco: Never Used    Tobacco comment: Started smoking again after mom passed away  2021   Vaping Use    Vaping Use: Never used   Substance and Sexual Activity    Alcohol use: Yes     Comment: occasionally-holidays    Drug use: No    Sexual activity: Yes     Partners: Male   Other Topics Concern    Not on file   Social History Narrative    2/24/2021-Stress-pt reports that she is very much MG/5ML suspension 30 mL, 30 mL, Oral, Daily PRN, Mitzi Patel MD    nicotine (NICODERM CQ) 21 MG/24HR 1 patch, 1 patch, TransDERmal, Daily, Mitzi Patel MD, 1 patch at 03/31/22 0794    aluminum & magnesium hydroxide-simethicone (MAALOX) 200-200-20 MG/5ML suspension 30 mL, 30 mL, Oral, PRN, Mitzi Patel MD    hydrOXYzine (VISTARIL) capsule 50 mg, 50 mg, Oral, TID PRN, Mitzi Patel MD, 50 mg at 03/31/22 0406    haloperidol (HALDOL) tablet 5 mg, 5 mg, Oral, Q6H PRN **OR** haloperidol lactate (HALDOL) injection 5 mg, 5 mg, IntraMUSCular, Q6H PRN, Mitzi Patel MD    melatonin tablet 3 mg, 3 mg, Oral, Nightly, Mitzi Patel MD, 3 mg at 03/30/22 2103    buprenorphine-naloxone (SUBOXONE) 8-2 MG SL tablet 1 tablet, 1 tablet, SubLINGual, BID, AKHIL Hernandez CNP, 1 tablet at 03/31/22 4144    risperiDONE (RISPERDAL M-TABS) disintegrating tablet 1 mg, 1 mg, Oral, BID, AKHIL Hernandez CNP, 1 mg at 03/31/22 3132    LORazepam (ATIVAN) tablet 0.5 mg, 0.5 mg, Oral, BID, AKHIL Siddiqui CNP, 0.5 mg at 03/31/22 9046    albuterol (PROVENTIL) nebulizer solution 2.5 mg, 2.5 mg, Nebulization, I0F PRN, AKHIL Siddiqui CNP    budesonide (PULMICORT) nebulizer suspension 500 mcg, 0.5 mg, Nebulization, BID, AKHIL Siddiqui - GINI, 546 mcg at 03/30/22 1641    Arformoterol Tartrate (BROVANA) nebulizer solution 15 mcg, 15 mcg, Nebulization, BID, AKHIL Moreland CNP, 15 mcg at 03/30/22 1640      Examination:  BP (!) 150/90   Pulse 79   Temp 97.8 °F (36.6 °C)   Resp 14   Ht 5' 5\" (1.651 m)   Wt 165 lb (74.8 kg)   LMP 02/06/2022 (Approximate)   SpO2 100%   BMI 27.46 kg/m²   Gait - steady  Medication side effects(SE): Denies    Mental Status Examination:    Level of consciousness:  within normal limits   Appearance:  fair grooming and fair hygiene  Behavior/Motor:  no abnormalities noted  Attitude toward examiner:  cooperative  Speech: Normal rate rhythm and tone not offer much conversation  Mood: \"I am okay. \"  Affect: Flat and blunted  Thought processes:  linear   Thought content: Endorses auditory hallucinations devoid of visual hallucinations delusions or the perceptual abnormalities. Denies SI/HI intent or plan  Cognition:  oriented to person, place, and time   Concentration intact  Insight poor   Judgement poor     ASSESSMENT:  Patient symptoms are:  [] Well controlled  [x] Improving  [] Worsening  [] No change      Diagnosis:  Principal Problem:    Paranoid schizophrenia ([facility]  Active Problems:    Cannabis abuse  Resolved Problems:    * No resolved hospital problems. *      LABS:    Recent Labs     03/30/22  0108   WBC 8.2   HGB 13.9        Recent Labs     03/29/22  2247      K 3.7      CO2 20*   BUN 13   CREATININE 0.7   GLUCOSE 115*     Recent Labs     03/29/22  2247   BILITOT 0.4   ALKPHOS 57   AST 25   ALT 32     Lab Results   Component Value Date    LABAMPH NOT DETECTED 03/29/2022    LABAMPH NOT DETECTED 07/30/2011    BARBSCNU NOT DETECTED 03/29/2022    LABBENZ NOT DETECTED 03/29/2022    LABBENZ SEE BELOW 08/29/2014    CANNAB POSITIVE  07/30/2011    LABMETH NOT DETECTED 03/29/2022    OPIATESCREENURINE NOT DETECTED 03/29/2022    PHENCYCLIDINESCREENURINE NOT DETECTED 03/29/2022    ETOH <10 03/29/2022     Lab Results   Component Value Date    TSH 0.899 07/14/2020     No results found for: LITHIUM  No results found for: VALPROATE, CBMZ      Treatment Plan:  Reviewed current Medications with the patient. Risks, benefits, side effects, drug-to-drug interactions and alternatives to treatment were discussed. Collateral information:   CD evaluation  Encourage patient to attend group and other milieu activities.   Discharge planning discussed with the patient and treatment team.    Increase Risperdal 2 mg twice daily  Continue Ativan 0.5 mg twice daily for quick stabilization    PSYCHOTHERAPY/COUNSELING:  [x] Therapeutic interview  [x] Supportive  [] CBT  [] Ongoing  [] Other    [x] Patient continues to need, on a daily basis, active treatment furnished directly by or requiring the supervision of inpatient psychiatric personnel      Anticipated Length of stay: 3 to 7 days based on stability            Electronically signed by AKHIL Neely CNP on 0/60/0702 at 9:37 AM

## 2022-03-31 NOTE — PLAN OF CARE
Problem: Altered Mood, Psychotic Behavior:  Goal: Able to verbalize decrease in frequency and intensity of hallucinations  Description: Able to verbalize decrease in frequency and intensity of hallucinations  3/31/2022 1705 by Peyton Bethea RN  Outcome: Ongoing     Problem: Altered Mood, Psychotic Behavior:  Goal: Able to verbalize reality based thinking  Description: Able to verbalize reality based thinking  3/31/2022 1705 by Peyton Bethea RN  Outcome: Ongoing   Pt is out on the unit but is sitting in dining area by herself and reading a book. She does not socialize with peers and is superficial on approach. Her responses are somewhat delayed and presents as preoccupied. She denies any depression and harmful ideations but she does admit to hearing voices that talk to each other. She states that they do not bother her but they are listening.

## 2022-03-31 NOTE — PROGRESS NOTES
Pt was awake late reading her book in her room and out in the mileu. Pt denies any needs. Denies SI, HI, and AVH. \"I just want to read\". Pt fell asleep at the table and was woke and sent to bed to sleep. Pt was pleasant and did so. Will continue to monitor q 15 min for safety.

## 2022-03-31 NOTE — PROGRESS NOTES
Comprehensive Nutrition Assessment    Type and Reason for Visit:  Initial,Positive Nutrition Screen    Nutrition Recommendations/Plan: Recommend and start Ensure Enlive supplement daily and Magic Cup daily to help meet nutritional needs. Nutrition Assessment:  Patient adm w/ decreased po intake of meal PTA x 1 months (pt states she only eats once a day) ; adm w/ hallucinations ; hx of paranoid schizophrenia and seizures ; will start ONS    Malnutrition Assessment:  Malnutrition Status: At risk for malnutrition (Comment)    Context:  Acute Illness     Findings of the 6 clinical characteristics of malnutrition:  Energy Intake:  1 - 75% or less of estimated energy requirements for 7 or more days  Weight Loss:  Unable to assess (d/t lack of actual weight history ; subjective weight loss noted although no evidence of this)     Body Fat Loss:  Unable to assess     Muscle Mass Loss:  Unable to assess    Fluid Accumulation:  No significant fluid accumulation     Strength:  Not Performed    Estimated Daily Nutrient Needs:  Energy (kcal):  8440-6149 (REE 1466 x 1.1 SF); Weight Used for Energy Requirements:  Current     Protein (g):  70-80 (1.2-1.4g/kg IBW); Weight Used for Protein Requirements:  Ideal        Fluid (ml/day):  6306-1743; Method Used for Fluid Requirements:  1 ml/kcal      Nutrition Related Findings:  I&Os WNL, no edema, active BS, decreased po intake PTA      Wounds:  None       Current Nutrition Therapies:    ADULT DIET; Regular    Anthropometric Measures:  · Height: 5' 5\" (165.1 cm)  · Current Body Weight: 165 lb (74.8 kg) (3/30, stated)   · Usual Body Weight:  (UTO ; EMR shows past weights of 215# no method on 12/10/21 and on 4/19/21 ; subjective weight loss noted of 30# in the past 1 month although no evidence of this)     · Ideal Body Weight: 125 lbs; % Ideal Body Weight 132 %   · BMI: 27.5  · BMI Categories: Overweight (BMI 25.0-29. 9)       Nutrition Diagnosis:   · Inadequate oral intake related to psychological cause or life stress (hx of paranoid schizophrenia) as evidenced by poor intake prior to admission,intake 26-50%      Nutrition Interventions:   Food and/or Nutrient Delivery:  Continue Current Diet,Start Oral Nutrition Supplement  Nutrition Education/Counseling:  Education not indicated   Coordination of Nutrition Care:  Continue to monitor while inpatient    Goals:  Patient will consume ~75% of meals served       Nutrition Monitoring and Evaluation:   Behavioral-Environmental Outcomes:  None Identified   Food/Nutrient Intake Outcomes:  Food and Nutrient Intake,Supplement Intake  Physical Signs/Symptoms Outcomes:  Biochemical Data,Chewing or Swallowing,GI Status,Fluid Status or Edema,Hemodynamic Status,Meal Time Behavior,Nutrition Focused Physical Findings,Skin,Weight     Discharge Planning:     Too soon to determine     Electronically signed by Logan Israel RD, LD on 3/31/22 at 9:51 AM EDT    Contact: [phone]

## 2022-03-31 NOTE — PROGRESS NOTES
Attended morning community meeting. Updated on staffing and daily expectations. Declined to set goal for the day.

## 2022-03-31 NOTE — CARE COORDINATION
Sw spoke with pt. Pt reports feeling alright today, denied SI/HI/VH. Pt reports AH of people talking, stated they are not commanding. Pt stated she plans to return home where she lives alone and follow up with [facility]. Pt cooperative, calm, fair eye contact, speech clear, insight/judgement fair.

## 2022-04-01 PROCEDURE — 6370000000 HC RX 637 (ALT 250 FOR IP): Performed by: PSYCHIATRY & NEUROLOGY

## 2022-04-01 PROCEDURE — 6360000002 HC RX W HCPCS: Performed by: NURSE PRACTITIONER

## 2022-04-01 PROCEDURE — 94640 AIRWAY INHALATION TREATMENT: CPT

## 2022-04-01 PROCEDURE — 1240000000 HC EMOTIONAL WELLNESS R&B

## 2022-04-01 PROCEDURE — 99232 SBSQ HOSP IP/OBS MODERATE 35: CPT | Performed by: NURSE PRACTITIONER

## 2022-04-01 PROCEDURE — 6370000000 HC RX 637 (ALT 250 FOR IP): Performed by: NURSE PRACTITIONER

## 2022-04-01 RX ORDER — RISPERIDONE 2 MG/1
2 TABLET, ORALLY DISINTEGRATING ORAL DAILY
Status: DISCONTINUED | OUTPATIENT
Start: 2022-04-02 | End: 2022-04-03

## 2022-04-01 RX ADMIN — BUPRENORPHINE HYDROCHLORIDE AND NALOXONE HYDROCHLORIDE DIHYDRATE 1 TABLET: 8; 2 TABLET SUBLINGUAL at 09:59

## 2022-04-01 RX ADMIN — BUPRENORPHINE HYDROCHLORIDE AND NALOXONE HYDROCHLORIDE DIHYDRATE 1 TABLET: 8; 2 TABLET SUBLINGUAL at 21:13

## 2022-04-01 RX ADMIN — ARFORMOTEROL TARTRATE 15 MCG: 15 SOLUTION RESPIRATORY (INHALATION) at 20:15

## 2022-04-01 RX ADMIN — RISPERIDONE 3 MG: 2 TABLET, ORALLY DISINTEGRATING ORAL at 21:13

## 2022-04-01 RX ADMIN — LORAZEPAM 0.5 MG: 0.5 TABLET ORAL at 09:59

## 2022-04-01 RX ADMIN — BUDESONIDE 500 MCG: 0.5 SUSPENSION RESPIRATORY (INHALATION) at 20:15

## 2022-04-01 RX ADMIN — LORAZEPAM 0.5 MG: 0.5 TABLET ORAL at 21:13

## 2022-04-01 RX ADMIN — Medication 3 MG: at 21:13

## 2022-04-01 RX ADMIN — RISPERIDONE 2 MG: 2 TABLET, ORALLY DISINTEGRATING ORAL at 09:59

## 2022-04-01 ASSESSMENT — PAIN SCALES - GENERAL: PAINLEVEL_OUTOF10: 0

## 2022-04-01 NOTE — PROGRESS NOTES
Group Therapy Note      Date: 4/1/2022  Start Time: 10:00  End Time: 10:45  Number of Participants:12    Type of Group: Psychoeducation    Wellness Binder Information  Module Name: Steps to wellness      Patient's Goal: To id positive steps to wellness recovery. Notes: Attended group and was able to participate. Status After Intervention:  Improved    Participation Level:  Active Listener    Participation Quality: Attentive      Speech:  hesitant      Thought Process/Content: Linear      Affective Functioning: Flat      Mood: depressed      Level of consciousness:  Alert      Response to Learning: Progressing to goal      Endings: None Reported    Modes of Intervention: Education and Support      Discipline Responsible: Psychoeducational Specialist      Signature:  SUSAN Álvarez

## 2022-04-01 NOTE — PROGRESS NOTES
BEHAVIORAL HEALTH FOLLOW-UP NOTE     2022     Patient was seen and examined in person, Chart reviewed   Patient's case discussed with staff/team    Chief Complaint: Paranoid with auditory hallucinations    Interim History: Patient seen during treatment team.  Per the chart patient remains delusional and paranoid believes her family bugged her home and has been listening to her. She also reported to nursing staff that she believes the Sabianist has been touching her and doing rituals on her. In treatment team today she is flat blunted states that the voices have been accusing her of killing her  mother. She appears internally preoccupied her thoughts are delayed she remains psychotic    Appetite:   [x] Normal/Unchanged  [] Increased  [] Decreased      Sleep:       [x] Normal/Unchanged  [] Fair       [] Poor              Energy:    [x] Normal/Unchanged  [] Increased  [] Decreased        SI [] Present  [x] Absent    HI  []Present  [x] Absent     Aggression:  [] yes  [x] no    Patient is [x] able  [] unable to CONTRACT FOR SAFETY     PAST MEDICAL/PSYCHIATRIC HISTORY:   Past Medical History:   Diagnosis Date    Concussion with loss of consciousness of 30 minutes or less 2018    Herpes genitalia     [facility] schizophrenia ([facility]     Seizures ([facility]     Ulcer        FAMILY/SOCIAL HISTORY:  Family History   Problem Relation Age of Onset    Diabetes Mother     Diabetes Father     Other Brother      Social History     Socioeconomic History    Marital status: Single     Spouse name: Not on file    Number of children: 1    Years of education: 8    Highest education level: 10th grade   Occupational History    Not on file   Tobacco Use    Smoking status: Current Some Day Smoker     Types: Cigarettes    Smokeless tobacco: Never Used    Tobacco comment: Started smoking again after mom passed away     Vaping Use    Vaping Use: Never used   Substance and Sexual Activity    Alcohol use:  Yes Comment: occasionally-holidays    Drug use: No    Sexual activity: Yes     Partners: Male   Other Topics Concern    Not on file   Social History Narrative    2/24/2021-Stress-pt reports that she is very much stressed-pt recently lost her mother and she has been having a hard time dealing with her loss. Pt is currently receiving grief counseling through [facility] in [place] [address] Determinants of Health     Financial Resource Strain:     Difficulty of Paying Living Expenses: Not on file   Food Insecurity:     Worried About Running Out of Food in the Last Year: Not on file    Suzanne of Food in the Last Year: Not on file   Transportation Needs:     Lack of Transportation (Medical): Not on file    Lack of Transportation (Non-Medical): Not on file   Physical Activity:     Days of Exercise per Week: Not on file    Minutes of Exercise per Session: Not on file   Stress:     Feeling of Stress : Not on file   Social Connections:     Frequency of Communication with Friends and Family: Not on file    Frequency of Social Gatherings with Friends and Family: Not on file    Attends [employer]ism Services: Not on file    Active Member of [address] [place] [address] [employer] or Organizations: Not on file    Attends Club or Organization Meetings: Not on file    Marital Status: Not on file   Intimate Partner Violence:     Fear of Current or Ex-Partner: Not on file    Emotionally Abused: Not on file    Physically Abused: Not on file    Sexually Abused: Not on file   Housing Stability:     Unable to Pay for Housing in the Last Year: Not on file    Number of [place] in the Last Year: Not on file    Unstable Housing in the Last Year: Not on file           ROS:  [x] All negative/unchanged except if checked.  Explain positive(checked items) below:  [] Constitutional  [] Eyes  [] Ear/Nose/Mouth/Throat  [] Respiratory  [] CV  [] GI  []   [] Musculoskeletal  [] Skin/Breast  [] Neurological  [] Endocrine  [] Heme/Lymph  [] Allergic/Immunologic    Explanation:     MEDICATIONS:    Current Facility-Administered Medications:     risperiDONE (RISPERDAL M-TABS) disintegrating tablet 2 mg, 2 mg, Oral, BID, AKHIL Plata CNP, 2 mg at 04/01/22 0959    acetaminophen (TYLENOL) tablet 650 mg, 650 mg, Oral, Q6H PRN, Sunshine Henderson MD    magnesium hydroxide (MILK OF MAGNESIA) 400 MG/5ML suspension 30 mL, 30 mL, Oral, Daily PRN, Sunshine Henderson MD    nicotine (NICODERM CQ) 21 MG/24HR 1 patch, 1 patch, TransDERmal, Daily, Sunshine Henderson MD, 1 patch at 04/01/22 0958    aluminum & magnesium hydroxide-simethicone (MAALOX) 200-200-20 MG/5ML suspension 30 mL, 30 mL, Oral, PRN, Sunshine Henderson MD    hydrOXYzine (VISTARIL) capsule 50 mg, 50 mg, Oral, TID PRN, Sunshine Henderson MD, 50 mg at 03/31/22 2307    haloperidol (HALDOL) tablet 5 mg, 5 mg, Oral, Q6H PRN **OR** haloperidol lactate (HALDOL) injection 5 mg, 5 mg, IntraMUSCular, Q6H PRN, Sunshine Henderson MD    melatonin tablet 3 mg, 3 mg, Oral, Nightly, Sunshine Henderson MD, 3 mg at 03/31/22 2107    buprenorphine-naloxone (SUBOXONE) 8-2 MG SL tablet 1 tablet, 1 tablet, SubLINGual, BID, AKHIL Booth CNP, 1 tablet at 04/01/22 0959    LORazepam (ATIVAN) tablet 0.5 mg, 0.5 mg, Oral, BID, AKHIL Plata - CNP, 0.5 mg at 04/01/22 0959    albuterol (PROVENTIL) nebulizer solution 2.5 mg, 2.5 mg, Nebulization, S3J PRN, AKHIL Plata CNP    budesonide (PULMICORT) nebulizer suspension 500 mcg, 0.5 mg, Nebulization, BID, AKHIL Booth CNP, 083 mcg at 03/31/22 2051    Arformoterol Tartrate (BROVANA) nebulizer solution 15 mcg, 15 mcg, Nebulization, BID, Daiana Brock, AKHIL - CNP, 15 mcg at 03/31/22 2051      Examination:  /65   Pulse 84   Temp 97.7 °F (36.5 °C) (Tympanic)   Resp 15   Ht 5' 5\" (1.651 m)   Wt 165 lb (74.8 kg)   LMP 02/06/2022 (Approximate)   SpO2 99%   BMI 27.46 kg/m²   Gait - steady  Medication side effects(SE): Denies    Mental Status Examination:    Level of consciousness:  within normal limits   Appearance:  fair grooming and fair hygiene  Behavior/Motor:  no abnormalities noted  Attitude toward examiner:  cooperative  Speech: Normal rate rhythm and tone not offer much conversation  Mood: \"I am okay. \"  Affect: Flat and blunted  Thought processes:  linear   Thought content: Endorses auditory hallucinations appears internally preoccupied delusional paranoid denies SI/HI intent or plan  Cognition:  oriented to person, place, and time   Concentration intact  Insight poor   Judgement poor     ASSESSMENT:  Patient symptoms are:  [] Well controlled  [x] Improving  [] Worsening  [] No change      Diagnosis:  Principal Problem:    [facility] ([facility]  Active Problems:    Cannabis abuse  Resolved Problems:    * No resolved hospital problems. *      LABS:    Recent Labs     03/30/22  0108   WBC 8.2   HGB 13.9        Recent Labs     03/29/22  2247      K 3.7      CO2 20*   BUN 13   CREATININE 0.7   GLUCOSE 115*     Recent Labs     03/29/22  2247   BILITOT 0.4   ALKPHOS 57   AST 25   ALT 32     Lab Results   Component Value Date    LABAMPH NOT DETECTED 03/29/2022    LABAMPH NOT DETECTED 07/30/2011    BARBSCNU NOT DETECTED 03/29/2022    LABBENZ NOT DETECTED 03/29/2022    LABBENZ SEE BELOW 08/29/2014    CANNAB POSITIVE  07/30/2011    LABMETH NOT DETECTED 03/29/2022    OPIATESCREENURINE NOT DETECTED 03/29/2022    PHENCYCLIDINESCREENURINE NOT DETECTED 03/29/2022    ETOH <10 03/29/2022     Lab Results   Component Value Date    TSH 0.899 07/14/2020     No results found for: LITHIUM  No results found for: VALPROATE, CBMZ      Treatment Plan:  Reviewed current Medications with the patient. Risks, benefits, side effects, drug-to-drug interactions and alternatives to treatment were discussed. Collateral information:   CD evaluation  Encourage patient to attend group and other milieu activities.   Discharge planning discussed with the patient and treatment team.    Increase Resporal 2 mg daily 3 mg at bedtime  Continue Ativan 0.5 mg twice daily for quick stabilization    PSYCHOTHERAPY/COUNSELING:  [x] Therapeutic interview  [x] Supportive  [] CBT  [] Ongoing  [] Other    [x] Patient continues to need, on a daily basis, active treatment furnished directly by or requiring the supervision of inpatient psychiatric personnel      Anticipated Length of stay: 3 to 7 days based on stability            Electronically signed by AKHIL Hatfield CNP on 6/4/6940 at 12:28 PM

## 2022-04-01 NOTE — GROUP NOTE
Group Therapy Note    Date: 4/1/2022    Group Start Time: 1105  Group End Time: 8140  Group Topic: Psychotherapy    [facility]    Thankful YNES Cody, SUSAN        Group Therapy Note    Attendees: 7         Patient's Goal:  To increase social interaction and improve relationships with others. Notes:  Pt was lethargic in group but was able to identify an agenda. She was able to verbalize relating to others within the group. Status After Intervention:  Unchanged    Participation Level: Minimal    Participation Quality: Lethargic      Speech:  hesitant      Thought Process/Content: Perseverating      Affective Functioning: Flat      Mood: depressed      Level of consciousness:  Drowsy      Response to Learning: Resistant      Endings: None Reported    Modes of Intervention: Education, Support, Socialization, Exploration, Clarifying and Problem-solving      Discipline Responsible: /Counselor      Signature:   YNES Nielson, SUSAN

## 2022-04-02 PROCEDURE — 6360000002 HC RX W HCPCS: Performed by: NURSE PRACTITIONER

## 2022-04-02 PROCEDURE — 1240000000 HC EMOTIONAL WELLNESS R&B

## 2022-04-02 PROCEDURE — 99232 SBSQ HOSP IP/OBS MODERATE 35: CPT | Performed by: NURSE PRACTITIONER

## 2022-04-02 PROCEDURE — 6370000000 HC RX 637 (ALT 250 FOR IP): Performed by: NURSE PRACTITIONER

## 2022-04-02 PROCEDURE — 6370000000 HC RX 637 (ALT 250 FOR IP): Performed by: PSYCHIATRY & NEUROLOGY

## 2022-04-02 PROCEDURE — 94640 AIRWAY INHALATION TREATMENT: CPT

## 2022-04-02 RX ADMIN — BUPRENORPHINE HYDROCHLORIDE AND NALOXONE HYDROCHLORIDE DIHYDRATE 1 TABLET: 8; 2 TABLET SUBLINGUAL at 09:15

## 2022-04-02 RX ADMIN — ARFORMOTEROL TARTRATE 15 MCG: 15 SOLUTION RESPIRATORY (INHALATION) at 10:06

## 2022-04-02 RX ADMIN — RISPERIDONE 3 MG: 2 TABLET, ORALLY DISINTEGRATING ORAL at 21:09

## 2022-04-02 RX ADMIN — Medication 3 MG: at 21:09

## 2022-04-02 RX ADMIN — BUDESONIDE 500 MCG: 0.5 SUSPENSION RESPIRATORY (INHALATION) at 10:07

## 2022-04-02 RX ADMIN — BUPRENORPHINE HYDROCHLORIDE AND NALOXONE HYDROCHLORIDE DIHYDRATE 1 TABLET: 8; 2 TABLET SUBLINGUAL at 21:09

## 2022-04-02 RX ADMIN — RISPERIDONE 2 MG: 2 TABLET, ORALLY DISINTEGRATING ORAL at 09:15

## 2022-04-02 ASSESSMENT — PAIN SCALES - GENERAL
PAINLEVEL_OUTOF10: 0
PAINLEVEL_OUTOF10: 0

## 2022-04-02 NOTE — PROGRESS NOTES
BEHAVIORAL HEALTH FOLLOW-UP NOTE     4/2/2022     Patient was seen and examined in person, Chart reviewed   Patient's case discussed with staff/team    Chief Complaint: Paranoid and delusional      Interim History: Patient upon the unit sitting alone doing a puzzle staff reports she was out socializing with peers yesterday upon evaluation today for she denies auditory hallucinations that she said \"they are they are.\"  She continues report auditory hallucinations of voices found that she killed her mother last night per staff she was delusional believing that her Catholic was doing rituals on her.         Appetite:   [x] Normal/Unchanged  [] Increased  [] Decreased      Sleep:       [x] Normal/Unchanged  [] Fair       [] Poor              Energy:    [x] Normal/Unchanged  [] Increased  [] Decreased        SI [] Present  [x] Absent    HI  []Present  [x] Absent     Aggression:  [] yes  [x] no    Patient is [x] able  [] unable to CONTRACT FOR SAFETY     PAST MEDICAL/PSYCHIATRIC HISTORY:   Past Medical History:   Diagnosis Date    Concussion with loss of consciousness of 30 minutes or less 6/30/2018    Herpes genitalia     [facility] ([facility]     [facility]     Ulcer        FAMILY/SOCIAL HISTORY:  Family History   Problem Relation Age of Onset    Diabetes Mother     Diabetes Father     Other Brother      Social History     Socioeconomic History    Marital status: Single     Spouse name: Not on file    Number of children: 1    Years of education: 8    Highest education level: 10th grade   Occupational History    Not on file   Tobacco Use    Smoking status: Current Some Day Smoker     Types: Cigarettes    Smokeless tobacco: Never Used    Tobacco comment: Started smoking again after mom passed away  2021   Vaping Use    Vaping Use: Never used   Substance and Sexual Activity    Alcohol use: Yes     Comment: occasionally-holidays    Drug use: No    Sexual activity: Yes     Partners: Male   Other Topics Concern    Not on file   Social History Narrative    2/24/2021-Stress-pt reports that she is very much stressed-pt recently lost her mother and she has been having a hard time dealing with her loss. Pt is currently receiving grief counseling through [facility] in [place] [id number] Determinants of Health     Financial Resource Strain:     Difficulty of Paying Living Expenses: Not on file   Food Insecurity:     Worried About Running Out of Food in the Last Year: Not on file    Suzanne of Food in the Last Year: Not on file   Transportation Needs:     Lack of Transportation (Medical): Not on file    Lack of Transportation (Non-Medical): Not on file   Physical Activity:     Days of Exercise per Week: Not on file    Minutes of Exercise per Session: Not on file   Stress:     Feeling of Stress : Not on file   Social Connections:     Frequency of Communication with Friends and Family: Not on file    Frequency of Social Gatherings with Friends and Family: Not on file    Attends Church Services: Not on file    Active Member of [address] [place] [address] [employer] or Organizations: Not on file    Attends Club or Organization Meetings: Not on file    Marital Status: Not on file   Intimate Partner Violence:     Fear of Current or Ex-Partner: Not on file    Emotionally Abused: Not on file    Physically Abused: Not on file    Sexually Abused: Not on file   Housing Stability:     Unable to Pay for Housing in the Last Year: Not on file    Number of [place] in the Last Year: Not on file    Unstable Housing in the Last Year: Not on file           ROS:  [x] All negative/unchanged except if checked.  Explain positive(checked items) below:  [] Constitutional  [] Eyes  [] Ear/Nose/Mouth/Throat  [] Respiratory  [] CV  [] GI  []   [] Musculoskeletal  [] Skin/Breast  [] Neurological  [] Endocrine  [] Heme/Lymph  [] Allergic/Immunologic    Explanation:     MEDICATIONS:    Current Facility-Administered Medications:     risperiDONE (RISPERDAL M-TABS) disintegrating tablet 2 mg, 2 mg, Oral, Daily, AKHIL Moreland CNP    risperiDONE (RISPERDAL M-TABS) disintegrating tablet 3 mg, 3 mg, Oral, Nightly, AKHIL Da Silva CNP, 3 mg at 04/01/22 2113    acetaminophen (TYLENOL) tablet 650 mg, 650 mg, Oral, Q6H PRN, Olivia Pollock MD    magnesium hydroxide (MILK OF MAGNESIA) 400 MG/5ML suspension 30 mL, 30 mL, Oral, Daily PRN, Olivia Pollock MD    nicotine (NICODERM CQ) 21 MG/24HR 1 patch, 1 patch, TransDERmal, Daily, Olivia Pollock MD, 1 patch at 04/01/22 4798    aluminum & magnesium hydroxide-simethicone (MAALOX) 200-200-20 MG/5ML suspension 30 mL, 30 mL, Oral, PRN, Olivia Pollock MD    hydrOXYzine (VISTARIL) capsule 50 mg, 50 mg, Oral, TID PRN, Olivia Pollock MD, 50 mg at 03/31/22 2307    haloperidol (HALDOL) tablet 5 mg, 5 mg, Oral, Q6H PRN **OR** haloperidol lactate (HALDOL) injection 5 mg, 5 mg, IntraMUSCular, Q6H PRN, Olivia Pollock MD    melatonin tablet 3 mg, 3 mg, Oral, Nightly, Olivia Pollock MD, 3 mg at 04/01/22 2113    buprenorphine-naloxone (SUBOXONE) 8-2 MG SL tablet 1 tablet, 1 tablet, SubLINGual, BID, AKHIL Wilkerson CNP, 1 tablet at 04/01/22 2113    albuterol (PROVENTIL) nebulizer solution 2.5 mg, 2.5 mg, Nebulization, I9S PRN, AKHIL Da Silva CNP    budesonide (PULMICORT) nebulizer suspension 500 mcg, 0.5 mg, Nebulization, BID, AKHIL Wilkerson CNP, 794 mcg at 04/01/22 2015    Arformoterol Tartrate (BROVANA) nebulizer solution 15 mcg, 15 mcg, Nebulization, BID, Bela Streeter Mikal, APRN - CNP, 15 mcg at 04/01/22 2015      Examination:  BP (!) 105/58   Pulse 99   Temp 97.2 °F (36.2 °C) (Temporal)   Resp 15   Ht 5' 5\" (1.651 m)   Wt 165 lb (74.8 kg)   LMP 02/06/2022 (Approximate)   SpO2 99%   BMI 27.46 kg/m²   Gait - steady  Medication side effects(SE): Denies    Mental Status Examination:    Level of consciousness:  within normal limits   Appearance:  fair grooming and fair hygiene  Behavior/Motor:  no abnormalities noted  Attitude toward examiner:  cooperative  Speech: Normal rate rhythm and tone not offer much conversation  Mood: \"I am okay. \"  Affect: Flat and blunted  Thought processes:  linear   Thought content: Endorses auditory hallucinations appears internally preoccupied delusional paranoid denies SI/HI intent or plan  Cognition:  oriented to person, place, and time   Concentration intact  Insight poor   Judgement poor     ASSESSMENT:  Patient symptoms are:  [] Well controlled  [x] Improving  [] Worsening  [] No change      Diagnosis:  Principal Problem:    Paranoid schizophrenia ([facility]  Active Problems:    Cannabis abuse  Resolved Problems:    * No resolved hospital problems. *      LABS:    No results for input(s): WBC, HGB, PLT in the last 72 hours. No results for input(s): NA, K, CL, CO2, BUN, CREATININE, GLUCOSE in the last 72 hours. No results for input(s): BILITOT, ALKPHOS, AST, ALT in the last 72 hours. Lab Results   Component Value Date    LABAMPH NOT DETECTED 03/29/2022    [facility] NOT DETECTED 07/30/2011    BARBSCNU NOT DETECTED 03/29/2022    LABBENZ NOT DETECTED 03/29/2022    LABBENZ SEE BELOW 08/29/2014    CANNAB POSITIVE  07/30/2011    LABMETH NOT DETECTED 03/29/2022    OPIATESCREENURINE NOT DETECTED 03/29/2022    PHENCYCLIDINESCREENURINE NOT DETECTED 03/29/2022    ETOH <10 03/29/2022     Lab Results   Component Value Date    TSH 0.899 07/14/2020     No results found for: LITHIUM  No results found for: VALPROATE, CBMZ      Treatment Plan:  Reviewed current Medications with the patient. Risks, benefits, side effects, drug-to-drug interactions and alternatives to treatment were discussed. Collateral information:   CD evaluation  Encourage patient to attend group and other milieu activities.   Discharge planning discussed with the patient and treatment team.    Continue Resporal 2 mg daily 3 mg at bedtime      PSYCHOTHERAPY/COUNSELING:  [x] Therapeutic interview  [x] Supportive  [] CBT  [] Ongoing  [] Other    [x] Patient continues to need, on a daily basis, active treatment furnished directly by or requiring the supervision of inpatient psychiatric personnel      Anticipated Length of stay: 3 to 7 days based on stability            Electronically signed by AKHIL Ball CNP on 9/9/1937 at 8:36 AM

## 2022-04-02 NOTE — BH NOTE
Pt is stable and without distress at this time. Pt has a monotone speech, flat affect. Is out on unit but is isolative to self. Pt denies suicidal / homicidal ideations. Pt reports hearing voices that are talking to each other. Pt denies hearing commands from them. Pt denies visual hallucinations. Pt does not appear to be depressed at this time. Will follow and monitor.

## 2022-04-02 NOTE — GROUP NOTE
Group Therapy Note    Date: 4/2/2022    Group Start Time: 1100  Group End Time: 1130  Group Topic: Cognitive Skills    [facility]    YNES Coley LSW        Group Therapy Note    Attendees: 12         Patient's Goal: To participate in group discussion on setting boundaries. Notes: pt was an active listener in group discussion. Status After Intervention:  Improved    Participation Level:  Active Listener    Participation Quality: Appropriate and Attentive      Speech:  normal      Thought Process/Content: Logical      Affective Functioning: Congruent      Mood: anxious      Level of consciousness:  Alert and Oriented x4      Response to Learning: Able to verbalize current knowledge/experience      Endings: None Reported    Modes of Intervention: Education, Support, Socialization, Exploration, Clarifying and Problem-solving      Discipline Responsible: /Counselor      Signature:  YNES Morales LSW

## 2022-04-02 NOTE — PROGRESS NOTES
Attended evening relaxation group. Able to identify ways to enhance relaxation and self care. Was 1 of 13 in attendance.

## 2022-04-02 NOTE — PROGRESS NOTES
[facility]  Day 3 Interdisciplinary Treatment Plan NOTE    Review Date & Time: 4/1/22 0900    Patient was in treatment team    Estimated Length of Stay Update:  3-5 days  Estimated Discharge Date Update: 3-5 days    EDUCATION:   Learner Progress Toward Treatment Goals: Reviewed results and recommendations of this team    Method: Small group    Outcome: Verbalized understanding    PATIENT GOALS:  Attend more groups    PLAN/TREATMENT RECOMMENDATIONS UPDATE: continue current treatment plan and monitor    GOALS UPDATE:   Time frame for Short-Term Goals: 3-5 days      Zunilda Daley RN

## 2022-04-02 NOTE — PLAN OF CARE
Problem: Altered Mood, Deterioration in Function:  Goal: Ability to perform activities of daily living will improve  Description: Ability to perform activities of daily living will improve  Outcome: Ongoing     Problem: Altered Mood, Deterioration in Function:  Goal: Skin appearance normal  Description: Skin appearance normal  Outcome: Ongoing     Problem: Altered Mood, Deterioration in Function:  Goal: Maintenance of adequate nutrition will improve  Description: Maintenance of adequate nutrition will improve  Outcome: Ongoing     Problem: Altered Mood, Deterioration in Function:  Goal: Participates in care planning  Description: Participates in care planning  Outcome: Ongoing     Problem: Altered Mood, Psychotic Behavior:  Goal: Able to demonstrate trust by eating, participating in treatment and following staff's direction  Description: Able to demonstrate trust by eating, participating in treatment and following staff's direction  Outcome: Ongoing       Pt has been up on the unit interacting with other patients. Pt continues to be psychotic believing that her Sikhism was doing richuals on her. Pt denies any thoughts of suicide or homicide at this time and no dangerous behavior is noted. Pt complaint of auditory hallucinations telling her she killed her mother. Pt is flat and blunted and appears to be internally stimulated.

## 2022-04-03 PROCEDURE — 6360000002 HC RX W HCPCS: Performed by: NURSE PRACTITIONER

## 2022-04-03 PROCEDURE — 6370000000 HC RX 637 (ALT 250 FOR IP): Performed by: NURSE PRACTITIONER

## 2022-04-03 PROCEDURE — 6370000000 HC RX 637 (ALT 250 FOR IP): Performed by: PSYCHIATRY & NEUROLOGY

## 2022-04-03 PROCEDURE — 99232 SBSQ HOSP IP/OBS MODERATE 35: CPT | Performed by: NURSE PRACTITIONER

## 2022-04-03 PROCEDURE — 94640 AIRWAY INHALATION TREATMENT: CPT

## 2022-04-03 PROCEDURE — 1240000000 HC EMOTIONAL WELLNESS R&B

## 2022-04-03 RX ORDER — OXCARBAZEPINE 150 MG/1
150 TABLET, FILM COATED ORAL 2 TIMES DAILY
Status: DISCONTINUED | OUTPATIENT
Start: 2022-04-03 | End: 2022-04-05 | Stop reason: HOSPADM

## 2022-04-03 RX ORDER — RISPERIDONE 0.5 MG/1
1 TABLET, ORALLY DISINTEGRATING ORAL DAILY
Status: DISCONTINUED | OUTPATIENT
Start: 2022-04-04 | End: 2022-04-05 | Stop reason: HOSPADM

## 2022-04-03 RX ORDER — RISPERIDONE 2 MG/1
2 TABLET, ORALLY DISINTEGRATING ORAL NIGHTLY
Status: DISCONTINUED | OUTPATIENT
Start: 2022-04-03 | End: 2022-04-05 | Stop reason: HOSPADM

## 2022-04-03 RX ADMIN — RISPERIDONE 2 MG: 2 TABLET, ORALLY DISINTEGRATING ORAL at 09:48

## 2022-04-03 RX ADMIN — OXCARBAZEPINE 150 MG: 150 TABLET, FILM COATED ORAL at 09:48

## 2022-04-03 RX ADMIN — BUDESONIDE 500 MCG: 0.5 SUSPENSION RESPIRATORY (INHALATION) at 08:21

## 2022-04-03 RX ADMIN — Medication 3 MG: at 21:08

## 2022-04-03 RX ADMIN — BUPRENORPHINE HYDROCHLORIDE AND NALOXONE HYDROCHLORIDE DIHYDRATE 1 TABLET: 8; 2 TABLET SUBLINGUAL at 21:07

## 2022-04-03 RX ADMIN — RISPERIDONE 2 MG: 2 TABLET, ORALLY DISINTEGRATING ORAL at 21:07

## 2022-04-03 RX ADMIN — OXCARBAZEPINE 150 MG: 150 TABLET, FILM COATED ORAL at 21:07

## 2022-04-03 RX ADMIN — ARFORMOTEROL TARTRATE 15 MCG: 15 SOLUTION RESPIRATORY (INHALATION) at 08:21

## 2022-04-03 RX ADMIN — BUPRENORPHINE HYDROCHLORIDE AND NALOXONE HYDROCHLORIDE DIHYDRATE 1 TABLET: 8; 2 TABLET SUBLINGUAL at 09:49

## 2022-04-03 ASSESSMENT — PAIN SCALES - GENERAL: PAINLEVEL_OUTOF10: 0

## 2022-04-03 NOTE — PROGRESS NOTES
Attended evening relaxation group. Pleasant and social, enjoying benefits of aromatherapy. Was 1 of 14 in attendance.

## 2022-04-03 NOTE — GROUP NOTE
Group Therapy Note    Date: 4/3/2022    Group Start Time: 1100  Group End Time: 1140  Group Topic: Cognitive Skills    [facility]    SUSAN Cadet        Group Therapy Note    Attendees: 16         Patient's Goal:  Pt will learn to recognize cognitive distortions and challenge them in their daily life. Notes: Pt was an active participant in group discussion. Status After Intervention:  Unchanged    Participation Level: Active Listener and Minimal    Participation Quality: Appropriate, Attentive and Resistant      Speech: minimal      Thought Process/Content: Linear      Affective Functioning: Blunted      Mood: anxious and depressed      Level of consciousness:  Alert, Oriented x4 and Attentive      Response to Learning: Resistant      Endings: None Reported    Modes of Intervention: Education, Support, Socialization, Exploration, Clarifying and Problem-solving      Discipline Responsible: /Counselor      Signature:   Aissatou kwan, [facility]

## 2022-04-03 NOTE — BH NOTE
Pt denies suicidal / homicidal ideations. Pt denies hallucinations. Pt is cooperative, flat affect, monotone in speech. Pt is without other behavioral concerns. Will follow and monitor.

## 2022-04-03 NOTE — PLAN OF CARE
Fannie lockett particp during the pm group offering on the lives of [place] and [address]. Attentive to a/v materials presented . Superior scores on the written projects. Although harinder sl flat affect, she appeared to digest key learning points on perseverance in hardship and forgiveness.

## 2022-04-03 NOTE — PLAN OF CARE
Problem: Altered Mood, Deterioration in Function:  Goal: Ability to perform activities of daily living will improve  Description: Ability to perform activities of daily living will improve  4/2/2022 2125 by Damian Guerrero RN  Outcome: Ongoing     Problem: Altered Mood, Deterioration in Function:  Goal: Skin appearance normal  Description: Skin appearance normal  Outcome: Ongoing     Problem: Altered Mood, Deterioration in Function:  Goal: Ability to tolerate increased activity will improve  Description: Ability to tolerate increased activity will improve  4/2/2022 2125 by Damian Guerrero RN  Outcome: Ongoing     Problem: Altered Mood, Deterioration in Function:  Goal: Participates in care planning  Description: Participates in care planning  Outcome: Ongoing     Problem: Altered Mood, Psychotic Behavior:  Goal: Able to verbalize decrease in frequency and intensity of hallucinations  Description: Able to verbalize decrease in frequency and intensity of hallucinations  Outcome: Ongoing       Pt is flat and blunt, calm and cooperative with locked eye contact. Pt denies any thoughts of suicide or homicide at this time and no dangerous behavior is noted. Pt denies visual or auditory hallucinations and no delusional thinking is noted.

## 2022-04-03 NOTE — GROUP NOTE
Group Therapy Note    Date: 4/3/2022    Group Start Time: 1010  Group End Time: 1050  Group Topic: Psychoeducation    [facility] [address]        Group Therapy Note    Attendees: 15       Notes: Active and engaged during discussion on community resources. Aware of local resources to continue positive progress. Status After Intervention:  Improved    Participation Level:  Active Listener and Interactive    Participation Quality: Appropriate, Attentive, Sharing and Supportive      Speech:  normal      Thought Process/Content: Logical      Affective Functioning: Congruent      Mood: euthymic      Level of consciousness:  Alert and Attentive      Response to Learning: Capable of insight, Able to change behavior and Progressing to goal      Endings: None Reported    Modes of Intervention: Education, Support, Socialization and Exploration      Discipline Responsible: Psychoeducational Specialist      Signature:  Rusty Pool, [address]

## 2022-04-03 NOTE — PLAN OF CARE
Pt is stable, flat affect, some poverty of content. Pt is isolative on unit, works on puzzles. Pt denies suicidal / homicidal ideations. Pt denies hallucinations. Pt does not report a goal during first morning assessment. Pt is without immediate distress. Will follow and monitor.

## 2022-04-04 PROCEDURE — 6360000002 HC RX W HCPCS: Performed by: NURSE PRACTITIONER

## 2022-04-04 PROCEDURE — 99232 SBSQ HOSP IP/OBS MODERATE 35: CPT | Performed by: NURSE PRACTITIONER

## 2022-04-04 PROCEDURE — 1240000000 HC EMOTIONAL WELLNESS R&B

## 2022-04-04 PROCEDURE — 6370000000 HC RX 637 (ALT 250 FOR IP): Performed by: PSYCHIATRY & NEUROLOGY

## 2022-04-04 PROCEDURE — 94640 AIRWAY INHALATION TREATMENT: CPT

## 2022-04-04 PROCEDURE — 6370000000 HC RX 637 (ALT 250 FOR IP): Performed by: NURSE PRACTITIONER

## 2022-04-04 RX ADMIN — BUDESONIDE 500 MCG: 0.5 SUSPENSION RESPIRATORY (INHALATION) at 10:40

## 2022-04-04 RX ADMIN — OXCARBAZEPINE 150 MG: 150 TABLET, FILM COATED ORAL at 09:39

## 2022-04-04 RX ADMIN — RISPERIDONE 1 MG: 0.5 TABLET, ORALLY DISINTEGRATING ORAL at 09:38

## 2022-04-04 RX ADMIN — BUPRENORPHINE HYDROCHLORIDE AND NALOXONE HYDROCHLORIDE DIHYDRATE 1 TABLET: 8; 2 TABLET SUBLINGUAL at 21:35

## 2022-04-04 RX ADMIN — Medication 3 MG: at 21:35

## 2022-04-04 RX ADMIN — RISPERIDONE 2 MG: 2 TABLET, ORALLY DISINTEGRATING ORAL at 21:36

## 2022-04-04 RX ADMIN — OXCARBAZEPINE 150 MG: 150 TABLET, FILM COATED ORAL at 21:35

## 2022-04-04 RX ADMIN — BUPRENORPHINE HYDROCHLORIDE AND NALOXONE HYDROCHLORIDE DIHYDRATE 1 TABLET: 8; 2 TABLET SUBLINGUAL at 09:38

## 2022-04-04 RX ADMIN — ARFORMOTEROL TARTRATE 15 MCG: 15 SOLUTION RESPIRATORY (INHALATION) at 10:40

## 2022-04-04 NOTE — PLAN OF CARE
Problem: Altered Mood, Deterioration in Function:  Goal: Ability to perform activities of daily living will improve  Description: Ability to perform activities of daily living will improve  4/3/2022 2046 by Michelle Ledesma RN  Outcome: Ongoing     Problem: Altered Mood, Deterioration in Function:  Goal: Skin appearance normal  Description: Skin appearance normal  Outcome: Ongoing     Problem: Altered Mood, Deterioration in Function:  Goal: Maintenance of adequate nutrition will improve  Description: Maintenance of adequate nutrition will improve  Outcome: Ongoing     Problem: Altered Mood, Deterioration in Function:  Goal: Ability to tolerate increased activity will improve  Description: Ability to tolerate increased activity will improve  Outcome: Ongoing     Problem: Altered Mood, Psychotic Behavior:  Goal: Able to demonstrate trust by eating, participating in treatment and following staff's direction  Description: Able to demonstrate trust by eating, participating in treatment and following staff's direction  4/3/2022 2046 by Michelle Ledesma RN  Outcome: Ongoing   Pt is up on the unit. Pt is withdrawn to herself, calm and cooperative. Pt denies any thoughts of suicide or homicide and no dangerous behavior is noted. Pt denies any visual or auditory hallucinations and no delusional thinking is noted.

## 2022-04-04 NOTE — PLAN OF CARE
Patient A&Ox4. Pt denies SI, HI, hallucinations, anxiety and depression. Patient is pleasant and cooperative with a blunted affect that brightens with conversation. Pt is visible on the unit, attends group and is social with staff and peers. Pt is med. & meal compliant. Pt with even and unlabored breathing, no signs of acute physical distress noted. Will continue to monitor and offer support as needed.     Problem: Altered Mood, Deterioration in Function:  Goal: Maintenance of adequate nutrition will improve  Description: Maintenance of adequate nutrition will improve  Outcome: Met This Shift     Problem: Altered Mood, Deterioration in Function:  Goal: Ability to tolerate increased activity will improve  Description: Ability to tolerate increased activity will improve  Outcome: Met This Shift     Problem: Altered Mood, Psychotic Behavior:  Goal: Absence of self-harm  Description: Absence of self-harm  Outcome: Met This Shift

## 2022-04-04 NOTE — GROUP NOTE
Group Therapy Note    Date: 4/4/2022    Group Start Time: 1100  Group End Time: 1130  Group Topic: Cognitive Skills    [facility]    YNES Coley LSW        Group Therapy Note    Attendees: 18         Patient's Goal: To participate in group discussion on active listening and I statements. Notes: pt was an active participant in group discussion. Status After Intervention:  Unchanged    Participation Level:  Active Listener    Participation Quality: Attentive      Speech:  normal      Thought Process/Content: Linear      Affective Functioning: Congruent      Mood: anxious      Level of consciousness:  Alert and Oriented x4      Response to Learning: Able to verbalize current knowledge/experience      Endings: None Reported    Modes of Intervention: Education, Support, Socialization, Exploration, Clarifying and Problem-solving      Discipline Responsible: /Counselor      Signature:  YNES Irizarry LSW

## 2022-04-04 NOTE — PROGRESS NOTES
BEHAVIORAL HEALTH FOLLOW-UP NOTE     4/4/2022     Patient was seen and examined in person, Chart reviewed   Patient's case discussed with staff/team    Chief Complaint: \"Im feeling a lot better\"    Interim History: Seen sitting up on the unit she is reading a book. She states she is feeling much better she is much less drowsy her affect is brighter she is smiling and pleasant family denies SI/HI intent or plan denies auditory visual hallucinations she has been attending group she is been social select peers she is not interested in long-acting injection states that she has taken in the past gained weight and she would rather just continue with oral medications. She states her stepmother is supportive of her.   There are no overt or covert signs psychosis she is eating well sleeping well no neurovegetative signs symptoms of depression      Appetite:   [x] Normal/Unchanged  [] Increased  [] Decreased      Sleep:       [x] Normal/Unchanged  [] Fair       [] Poor              Energy:    [x] Normal/Unchanged  [] Increased  [] Decreased        SI [] Present  [x] Absent    HI  []Present  [x] Absent     Aggression:  [] yes  [x] no    Patient is [x] able  [] unable to CONTRACT FOR SAFETY     PAST MEDICAL/PSYCHIATRIC HISTORY:   Past Medical History:   Diagnosis Date    Concussion with loss of consciousness of 30 minutes or less 6/30/2018    Herpes genitalia     [facility]     Seizures ([facility]     Ulcer        FAMILY/SOCIAL HISTORY:  Family History   Problem Relation Age of Onset    Diabetes Mother     Diabetes Father     Other Brother      Social History     Socioeconomic History    Marital status: Single     Spouse name: Not on file    Number of children: 1    Years of education: 8    Highest education level: 10th grade   Occupational History    Not on file   Tobacco Use    Smoking status: Current Some Day Smoker     Types: Cigarettes    Smokeless tobacco: Never Used    Tobacco comment: Started smoking again after mom passed away  2021   Vaping Use    Vaping Use: Never used   Substance and Sexual Activity    Alcohol use: Yes     Comment: occasionally-holidays    Drug use: No    Sexual activity: Yes     Partners: Male   Other Topics Concern    Not on file   Social History Narrative    2/24/2021-Stress-pt reports that she is very much stressed-pt recently lost her mother and she has been having a hard time dealing with her loss. Pt is currently receiving grief counseling through [place] [address] Determinants of Health     Financial Resource Strain:     Difficulty of Paying Living Expenses: Not on file   Food Insecurity:     Worried About Running Out of Food in the Last Year: Not on file    Suzanne of Food in the Last Year: Not on file   Transportation Needs:     Lack of Transportation (Medical): Not on file    Lack of Transportation (Non-Medical): Not on file   Physical Activity:     Days of Exercise per Week: Not on file    Minutes of Exercise per Session: Not on file   Stress:     Feeling of Stress : Not on file   Social Connections:     Frequency of Communication with Friends and Family: Not on file    Frequency of Social Gatherings with Friends and Family: Not on file    Attends Roman [employer] Services: Not on file    Active Member of [address] [place] [address] [employer] or Organizations: Not on file    Attends Club or Organization Meetings: Not on file    Marital Status: Not on file   Intimate Partner Violence:     Fear of Current or Ex-Partner: Not on file    Emotionally Abused: Not on file    Physically Abused: Not on file    Sexually Abused: Not on file   Housing Stability:     Unable to Pay for Housing in the Last Year: Not on file    Number of [place] in the Last Year: Not on file    Unstable Housing in the Last Year: Not on file           ROS:  [x] All negative/unchanged except if checked.  Explain positive(checked items) below:  [] Constitutional  [] Eyes  [] Ear/Nose/Mouth/Throat  [] Respiratory  [] CV  [] GI  []   [] Musculoskeletal  [] Skin/Breast  [] Neurological  [] Endocrine  [] Heme/Lymph  [] Allergic/Immunologic    Explanation:     MEDICATIONS:    Current Facility-Administered Medications:     OXcarbazepine (TRILEPTAL) tablet 150 mg, 150 mg, Oral, BID, Charyl Bamberger Dellick, APRN - CNP, 399 mg at 04/04/22 0360    risperiDONE (RISPERDAL M-TABS) disintegrating tablet 1 mg, 1 mg, Oral, Daily, Giovana Garcia APRN - CNP, 1 mg at 04/04/22 0173    risperiDONE (RISPERDAL M-TABS) disintegrating tablet 2 mg, 2 mg, Oral, Nightly, AKHIL Davila - CNP, 2 mg at 04/03/22 2107    acetaminophen (TYLENOL) tablet 650 mg, 650 mg, Oral, Q6H PRN, Hoda Miranda MD    magnesium hydroxide (MILK OF MAGNESIA) 400 MG/5ML suspension 30 mL, 30 mL, Oral, Daily PRN, Hoda Miranda MD    nicotine (NICODERM CQ) 21 MG/24HR 1 patch, 1 patch, TransDERmal, Daily, Hoda Miranda MD, 1 patch at 04/04/22 3400    aluminum & magnesium hydroxide-simethicone (MAALOX) 200-200-20 MG/5ML suspension 30 mL, 30 mL, Oral, PRN, Hoda Miranda MD    hydrOXYzine (VISTARIL) capsule 50 mg, 50 mg, Oral, TID PRN, Hoda Miranda MD, 50 mg at 03/31/22 2307    haloperidol (HALDOL) tablet 5 mg, 5 mg, Oral, Q6H PRN **OR** haloperidol lactate (HALDOL) injection 5 mg, 5 mg, IntraMUSCular, Q6H PRN, Hoda Miranda MD    melatonin tablet 3 mg, 3 mg, Oral, Nightly, Hoda Miranda MD, 3 mg at 04/03/22 2108    buprenorphine-naloxone (SUBOXONE) 8-2 MG SL tablet 1 tablet, 1 tablet, SubLINGual, BID, Charyl Bamberger Dellick, APRN - CNP, 1 tablet at 04/04/22 5318    albuterol (PROVENTIL) nebulizer solution 2.5 mg, 2.5 mg, Nebulization, Q3E PRN, Charyl Bamberger Dellick, APRN - CNP    budesonide (PULMICORT) nebulizer suspension 500 mcg, 0.5 mg, Nebulization, BID, Charyl Bamberger Dellick, APRN - CNP, 088 mcg at 04/04/22 1040    Arformoterol Tartrate (BROVANA) nebulizer solution 15 mcg, 15 mcg, Nebulization, BID, Giovana Garcia, APRN - CNP, 15 mcg at 04/04/22 1040      Examination:  /76   Pulse 99   Temp 99.5 °F (37.5 °C) (Temporal)   Resp 16   Ht 5' 5\" (1.651 m)   Wt 165 lb (74.8 kg)   LMP 02/06/2022 (Approximate)   SpO2 98%   BMI 27.46 kg/m²   Gait - steady  Medication side effects(SE): Denies    Mental Status Examination:    Level of consciousness:  within normal limits   Appearance:  fair grooming and fair hygiene  Behavior/Motor:  no abnormalities noted  Attitude toward examiner:  cooperative  Speech: Normal rate rhythm and tone not offer much conversation  Mood: \"I am okay. \"  Affect: Flat and blunted  Thought processes:  linear   Thought content: Devoid any auditory visual loose [place] delusions or the perceptual normalities. Denies SI/HI intent or plan cognition:  oriented to person, place, and time   Concentration intact  Insight improving  Judgement improving    ASSESSMENT:  Patient symptoms are:  [] Well controlled  [x] Improving  [] Worsening  [] No change      Diagnosis:  Principal Problem:    [facility] ([facility]  Active Problems:    Cannabis abuse  Resolved Problems:    * No resolved hospital problems. *      LABS:    No results for input(s): WBC, HGB, PLT in the last 72 hours. No results for input(s): NA, K, CL, CO2, BUN, CREATININE, GLUCOSE in the last 72 hours. No results for input(s): BILITOT, ALKPHOS, AST, ALT in the last 72 hours.   Lab Results   Component Value Date    LABAMPH NOT DETECTED 03/29/2022    [address] NOT DETECTED 07/30/2011    BARBSCNU NOT DETECTED 03/29/2022    LABBENZ NOT DETECTED 03/29/2022    LABBENZ SEE BELOW 08/29/2014    CANNAB POSITIVE  07/30/2011    LABMETH NOT DETECTED 03/29/2022    OPIATESCREENURINE NOT DETECTED 03/29/2022    PHENCYCLIDINESCREENURINE NOT DETECTED 03/29/2022    ETOH <10 03/29/2022     Lab Results   Component Value Date    TSH 0.899 07/14/2020     No results found for: LITHIUM  No results found for: VALPROATE, CBMZ      Treatment Plan:  Reviewed current Medications with the patient. Risks, benefits, side effects, drug-to-drug interactions and alternatives to treatment were discussed. Collateral information:   CD evaluation  Encourage patient to attend group and other milieu activities.   Discharge planning discussed with the patient and treatment team.    We will decrease Risperdal to 1 mg daily and 2 mg at bedtime and start Trileptal 150 mg twice daily patient is very drowsy and nodding off while sitting up on these medications  Continue her Suboxone 8/2 twice daily    PSYCHOTHERAPY/COUNSELING:  [x] Therapeutic interview  [x] Supportive  [] CBT  [] Ongoing  [] Other    [x] Patient continues to need, on a daily basis, active treatment furnished directly by or requiring the supervision of inpatient psychiatric personnel      Anticipated Length of stay: 3 to 7 days based on stability            Electronically signed by AKHIL Keyes CNP on 8/6/0785 at 3:50 PM

## 2022-04-04 NOTE — CARE COORDINATION
Sw spoke with pt. Pt reports feeling better today, denied SI/HI/AVH. Pt feels she is ready for discharge and is hopeful to discharge soon. Pt stated she needs to get back to work to pay for her bills. Pt still plans to return to her apartment and follow up with [facility]. Pt cooperative, blunt, flat, eye contact good, speech clear, improving insight/judgement.

## 2022-04-04 NOTE — PROGRESS NOTES
Pt attended afternoon meet and greet. Updated on staff changes and evening flow,   Participated in chicken soup for the soul game. Pt was 1 out of 9 in attendance.

## 2022-04-04 NOTE — PROGRESS NOTES
Attended community meeting. Pt aware of staff and group schedule.    Shared goal for the day as \"Stay positive\"

## 2022-04-05 VITALS
TEMPERATURE: 96.5 F | HEART RATE: 85 BPM | RESPIRATION RATE: 16 BRPM | WEIGHT: 165 LBS | HEIGHT: 65 IN | BODY MASS INDEX: 27.49 KG/M2 | SYSTOLIC BLOOD PRESSURE: 120 MMHG | DIASTOLIC BLOOD PRESSURE: 71 MMHG | OXYGEN SATURATION: 98 %

## 2022-04-05 PROCEDURE — 94640 AIRWAY INHALATION TREATMENT: CPT

## 2022-04-05 PROCEDURE — 6360000002 HC RX W HCPCS: Performed by: NURSE PRACTITIONER

## 2022-04-05 PROCEDURE — 6370000000 HC RX 637 (ALT 250 FOR IP): Performed by: PSYCHIATRY & NEUROLOGY

## 2022-04-05 PROCEDURE — 6370000000 HC RX 637 (ALT 250 FOR IP): Performed by: NURSE PRACTITIONER

## 2022-04-05 PROCEDURE — 99239 HOSP IP/OBS DSCHRG MGMT >30: CPT | Performed by: NURSE PRACTITIONER

## 2022-04-05 RX ORDER — NICOTINE 21 MG/24HR
1 PATCH, TRANSDERMAL 24 HOURS TRANSDERMAL DAILY
Qty: 30 PATCH | Refills: 0
Start: 2022-04-05 | End: 2022-05-05

## 2022-04-05 RX ORDER — OXCARBAZEPINE 150 MG/1
150 TABLET, FILM COATED ORAL 2 TIMES DAILY
Qty: 60 TABLET | Refills: 0 | Status: ON HOLD | OUTPATIENT
Start: 2022-04-05 | End: 2022-04-26 | Stop reason: HOSPADM

## 2022-04-05 RX ORDER — RISPERIDONE 2 MG/1
2 TABLET, ORALLY DISINTEGRATING ORAL NIGHTLY
Qty: 30 TABLET | Refills: 0 | Status: ON HOLD | OUTPATIENT
Start: 2022-04-05 | End: 2022-04-26 | Stop reason: HOSPADM

## 2022-04-05 RX ORDER — RISPERIDONE 1 MG/1
1 TABLET, ORALLY DISINTEGRATING ORAL DAILY
Qty: 30 TABLET | Refills: 0 | Status: ON HOLD | OUTPATIENT
Start: 2022-04-05 | End: 2022-04-26 | Stop reason: HOSPADM

## 2022-04-05 RX ORDER — LANOLIN ALCOHOL/MO/W.PET/CERES
3 CREAM (GRAM) TOPICAL NIGHTLY
Qty: 30 TABLET | Refills: 0 | Status: SHIPPED | OUTPATIENT
Start: 2022-04-05 | End: 2022-05-05

## 2022-04-05 RX ADMIN — BUDESONIDE 500 MCG: 0.5 SUSPENSION RESPIRATORY (INHALATION) at 09:00

## 2022-04-05 RX ADMIN — OXCARBAZEPINE 150 MG: 150 TABLET, FILM COATED ORAL at 09:15

## 2022-04-05 RX ADMIN — RISPERIDONE 1 MG: 0.5 TABLET, ORALLY DISINTEGRATING ORAL at 09:15

## 2022-04-05 RX ADMIN — ARFORMOTEROL TARTRATE 15 MCG: 15 SOLUTION RESPIRATORY (INHALATION) at 09:00

## 2022-04-05 RX ADMIN — BUPRENORPHINE HYDROCHLORIDE AND NALOXONE HYDROCHLORIDE DIHYDRATE 1 TABLET: 8; 2 TABLET SUBLINGUAL at 09:15

## 2022-04-05 NOTE — PROGRESS NOTES
[facility]  Discharge Note    Pt discharged with followings belongings:   Dental Appliances: None  Vision - Corrective Lenses: None  Hearing Aid: None  Jewelry: None  Body Piercings Removed: N/A  Clothing: William Long / coat,Pants,Shirt,Socks,Undergarments (Comment)  Were All Patient Medications Collected?: Yes (Secured in med room in labled bag.)  Other Valuables: Cell phone   Valuables sent home with patient or returned to patient. Patient education on aftercare instructions: yes. Patient verbalize understanding of AVS:  yes. Status EXAM upon discharge:  Status and Exam  Normal: Yes  Facial Expression: Brightened  Affect: Appropriate  Level of Consciousness: Alert  Mood:Normal: No  Mood: Ambivalent  Motor Activity:Normal: Yes  Interview Behavior: Cooperative  Preception: [place] to Person,[place] to Time,[place] to Place,[place] to Situation  Attention:Normal: No  Attention: Distractible  Thought Processes: Circumstantial  Thought Content:Normal: No  Thought Content: Preoccupations  Hallucinations: None  Delusions: No  Delusions:  Other(See Comment)  Memory:Normal: Yes  Memory: Poor Recent  Insight and Judgment: No  Insight and Judgment: Poor Insight  Present Suicidal Ideation: No  Present Homicidal Ideation: No      Metabolic Screening:    Lab Results   Component Value Date    LABA1C 5.3 03/30/2022       Lab Results   Component Value Date    CHOL 183 03/29/2022    CHOL 221 (H) 07/14/2020    CHOL 190 08/11/2016    CHOL 186 07/27/2015     Lab Results   Component Value Date    TRIG 55 03/29/2022    TRIG 67 07/14/2020    TRIG 103 08/11/2016    TRIG 83 07/27/2015     Lab Results   Component Value Date    HDL 65 03/29/2022    HDL 74 07/14/2020    HDL 41 08/11/2016    HDL 51 07/27/2015     No components found for: [facility] [place]  Lab Results   Component Value Date    LABVLDL 11 03/29/2022    LABVLDL 13 07/14/2020    LABVLDL 21 08/11/2016    LABVLDL 17 07/27/2015       Anayeli Paredes RN

## 2022-04-05 NOTE — PROGRESS NOTES
Patient denies SI,HI, or any Hallucinations at this time. She is calm and cooperative on the unit. No issues. Will continue to monitor.

## 2022-04-05 NOTE — GROUP NOTE
Group Therapy Note    Date: 4/5/2022    Group Start Time: 1100  Group End Time: 1150  Group Topic: Psychotherapy    [facility]    YNES Coley LSW        Group Therapy Note    Attendees: 8         Patient's Goal: To increase social interaction and improve relationships with others    Notes:  Pt was attentive in group and was able to identify an agenda. She was also able to verbalize relating to others within the group. Status After Intervention:  Improved    Participation Level:  Active Listener and Interactive    Participation Quality: Appropriate, Attentive, Sharing and Supportive      Speech:  normal      Thought Process/Content: Logical      Affective Functioning: Congruent      Mood: anxious      Level of consciousness:  Alert and Oriented x4      Response to Learning: Able to verbalize current knowledge/experience      Endings: None Reported    Modes of Intervention: Support, Socialization and Exploration      Discipline Responsible: /Counselor      Signature:  YNES Duran LSW

## 2022-04-05 NOTE — PROGRESS NOTES
CLINICAL PHARMACY NOTE: MEDS TO BEDS    Total # of Prescriptions Filled: 4   The following medications were delivered to the patient:  · RISPERIDONE 2 ODT  · OXCARBAZEPINE 150  · RISPERIDONE 1 ODT  · MELATONIN 3    Additional Documentation:

## 2022-04-05 NOTE — PLAN OF CARE
Problem: Altered Mood, Deterioration in Function:  Goal: Ability to perform activities of daily living will improve  Description: Ability to perform activities of daily living will improve  Outcome: Completed  Goal: Able to verbalize reality based thinking  Description: Able to verbalize reality based thinking  Outcome: Completed  Goal: Skin appearance normal  Description: Skin appearance normal  Outcome: Completed  Goal: Maintenance of adequate nutrition will improve  Description: Maintenance of adequate nutrition will improve  Outcome: Completed  Goal: Ability to tolerate increased activity will improve  Description: Ability to tolerate increased activity will improve  Outcome: Completed  Goal: Participates in care planning  Description: Participates in care planning  Outcome: Completed     Problem: Altered Mood, Psychotic Behavior:  Goal: Able to demonstrate trust by eating, participating in treatment and following staff's direction  Description: Able to demonstrate trust by eating, participating in treatment and following staff's direction  Outcome: Completed  Goal: Able to verbalize decrease in frequency and intensity of hallucinations  Description: Able to verbalize decrease in frequency and intensity of hallucinations  Outcome: Completed  Goal: Able to verbalize reality based thinking  Description: Able to verbalize reality based thinking  Outcome: Completed  Goal: Absence of self-harm  Description: Absence of self-harm  Outcome: Completed

## 2022-04-05 NOTE — GROUP NOTE
Group Therapy Note    Date: 4/5/2022    Group Start Time: 1410  Group End Time: 1450  Group Topic: Recovery    [facility]    Ellie Tipton, CTRS        Group Therapy Note      Number of participants: 12  Type of group: Recovery  Mode of intervention: Education and Support  Topic: Peer Recovery          Notes: Pt attended peer recovery group and shared when appropriate. Gave support to others. Status After Intervention:  Improved    Participation Level:  Active Listener and Interactive    Participation Quality: Appropriate, Attentive, Sharing and Supportive      Speech:  normal      Thought Process/Content: Logical      Affective Functioning: Congruent      Mood: euthymic      Level of consciousness:  Alert, Oriented x4 and Attentive      Response to Learning: Able to verbalize current knowledge/experience, Able to verbalize/acknowledge new learning, Able to retain information, Capable of insight, Able to change behavior and Progressing to goal      Endings: None Reported    Modes of Intervention: Education and Support

## 2022-04-05 NOTE — PLAN OF CARE
Problem: Altered Mood, Deterioration in Function:  Goal: Ability to perform activities of daily living will improve  Description: Ability to perform activities of daily living will improve  Outcome: Ongoing     Problem: Altered Mood, Deterioration in Function:  Goal: Skin appearance normal  Description: Skin appearance normal  Outcome: Ongoing     Problem: Altered Mood, Deterioration in Function:  Goal: Maintenance of adequate nutrition will improve  Description: Maintenance of adequate nutrition will improve  4/4/2022 2043 by Rachell Lyons RN  Outcome: Ongoing     Problem: Altered Mood, Deterioration in Function:  Goal: Ability to tolerate increased activity will improve  Description: Ability to tolerate increased activity will improve  4/4/2022 2043 by Rachell Lyons RN  Outcome: Ongoing     Problem: Altered Mood, Psychotic Behavior:  Goal: Able to demonstrate trust by eating, participating in treatment and following staff's direction  Description: Able to demonstrate trust by eating, participating in treatment and following staff's direction  Outcome: Ongoing     Pt is up on the unit with other patients interacting well with select others. Pt is alert and oriented x 3, calm and cooperative with good eye contact. Pt denies any thoughts of suicide or homicide at this time and no dangerous behavior is noted. Pt denies any visual or auditory hallucinations and no delusional thinking is noted.

## 2022-04-05 NOTE — CARE COORDINATION
Sw spoke with pt about her discharge. Pt reports feeling good today, denied SI/HI/AVH. Pt plans to stay with her step mom for a few days before returning home to her apartment. Collateral was done with pt step mom who expressed no concerns for discharge and confirmed she does not have access to any guns or other weapons. Pt is aware of her follow up appointments with [facility]. Pt is calm, cooperative, pleasant, good eye contact, clear speech, improved insight/judgement.      In order to ensure appropriate transition and discharge planning is in place, the following documents have been transmitted to [facility], as the new outpatient provider:     The d/c diagnosis was transmitted to the next care provider   The reason for hospitalization was transmitted to the next care provider   The d/c medications (dosage and indication) were transmitted to the next care provider    The continuing care plan was transmitted to the next care provider

## 2022-04-05 NOTE — CARE COORDINATION
Sadie contacted pt stepmother [facility]. Aminta has talked with pt daily and reports she is sounding much better. Aminta expressed no concerns for pt discharging when that day comes. She stated that pt told her the medications are working. Aminta would like to be contacted on day of discharge.

## 2022-04-05 NOTE — PLAN OF CARE
Problem: Altered Mood, Deterioration in Function:  Goal: Ability to perform activities of daily living will improve  Description: Ability to perform activities of daily living will improve  Outcome: Completed  Goal: Able to verbalize reality based thinking  Description: Able to verbalize reality based thinking  Outcome: Completed  Goal: Skin appearance normal  Description: Skin appearance normal  Outcome: Completed  Goal: Maintenance of adequate nutrition will improve  Description: Maintenance of adequate nutrition will improve  Outcome: Completed  Goal: Ability to tolerate increased activity will improve  Description: Ability to tolerate increased activity will improve  Outcome: Completed  Goal: Participates in care planning  Description: Participates in care planning  Outcome: Completed     Problem: Altered Mood, Psychotic Behavior:  Goal: Able to demonstrate trust by eating, participating in treatment and following staff's direction  Description: Able to demonstrate trust by eating, participating in treatment and following staff's direction  Outcome: Completed  Goal: Able to verbalize decrease in frequency and intensity of hallucinations  Description: Able to verbalize decrease in frequency and intensity of hallucinations  Outcome: Completed  Goal: Able to verbalize reality based thinking  Description: Able to verbalize reality based thinking  Outcome: Completed  Goal: Absence of self-harm  Description: Absence of self-harm  Outcome: Completed     Problem: Altered Mood, Deterioration in Function:  Goal: Ability to perform activities of daily living will improve  Description: Ability to perform activities of daily living will improve  Outcome: Completed  Goal: Able to verbalize reality based thinking  Description: Able to verbalize reality based thinking  Outcome: Completed  Goal: Skin appearance normal  Description: Skin appearance normal  Outcome: Completed  Goal: Maintenance of adequate nutrition will improve  Description: Maintenance of adequate nutrition will improve  Outcome: Completed  Goal: Ability to tolerate increased activity will improve  Description: Ability to tolerate increased activity will improve  Outcome: Completed  Goal: Participates in care planning  Description: Participates in care planning  Outcome: Completed

## 2022-04-05 NOTE — PROGRESS NOTES
Updated patients' mother [facility] regarding new Discharge order. Mother states she will be here about 2-3 pm today.
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
## 2022-04-18 ENCOUNTER — HOSPITAL ENCOUNTER (INPATIENT)
Age: 32
LOS: 7 days | Discharge: HOME | DRG: 750 | End: 2022-04-26
Attending: EMERGENCY MEDICINE | Admitting: PSYCHIATRY & NEUROLOGY
Payer: COMMERCIAL

## 2022-04-18 DIAGNOSIS — F20.9 SCHIZOPHRENIA, UNSPECIFIED TYPE (HCC): ICD-10-CM

## 2022-04-18 DIAGNOSIS — R44.0 AUDITORY HALLUCINATION: Primary | ICD-10-CM

## 2022-04-18 LAB
ACETAMINOPHEN LEVEL: <5 MCG/ML (ref 10–30)
ALBUMIN SERPL-MCNC: 4.3 G/DL (ref 3.5–5.2)
ALP BLD-CCNC: 58 U/L (ref 35–104)
ALT SERPL-CCNC: 19 U/L (ref 0–32)
AMPHETAMINE SCREEN, URINE: NOT DETECTED
ANION GAP SERPL CALCULATED.3IONS-SCNC: 15 MMOL/L (ref 7–16)
AST SERPL-CCNC: 17 U/L (ref 0–31)
BACTERIA: ABNORMAL /HPF
BARBITURATE SCREEN URINE: NOT DETECTED
BASOPHILS ABSOLUTE: 0.03 E9/L (ref 0–0.2)
BASOPHILS RELATIVE PERCENT: 0.5 % (ref 0–2)
BENZODIAZEPINE SCREEN, URINE: NOT DETECTED
BILIRUB SERPL-MCNC: 0.6 MG/DL (ref 0–1.2)
BILIRUBIN URINE: NEGATIVE
BLOOD, URINE: NORMAL
BUN BLDV-MCNC: 14 MG/DL (ref 6–20)
CALCIUM SERPL-MCNC: 9.4 MG/DL (ref 8.6–10.2)
CANNABINOID SCREEN URINE: NOT DETECTED
CHLORIDE BLD-SCNC: 99 MMOL/L (ref 98–107)
CLARITY: NORMAL
CO2: 21 MMOL/L (ref 22–29)
COCAINE METABOLITE SCREEN URINE: NOT DETECTED
COLOR: YELLOW
CREAT SERPL-MCNC: 0.7 MG/DL (ref 0.5–1)
EKG ATRIAL RATE: 98 BPM
EKG P AXIS: 31 DEGREES
EKG P-R INTERVAL: 136 MS
EKG Q-T INTERVAL: 356 MS
EKG QRS DURATION: 82 MS
EKG QTC CALCULATION (BAZETT): 454 MS
EKG R AXIS: 50 DEGREES
EKG T AXIS: 34 DEGREES
EKG VENTRICULAR RATE: 98 BPM
EOSINOPHILS ABSOLUTE: 0.16 E9/L (ref 0.05–0.5)
EOSINOPHILS RELATIVE PERCENT: 2.9 % (ref 0–6)
EPITHELIAL CELLS, UA: ABNORMAL /HPF
ETHANOL: 17 MG/DL (ref 0–0.08)
FENTANYL SCREEN, URINE: NOT DETECTED
GFR AFRICAN AMERICAN: >60
GFR NON-AFRICAN AMERICAN: >60 ML/MIN/1.73
GLUCOSE BLD-MCNC: 145 MG/DL (ref 74–99)
GLUCOSE URINE: NEGATIVE MG/DL
HCG, URINE, POC: NEGATIVE
HCT VFR BLD CALC: 39.3 % (ref 34–48)
HEMOGLOBIN: 13.2 G/DL (ref 11.5–15.5)
IMMATURE GRANULOCYTES #: 0.02 E9/L
IMMATURE GRANULOCYTES %: 0.4 % (ref 0–5)
INFLUENZA A: NOT DETECTED
INFLUENZA B: NOT DETECTED
KETONES, URINE: NEGATIVE MG/DL
LEUKOCYTE ESTERASE, URINE: NEGATIVE
LYMPHOCYTES ABSOLUTE: 1.32 E9/L (ref 1.5–4)
LYMPHOCYTES RELATIVE PERCENT: 23.6 % (ref 20–42)
Lab: NORMAL
Lab: NORMAL
MCH RBC QN AUTO: 30.3 PG (ref 26–35)
MCHC RBC AUTO-ENTMCNC: 33.6 % (ref 32–34.5)
MCV RBC AUTO: 90.1 FL (ref 80–99.9)
METHADONE SCREEN, URINE: NOT DETECTED
MONOCYTES ABSOLUTE: 0.36 E9/L (ref 0.1–0.95)
MONOCYTES RELATIVE PERCENT: 6.4 % (ref 2–12)
NEGATIVE QC PASS/FAIL: NORMAL
NEUTROPHILS ABSOLUTE: 3.7 E9/L (ref 1.8–7.3)
NEUTROPHILS RELATIVE PERCENT: 66.2 % (ref 43–80)
NITRITE, URINE: NEGATIVE
OPIATE SCREEN URINE: NOT DETECTED
OXYCODONE URINE: NOT DETECTED
PDW BLD-RTO: 13.1 FL (ref 11.5–15)
PH UA: 6 (ref 5–9)
PHENCYCLIDINE SCREEN URINE: NOT DETECTED
PLATELET # BLD: 252 E9/L (ref 130–450)
PMV BLD AUTO: 10.4 FL (ref 7–12)
POSITIVE QC PASS/FAIL: NORMAL
POTASSIUM SERPL-SCNC: 3.5 MMOL/L (ref 3.5–5)
PROTEIN UA: NEGATIVE MG/DL
RBC # BLD: 4.36 E12/L (ref 3.5–5.5)
RBC UA: ABNORMAL /HPF (ref 0–2)
SALICYLATE, SERUM: <0.3 MG/DL (ref 0–30)
SARS-COV-2 RNA, RT PCR: NOT DETECTED
SODIUM BLD-SCNC: 135 MMOL/L (ref 132–146)
SPECIFIC GRAVITY UA: 1.02 (ref 1–1.03)
TOTAL PROTEIN: 7.5 G/DL (ref 6.4–8.3)
TRICYCLIC ANTIDEPRESSANTS SCREEN SERUM: NEGATIVE NG/ML
UROBILINOGEN, URINE: 0.2 E.U./DL
WBC # BLD: 5.6 E9/L (ref 4.5–11.5)
WBC UA: ABNORMAL /HPF (ref 0–5)

## 2022-04-18 PROCEDURE — 6360000002 HC RX W HCPCS: Performed by: NURSE PRACTITIONER

## 2022-04-18 PROCEDURE — 80179 DRUG ASSAY SALICYLATE: CPT

## 2022-04-18 PROCEDURE — 80053 COMPREHEN METABOLIC PANEL: CPT

## 2022-04-18 PROCEDURE — 6370000000 HC RX 637 (ALT 250 FOR IP): Performed by: NURSE PRACTITIONER

## 2022-04-18 PROCEDURE — 81001 URINALYSIS AUTO W/SCOPE: CPT

## 2022-04-18 PROCEDURE — 99285 EMERGENCY DEPT VISIT HI MDM: CPT

## 2022-04-18 PROCEDURE — 80143 DRUG ASSAY ACETAMINOPHEN: CPT

## 2022-04-18 PROCEDURE — 80307 DRUG TEST PRSMV CHEM ANLYZR: CPT

## 2022-04-18 PROCEDURE — 82077 ASSAY SPEC XCP UR&BREATH IA: CPT

## 2022-04-18 PROCEDURE — 6370000000 HC RX 637 (ALT 250 FOR IP): Performed by: EMERGENCY MEDICINE

## 2022-04-18 PROCEDURE — 87636 SARSCOV2 & INF A&B AMP PRB: CPT

## 2022-04-18 PROCEDURE — 6360000002 HC RX W HCPCS: Performed by: EMERGENCY MEDICINE

## 2022-04-18 PROCEDURE — 85025 COMPLETE CBC W/AUTO DIFF WBC: CPT

## 2022-04-18 PROCEDURE — 96372 THER/PROPH/DIAG INJ SC/IM: CPT

## 2022-04-18 PROCEDURE — 93005 ELECTROCARDIOGRAM TRACING: CPT | Performed by: NURSE PRACTITIONER

## 2022-04-18 RX ORDER — BUPRENORPHINE HYDROCHLORIDE AND NALOXONE HYDROCHLORIDE DIHYDRATE 8; 2 MG/1; MG/1
1 TABLET SUBLINGUAL DAILY
Status: DISCONTINUED | OUTPATIENT
Start: 2022-04-18 | End: 2022-04-26 | Stop reason: HOSPADM

## 2022-04-18 RX ORDER — HALOPERIDOL 5 MG
5 TABLET ORAL EVERY 6 HOURS PRN
Status: DISCONTINUED | OUTPATIENT
Start: 2022-04-18 | End: 2022-04-21 | Stop reason: SDUPTHER

## 2022-04-18 RX ORDER — HALOPERIDOL 5 MG/ML
5 INJECTION INTRAMUSCULAR EVERY 6 HOURS PRN
Status: DISCONTINUED | OUTPATIENT
Start: 2022-04-18 | End: 2022-04-21 | Stop reason: SDUPTHER

## 2022-04-18 RX ORDER — CHLORDIAZEPOXIDE HYDROCHLORIDE 25 MG/1
25 CAPSULE, GELATIN COATED ORAL EVERY 6 HOURS PRN
Status: DISCONTINUED | OUTPATIENT
Start: 2022-04-18 | End: 2022-04-26 | Stop reason: HOSPADM

## 2022-04-18 RX ORDER — LANOLIN ALCOHOL/MO/W.PET/CERES
3 CREAM (GRAM) TOPICAL NIGHTLY PRN
Status: DISCONTINUED | OUTPATIENT
Start: 2022-04-18 | End: 2022-04-26 | Stop reason: HOSPADM

## 2022-04-18 RX ORDER — MAGNESIUM HYDROXIDE/ALUMINUM HYDROXICE/SIMETHICONE 120; 1200; 1200 MG/30ML; MG/30ML; MG/30ML
30 SUSPENSION ORAL PRN
Status: DISCONTINUED | OUTPATIENT
Start: 2022-04-18 | End: 2022-04-21 | Stop reason: SDUPTHER

## 2022-04-18 RX ORDER — LORAZEPAM 2 MG/ML
1 INJECTION INTRAMUSCULAR ONCE
Status: COMPLETED | OUTPATIENT
Start: 2022-04-18 | End: 2022-04-18

## 2022-04-18 RX ORDER — HALOPERIDOL 5 MG/ML
5 INJECTION INTRAMUSCULAR ONCE
Status: COMPLETED | OUTPATIENT
Start: 2022-04-18 | End: 2022-04-18

## 2022-04-18 RX ORDER — ACETAMINOPHEN 325 MG/1
650 TABLET ORAL EVERY 6 HOURS PRN
Status: DISCONTINUED | OUTPATIENT
Start: 2022-04-18 | End: 2022-04-21 | Stop reason: SDUPTHER

## 2022-04-18 RX ORDER — NICOTINE 21 MG/24HR
1 PATCH, TRANSDERMAL 24 HOURS TRANSDERMAL ONCE
Status: COMPLETED | OUTPATIENT
Start: 2022-04-18 | End: 2022-04-20

## 2022-04-18 RX ORDER — LANOLIN ALCOHOL/MO/W.PET/CERES
3 CREAM (GRAM) TOPICAL NIGHTLY
Status: DISCONTINUED | OUTPATIENT
Start: 2022-04-18 | End: 2022-04-21 | Stop reason: SDUPTHER

## 2022-04-18 RX ORDER — OXCARBAZEPINE 150 MG/1
150 TABLET, FILM COATED ORAL DAILY
Status: DISCONTINUED | OUTPATIENT
Start: 2022-04-18 | End: 2022-04-19

## 2022-04-18 RX ADMIN — BUPRENORPHINE AND NALOXONE 1 TABLET: 8; 2 TABLET SUBLINGUAL at 14:58

## 2022-04-18 RX ADMIN — LORAZEPAM 1 MG: 2 INJECTION INTRAMUSCULAR; INTRAVENOUS at 03:54

## 2022-04-18 RX ADMIN — HALOPERIDOL LACTATE 5 MG: 5 INJECTION, SOLUTION INTRAMUSCULAR at 21:32

## 2022-04-18 RX ADMIN — OXCARBAZEPINE 150 MG: 150 TABLET, FILM COATED ORAL at 18:00

## 2022-04-18 RX ADMIN — HALOPERIDOL LACTATE 5 MG: 5 INJECTION, SOLUTION INTRAMUSCULAR at 03:53

## 2022-04-18 NOTE — LETTER
[address] [place] [address] Way  Phone: [phone]          April 26, 2022     Patient: Heather Chiang   YOB: 1990   Date of Visit: 4/18/2022       To Whom It May Concern:    Please be advised that Victor Hugo Roche was admitted to [facility] on 4/18/2022 and was discharged on 4/26/2022. If you have any questions or concerns, please don't hesitate to call.     Sincerely,        Marshall Cruz, MSW, LSW

## 2022-04-18 NOTE — ED PROVIDER NOTES
HPI:  4/18/22, Time: 1:16 AM EDT         Patria Karimi is a 32 y.o. female presenting to the ED for psychiatric evaluation, beginning days ago. The complaint has been persistent, moderate in severity, and worsened by nothing. Patient presenting here because of auditory hallucinations. Patient reports arguing with the voices. Patient reporting this been going on for the last several days. Patient reports she was brought here by her family. Patient reporting no homicidal suicidal thoughts. The voices are not telling her to harm herself. She was just in the hospital.  Patient was just released from psychiatric [facility]. Patient is on her medication she has not missed any dosages. Patient reporting no chest pain no difficulty breathing no abdominal pain or vomiting she reports no fever chills or cough. There is no leg pain or swelling. She reports no headache. ROS:   Pertinent positives and negatives are stated within HPI, all other systems reviewed and are negative.  --------------------------------------------- PAST HISTORY ---------------------------------------------  Past Medical History:  has a past medical history of Concussion with loss of consciousness of 30 minutes or less, Herpes genitalia, Paranoid schizophrenia ([facility] and Ulcer. Past Surgical History:  has no past surgical history on file. Social History:  reports that she has been smoking cigarettes. She has never used smokeless tobacco. She reports current alcohol use. She reports that she does not use drugs. Family History: family history includes Diabetes in her father and mother; Other in her brother. The patients home medications have been reviewed.     Allergies: Cephalosporins, Cherry, Cherry flavor, Ibuprofen, Other, Peanut-containing drug products, Strawberry extract, Strawberry flavor, and Sulfamethoxazole-trimethoprim    ---------------------------------------------------PHYSICAL EXAM--------------------------------------    Constitutional/General: Alert and oriented x3,   Head: Normocephalic and atraumatic  Eyes: PERRL, EOMI  Mouth: Oropharynx clear, handling secretions, no trismus  Neck: Supple, full ROM, non tender to palpation in the midline, no stridor, no crepitus, no meningeal signs  Pulmonary: Lungs clear to auscultation bilaterally, no wheezes, rales, or rhonchi. Not in respiratory distress  Cardiovascular:  Regular rate. Regular rhythm. No murmurs, gallops, or rubs. 2+ distal pulses  Chest: no chest wall tenderness  Abdomen: Soft. Non tender. Non distended. +BS. No rebound, guarding, or rigidity. No pulsatile masses appreciated. Musculoskeletal: Moves all extremities x 4. Warm and well perfused, no clubbing, cyanosis, or edema. Capillary refill <3 seconds  Skin: warm and dry. No rashes. Neurologic: GCS 15, CN 2-12 grossly intact, no focal deficits, symmetric strength 5/5 in the upper and lower extremities bilaterally  Psych: Affect is flat not homicidal not suicidal positive auditory hallucination    -------------------------------------------------- RESULTS -------------------------------------------------  I have personally reviewed all laboratory and imaging results for this patient. Results are listed below.      LABS:  Results for orders placed or performed during the hospital encounter of 04/18/22   COVID-19 & Influenza Combo    Specimen: Nasopharyngeal Swab   Result Value Ref Range    SARS-CoV-2 RNA, RT PCR NOT DETECTED NOT DETECTED    INFLUENZA A NOT DETECTED NOT DETECTED    INFLUENZA B NOT DETECTED NOT DETECTED   Urine Drug Screen   Result Value Ref Range    Amphetamine Screen, Urine NOT DETECTED Negative <1000 ng/mL    Barbiturate Screen, Ur NOT DETECTED Negative < 200 ng/mL    Benzodiazepine Screen, Urine NOT DETECTED Negative < 200 ng/mL    Cannabinoid Scrn, Ur NOT DETECTED Negative < 50ng/mL    Cocaine Metabolite Screen, Urine NOT DETECTED Negative < 300 ng/mL Opiate Scrn, Ur NOT DETECTED Negative < 300ng/mL    PCP Screen, Urine NOT DETECTED Negative < 25 ng/mL    Methadone Screen, Urine NOT DETECTED Negative <300 ng/mL    Oxycodone Urine NOT DETECTED Negative <100 ng/mL    FENTANYL SCREEN, URINE NOT DETECTED Negative <1 ng/mL    Drug Screen Comment: see below    Serum Drug Screen   Result Value Ref Range    Ethanol Lvl 17 mg/dL    Acetaminophen Level <5.0 (L) 10.0 - 34.0 mcg/mL    Salicylate, Serum <3.3 0.0 - 30.0 mg/dL    TCA Scrn NEGATIVE Cutoff:300 ng/mL   CBC with Auto Differential   Result Value Ref Range    WBC 5.6 4.5 - 11.5 E9/L    RBC 4.36 3.50 - 5.50 E12/L    Hemoglobin 13.2 11.5 - 15.5 g/dL    Hematocrit 39.3 34.0 - 48.0 %    MCV 90.1 80.0 - 99.9 fL    MCH 30.3 26.0 - 35.0 pg    MCHC 33.6 32.0 - 34.5 %    RDW 13.1 11.5 - 15.0 fL    Platelets 168 733 - 617 E9/L    MPV 10.4 7.0 - 12.0 fL    Neutrophils % 66.2 43.0 - 80.0 %    Immature Granulocytes % 0.4 0.0 - 5.0 %    Lymphocytes % 23.6 20.0 - 42.0 %    Monocytes % 6.4 2.0 - 12.0 %    Eosinophils % 2.9 0.0 - 6.0 %    Basophils % 0.5 0.0 - 2.0 %    Neutrophils Absolute 3.70 1.80 - 7.30 E9/L    Immature Granulocytes # 0.02 E9/L    Lymphocytes Absolute 1.32 (L) 1.50 - 4.00 E9/L    Monocytes Absolute 0.36 0.10 - 0.95 E9/L    Eosinophils Absolute 0.16 0.05 - 0.50 E9/L    Basophils Absolute 0.03 0.00 - 0.20 E9/L   Comprehensive Metabolic Panel   Result Value Ref Range    Sodium 135 132 - 146 mmol/L    Potassium 3.5 3.5 - 5.0 mmol/L    Chloride 99 98 - 107 mmol/L    CO2 21 (L) 22 - 29 mmol/L    Anion Gap 15 7 - 16 mmol/L    Glucose 145 (H) 74 - 99 mg/dL    BUN 14 6 - 20 mg/dL    CREATININE 0.7 0.5 - 1.0 mg/dL    GFR Non-African American >60 >=60 mL/min/1.73    GFR African American >60     Calcium 9.4 8.6 - 10.2 mg/dL    Total Protein 7.5 6.4 - 8.3 g/dL    Albumin 4.3 3.5 - 5.2 g/dL    Total Bilirubin 0.6 0.0 - 1.2 mg/dL    Alkaline Phosphatase 58 35 - 104 U/L    ALT 19 0 - 32 U/L    AST 17 0 - 31 U/L   Urinalysis Result Value Ref Range    Color, UA Yellow Straw/Yellow    Clarity, UA SL CLOUDY Clear    Glucose, Ur Negative Negative mg/dL    Bilirubin Urine Negative Negative    Ketones, Urine Negative Negative mg/dL    Specific Gravity, UA 1.025 1.005 - 1.030    Blood, Urine TRACE-LYSED Negative    pH, UA 6.0 5.0 - 9.0    Protein, UA Negative Negative mg/dL    Urobilinogen, Urine 0.2 <2.0 E.U./dL    Nitrite, Urine Negative Negative    Leukocyte Esterase, Urine Negative Negative   Microscopic Urinalysis   Result Value Ref Range    WBC, UA 0-1 0 - 5 /HPF    RBC, UA 1-3 0 - 2 /HPF    Epithelial Cells, UA FEW /HPF    Bacteria, UA MODERATE (A) None Seen /HPF   POC Pregnancy Urine   Result Value Ref Range    HCG, Urine, POC Negative Negative    Lot Number [id number]     Positive QC Pass/Fail Pass     Negative QC Pass/Fail Pass    EKG 12 Lead   Result Value Ref Range    Ventricular Rate 98 BPM    Atrial Rate 98 BPM    P-R Interval 136 ms    QRS Duration 82 ms    Q-T Interval 356 ms    QTc Calculation (Bazett) 454 ms    P Axis 31 degrees    R Axis 50 degrees    T Axis 34 degrees       RADIOLOGY:  Interpreted by Radiologist.  No orders to display           EKG: This EKG is signed and interpreted by me. Rate: 98  Rhythm: Sinus  Interpretation: no acute changes  Comparison: stable as compared to patient's most recent EKG      ------------------------- NURSING NOTES AND VITALS REVIEWED ---------------------------   The nursing notes within the ED encounter and vital signs as below have been reviewed by myself. BP (!) 139/91   Pulse 99   Temp 98 °F (36.7 °C)   Resp 16   Ht 5' 5\" (1.651 m)   Wt 230 lb (104.3 kg)   SpO2 100%   BMI 38.27 kg/m²   Oxygen Saturation Interpretation: Normal    The patients available past medical records and past encounters were reviewed.         ------------------------------ ED COURSE/MEDICAL DECISION MAKING----------------------  Medications   haloperidol lactate (HALDOL) injection 5 mg (5 mg IntraMUSCular Given 4/18/22 8185)   LORazepam (ATIVAN) injection 1 mg (1 mg IntraMUSCular Given 4/18/22 8535)             Medical Decision Making:   Patient presenting here because of psychiatric evaluation. Patient having auditory hallucinations. Patient reporting the voices are arguing with her. Patient reporting no homicidal suicidal thoughts reports no chest pain or difficulty breathing she reports no abdominal pain or vomiting or diarrhea. Labs will be obtained plan will be for  to evaluate. Re-Evaluations:             Re-evaluation. Patients symptoms show no change  Patient is medically clear    Consultations:             Social work    Critical Care: This patient's ED course included: a personal history and physicial eaxmination    This patient has been closely monitored during their ED course. Counseling: The emergency provider has spoken with the patient and discussed todays results, in addition to providing specific details for the plan of care and counseling regarding the diagnosis and prognosis. Questions are answered at this time and they are agreeable with the plan.       --------------------------------- IMPRESSION AND DISPOSITION ---------------------------------    IMPRESSION  1. Auditory hallucination    2. [facility] ([facility]        DISPOSITION  Disposition:  to assist with disposition  Patient condition is stable        NOTE: This report was transcribed using voice recognition software.  Every effort was made to ensure accuracy; however, inadvertent computerized transcription errors may be present          Christina Leger MD  04/18/22 [facility] Arlene Fuentes MD  04/18/22 [facility] [address], MD  04/18/22 [facility] [address], MD  04/18/22 2137

## 2022-04-19 PROBLEM — F10.10 ALCOHOL ABUSE: Status: ACTIVE | Noted: 2022-04-19

## 2022-04-19 PROCEDURE — 6370000000 HC RX 637 (ALT 250 FOR IP): Performed by: EMERGENCY MEDICINE

## 2022-04-19 PROCEDURE — 6370000000 HC RX 637 (ALT 250 FOR IP): Performed by: NURSE PRACTITIONER

## 2022-04-19 PROCEDURE — 1240000000 HC EMOTIONAL WELLNESS R&B

## 2022-04-19 PROCEDURE — 6360000002 HC RX W HCPCS: Performed by: NURSE PRACTITIONER

## 2022-04-19 PROCEDURE — 90792 PSYCH DIAG EVAL W/MED SRVCS: CPT | Performed by: NURSE PRACTITIONER

## 2022-04-19 RX ORDER — OXCARBAZEPINE 300 MG/1
300 TABLET, FILM COATED ORAL DAILY
Status: DISCONTINUED | OUTPATIENT
Start: 2022-04-20 | End: 2022-04-25

## 2022-04-19 RX ORDER — RISPERIDONE 0.5 MG/1
1 TABLET, ORALLY DISINTEGRATING ORAL 2 TIMES DAILY
Status: DISCONTINUED | OUTPATIENT
Start: 2022-04-19 | End: 2022-04-20

## 2022-04-19 RX ADMIN — Medication 3 MG: at 21:02

## 2022-04-19 RX ADMIN — RISPERIDONE 1 MG: 0.5 TABLET, ORALLY DISINTEGRATING ORAL at 10:33

## 2022-04-19 RX ADMIN — BUPRENORPHINE AND NALOXONE 1 TABLET: 8; 2 TABLET SUBLINGUAL at 08:48

## 2022-04-19 RX ADMIN — RISPERIDONE 1 MG: 0.5 TABLET, ORALLY DISINTEGRATING ORAL at 21:02

## 2022-04-19 RX ADMIN — Medication 3 MG: at 00:50

## 2022-04-19 RX ADMIN — OXCARBAZEPINE 150 MG: 150 TABLET, FILM COATED ORAL at 08:48

## 2022-04-19 ASSESSMENT — SLEEP AND FATIGUE QUESTIONNAIRES
DIFFICULTY ARISING: NO
DO YOU HAVE DIFFICULTY SLEEPING: YES
RESTFUL SLEEP: NO
DIFFICULTY STAYING ASLEEP: YES
DIFFICULTY FALLING ASLEEP: YES
DO YOU HAVE DIFFICULTY SLEEPING: YES
DIFFICULTY FALLING ASLEEP: YES
DO YOU USE A SLEEP AID: YES
AVERAGE NUMBER OF SLEEP HOURS: 3
DO YOU USE A SLEEP AID: YES
DIFFICULTY STAYING ASLEEP: YES
SLEEP PATTERN: DIFFICULTY FALLING ASLEEP;RESTLESSNESS;DISTURBED/INTERRUPTED SLEEP
SLEEP PATTERN: DIFFICULTY FALLING ASLEEP;DISTURBED/INTERRUPTED SLEEP;RESTLESSNESS
RESTFUL SLEEP: NO
DIFFICULTY ARISING: YES
AVERAGE NUMBER OF SLEEP HOURS: 3

## 2022-04-19 ASSESSMENT — PATIENT HEALTH QUESTIONNAIRE - PHQ9: SUM OF ALL RESPONSES TO PHQ QUESTIONS 1-9: 20

## 2022-04-19 ASSESSMENT — LIFESTYLE VARIABLES
HISTORY_ALCOHOL_USE: YES
HISTORY_ALCOHOL_USE: YES

## 2022-04-19 NOTE — PLAN OF CARE
Problem: Altered Mood, Psychotic Behavior:  Goal: Able to verbalize decrease in frequency and intensity of hallucinations  Description: Able to verbalize decrease in frequency and intensity of hallucinations  Outcome: Ongoing  Goal: Able to verbalize reality based thinking  Description: Able to verbalize reality based thinking  Outcome: Ongoing  Goal: Absence of self-harm  Description: Absence of self-harm  Outcome: Ongoing  Goal: Ability to achieve adequate nutritional intake will improve  Description: Ability to achieve adequate nutritional intake will improve  Outcome: Ongoing  Goal: Ability to interact with others will improve  Description: Ability to interact with others will improve  Outcome: Ongoing  Goal: Compliance with prescribed medication regimen will improve  Description: Compliance with prescribed medication regimen will improve  Outcome: Ongoing  Goal: Patient specific goal  Description: Patient specific goal  Outcome: Ongoing
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
## 2022-04-19 NOTE — PROGRESS NOTES
Patient denies SI,HI, but states she hears voices talking about her. States it is making her agitated. States at night they go away. She also states she has a demon in her. States she recently switched jobs with a different [address] She is social on the unit. Attends groups, Takes her meds. Will continue to monitor.

## 2022-04-19 NOTE — GROUP NOTE
Group Therapy Note    Date: 4/19/2022    Group Start Time: 1400  Group End Time: 6294  Group Topic: Recovery    [facility] [address]        Group Therapy Note    Attendees: 14         Notes: Attended peer recovery group. Active listening and accepting of handout. Status After Intervention:  Improved    Participation Level:  Active Listener and Interactive    Participation Quality: Appropriate, Attentive and Sharing      Speech:  normal      Thought Process/Content: Logical      Affective Functioning: Congruent      Mood: euthymic      Level of consciousness:  Alert and Attentive      Response to Learning: Progressing to goal      Endings: None Reported    Modes of Intervention: Education, Support, Socialization and Exploration      Discipline Responsible: Psychoeducational Specialist      Signature:  Rusty Pool, [address]

## 2022-04-19 NOTE — PROGRESS NOTES
Attended evening relaxation activity. Enjoyed guided imagery exercise. Pleasant in sharing ways to increase daily practice. Was 1 of 11 in attendance.

## 2022-04-19 NOTE — ED NOTES
Continues to hear voices telling her to hurt herself pt denies suicidal ideations     Gaston Walter [facility]  04/19/22 1949
Patient approached nurses station and sts,\"My voices are getting worse. I need something and my Risperdal isn't working. \" Dr. Rachel Mejia notified  And orders initiated .       Winston Armijo RN  04/18/22 6791
Patient sts,\"I'm  hearing voices and not sure what I'm hearing. \" Denies SI/HI.       Miranda Early RN  04/18/22 6252
Patient was accepted to Varun Mahajan RN  04/18/22 2116
Patients belongings in [address]  04/18/22 2968
Pt alert oriented skin warm dry resp easy pt admits to hearing voices that are arguing denies suicidal ideations at this time pt has fair eye contact flat affect is cooperative anxious pt states is afraid but does not elaborate why     Gaston Walter RN  04/18/22 2018
Pt continues to c/o hearing voices     Mari Cotton RN  04/19/22 7428
Pt requesting for med to help her relax continues to hear voices     Rebecca Prasad, SHERIE  04/18/22 0926
Pt resting with eyes closed awakens easily alert oriented skin warm dry resp easy pt states doesn't hear voices when sleeping only when awake denies suicidal ideations pt calm cooperative     Libia Yancey RN  04/19/22 8890
Pt resting with eyes closed resp easy     Beryl Kussmaul, SHERIE  04/18/22 5060
Report called to constanza Walter RN  04/19/22 4727
The pt was accepted to [facility] room 3253. Disposition called to David Perez in admitting.        Vinny Barger, [facility]  04/18/22 2006
Outpatient Provider:   [] Crisis Unit:   [x] Inpatient Psychiatric Unit:  [] Other:     Patient having psychosis, KATRINA SW will pursue inpatient admission                 YNES Cerna, FRANCISW  04/18/22 0241
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
## 2022-04-19 NOTE — PROGRESS NOTES
[facility]  Admission Note   Pt alert, paranoid, and cooperative. Pt presents with AH of her  Mom, brother, and friend. Pt states they have gotten worse over the last 3 days. Pt states she has been drinking 2  Wine coolers a day. Denies SI, HI, and VH. States it is making it hard to concentrate at times. Only hears them when shes awake, Oriented p to room. States she will not do as they tell her to hurt herself and call her a \"stupid bitch\". Pt states her f/u appt at [facility] is coming up on Wednesday for her refills as well. Pt states she has been compliant with them. Will continue to monitor. Pt currently sleeping    Admission Type:   Admission Type: Involuntary    Reason for admission:  Reason for Admission: audio hallucinations x last 3 days    PATIENT STRENGTHS:  Strengths: Communication,Other,Positive Support,Medication Compliance    Patient Strengths and Limitations:  Limitations: Tendency to isolate self,Lacks leisure interests    Addictive Behavior:   Addictive Behavior  In the past 3 months, have you felt or has someone told you that you have a problem with:  : None  Do you have a history of Alcohol Use?: Yes  Do you have a history of Street Drug Abuse?: No    Medical Problems:   Past Medical History:   Diagnosis Date    Concussion with loss of consciousness of 30 minutes or less 2018    Herpes genitalia     [facility] ([facility]     Seizures ([facility]     Ulcer        Status EXAM:  Status and Exam  Normal: No  Facial Expression: Flat,Worried  Affect: Blunt  Level of Consciousness: Alert  Mood:Normal: No  Mood: Anxious,Suspicious  Motor Activity:Normal: Yes  Interview Behavior: Cooperative  Preception: [place] to Person,[place] to Time,[place] to Place,[place] to Situation  Attention:Normal: Yes  Thought Processes: Circumstantial  Thought Content:Normal: Yes  Thought Content: Paranoia  Hallucinations:  Auditory (Comment)  Delusions: No  Memory:Normal: Yes  Memory: Poor Recent  Insight and Judgment: Yes  Present Suicidal Ideation: No  Present Homicidal Ideation: No    Tobacco Screening:  Practical Counseling, on admission, johny X, if applicable and completed (first 3 are required if patient doesn't refuse): (x )  Recognizing danger situations (included triggers and roadblocks)                    (x )  Coping skills (new ways to manage stress, exercise, relaxation techniques, changing routine, distraction)                                                           ( )  Basic information about quitting (benefits of quitting, techniques in how to quit, available resources  ( ) Referral for counseling faxed to [facility]                                           ( ) Patient refused counseling  ( ) Patient has not smoked in the last 30 days    Metabolic Screening:    Lab Results   Component Value Date    LABA1C 5.3 03/30/2022       Lab Results   Component Value Date    CHOL 183 03/29/2022    CHOL 221 (H) 07/14/2020    CHOL 190 08/11/2016    CHOL 186 07/27/2015     Lab Results   Component Value Date    TRIG 55 03/29/2022    TRIG 67 07/14/2020    TRIG 103 08/11/2016    TRIG 83 07/27/2015     Lab Results   Component Value Date    HDL 65 03/29/2022    HDL 74 07/14/2020    HDL 41 08/11/2016    HDL 51 07/27/2015     No components found for: LDLCAL  Lab Results   Component Value Date    LABVLDL 11 03/29/2022    LABVLDL 13 07/14/2020    LABVLDL 21 08/11/2016    LABVLDL 17 07/27/2015         Body mass index is 38.27 kg/m². BP Readings from Last 2 Encounters:   04/19/22 126/86   04/05/22 120/71           Pt admitted with followings belongings:        Patient's home medications were place din [facility] room. Patient oriented to surroundings and program expectations and copy of patient rights given. Received admission packet. Consents  reviewed, signed. Refused. Patient verbalize understanding. Patient education on precautions.                    Chuy Husbands, RN

## 2022-04-19 NOTE — PROGRESS NOTES
Attended morning community meeting. Updated on staffing and daily expectations. Shared goal for the day as \" Minimize the negative voices in my head, and push for the positive thoughts\".

## 2022-04-19 NOTE — GROUP NOTE
Group Therapy Note    Date: 4/19/2022    Group Start Time: 1000  Group End Time: 1050  Group Topic: Psychoeducation    [facility] [address]        Group Therapy Note    Attendees: 19         Notes: Active and engaged during discussion on healthy boundaries. Pleasant in sharing experiences with peers. Status After Intervention:  Improved    Participation Level:  Active Listener    Participation Quality: Appropriate and Attentive      Speech:  Hesitant    Thought Process/Content: Linear      Affective Functioning: Constricted/Restricted      Mood: depressed      Level of consciousness:  Alert and Attentive      Response to Learning: Progressing to goal      Endings: None Reported    Modes of Intervention: Education, Support, Socialization and Exploration      Discipline Responsible: Psychoeducational Specialist      Signature:  [employer], [address]

## 2022-04-20 PROCEDURE — 6370000000 HC RX 637 (ALT 250 FOR IP): Performed by: NURSE PRACTITIONER

## 2022-04-20 PROCEDURE — 6360000002 HC RX W HCPCS: Performed by: NURSE PRACTITIONER

## 2022-04-20 PROCEDURE — 99232 SBSQ HOSP IP/OBS MODERATE 35: CPT | Performed by: NURSE PRACTITIONER

## 2022-04-20 PROCEDURE — 1240000000 HC EMOTIONAL WELLNESS R&B

## 2022-04-20 RX ORDER — RISPERIDONE 2 MG/1
2 TABLET, ORALLY DISINTEGRATING ORAL NIGHTLY
Status: DISCONTINUED | OUTPATIENT
Start: 2022-04-20 | End: 2022-04-21

## 2022-04-20 RX ORDER — RISPERIDONE 0.5 MG/1
1 TABLET, ORALLY DISINTEGRATING ORAL DAILY
Status: DISCONTINUED | OUTPATIENT
Start: 2022-04-21 | End: 2022-04-21

## 2022-04-20 RX ADMIN — RISPERIDONE 1 MG: 0.5 TABLET, ORALLY DISINTEGRATING ORAL at 09:18

## 2022-04-20 RX ADMIN — RISPERIDONE 2 MG: 2 TABLET, ORALLY DISINTEGRATING ORAL at 21:19

## 2022-04-20 RX ADMIN — BUPRENORPHINE AND NALOXONE 1 TABLET: 8; 2 TABLET SUBLINGUAL at 09:18

## 2022-04-20 RX ADMIN — Medication 3 MG: at 21:19

## 2022-04-20 RX ADMIN — OXCARBAZEPINE 300 MG: 300 TABLET, FILM COATED ORAL at 09:18

## 2022-04-20 ASSESSMENT — PAIN SCALES - GENERAL
PAINLEVEL_OUTOF10: 0
PAINLEVEL_OUTOF10: 0

## 2022-04-20 NOTE — PROGRESS NOTES
BEHAVIORAL HEALTH FOLLOW-UP NOTE     4/20/2022     Patient was seen and examined in person, Chart reviewed   Patient's case discussed with staff/team    Chief Complaint: \" I have heard voices and I was 11years old. \"    Interim History: Patient seen during treatment team.  She states that she has heard voices since she was 11years old. She denies SI/HI intent or plan endorses auditory hallucinations states she hears voices and demons and states the voices are \"listening to me. \"  Her affect is flat and blunted thought processes slow she is agreeable to the injection asked \"without me my voices stop. \"      Appetite:   [x] Normal/Unchanged  [] Increased  [] Decreased      Sleep:       [x] Normal/Unchanged  [] Fair       [] Poor              Energy:    [x] Normal/Unchanged  [] Increased  [] Decreased        SI [] Present  [x] Absent    HI  []Present  [x] Absent     Aggression:  [] yes  [x] no    Patient is [x] able  [] unable to CONTRACT FOR SAFETY     PAST MEDICAL/PSYCHIATRIC HISTORY:   Past Medical History:   Diagnosis Date    Concussion with loss of consciousness of 30 minutes or less 6/30/2018    Herpes genitalia     [facility] [facility]     Seizures ([facility] [address] [facility]     Ulcer        FAMILY/SOCIAL HISTORY:  Family History   Problem Relation Age of Onset    Diabetes Mother     Diabetes Father     Other Brother     Hypertension Maternal Grandmother      Social History     Socioeconomic History    Marital status: Single     Spouse name: Not on file    Number of children: 1    Years of education: 8    Highest education level: 10th grade   Occupational History    Not on file   Tobacco Use    Smoking status: Current Some Day Smoker     Types: Cigarettes    Smokeless tobacco: Never Used    Tobacco comment: Started smoking again after mom passed away  2021   Vaping Use    Vaping Use: Never used   Substance and Sexual Activity    Alcohol use: Yes     Comment: occasionally-holidays    Drug use: No    Sexual activity: Yes     Partners: Male   Other Topics Concern    Not on file   Social History Narrative    2/24/2021-Stress-pt reports that she is very much stressed-pt recently lost her mother and she has been having a hard time dealing with her loss. Pt is currently receiving grief counseling through [facility] in [place] [id number] Determinants of Health     Financial Resource Strain:     Difficulty of Paying Living Expenses: Not on file   Food Insecurity:     Worried About Running Out of Food in the Last Year: Not on file    Suzanne of Food in the Last Year: Not on file   Transportation Needs:     Lack of Transportation (Medical): Not on file    Lack of Transportation (Non-Medical): Not on file   Physical Activity:     Days of Exercise per Week: Not on file    Minutes of Exercise per Session: Not on file   Stress:     Feeling of Stress : Not on file   Social Connections:     Frequency of Communication with Friends and Family: Not on file    Frequency of Social Gatherings with Friends and Family: Not on file    Attends Alevism Services: Not on file    Active Member of [address] [place] [address] [employer] or Organizations: Not on file    Attends Club or Organization Meetings: Not on file    Marital Status: Not on file   Intimate Partner Violence:     Fear of Current or Ex-Partner: Not on file    Emotionally Abused: Not on file    Physically Abused: Not on file    Sexually Abused: Not on file   Housing Stability:     Unable to Pay for Housing in the Last Year: Not on file    Number of [place] in the Last Year: Not on file    Unstable Housing in the Last Year: Not on file           ROS:  [x] All negative/unchanged except if checked.  Explain positive(checked items) below:  [] Constitutional  [] Eyes  [] Ear/Nose/Mouth/Throat  [] Respiratory  [] CV  [] GI  []   [] Musculoskeletal  [] Skin/Breast  [] Neurological  [] Endocrine  [] Heme/Lymph  [] Allergic/Immunologic    Explanation:     MEDICATIONS:    Current Facility-Administered Medications:     OXcarbazepine (TRILEPTAL) tablet 300 mg, 300 mg, Oral, Daily, AKHIL Almazan CNP, 962 mg at 04/20/22 4674    risperiDONE (RISPERDAL M-TABS) disintegrating tablet 1 mg, 1 mg, Oral, BID, AKHIL Almazan CNP, 1 mg at 04/20/22 0918    melatonin tablet 3 mg, 3 mg, Oral, Nightly PRN, AKHIL Desir CNP    buprenorphine-naloxone (SUBOXONE) 8-2 MG SL tablet 1 tablet, 1 tablet, SubLINGual, Daily, AKHIL Cline CNP, 1 tablet at 04/20/22 0839    acetaminophen (TYLENOL) tablet 650 mg, 650 mg, Oral, V6R PRN, AKHIL Almazan CNP    magnesium hydroxide (MILK OF MAGNESIA) 400 MG/5ML suspension 30 mL, 30 mL, Oral, Daily PRN, AKHIL Almazan CNP    aluminum & magnesium hydroxide-simethicone (MAALOX) 200-200-20 MG/5ML suspension 30 mL, 30 mL, Oral, PRN, AKHIL Almazan CNP    haloperidol (HALDOL) tablet 5 mg, 5 mg, Oral, Q6H PRN **OR** haloperidol lactate (HALDOL) injection 5 mg, 5 mg, IntraMUSCular, I2P PRN, AKHIL Almazan CNP, 5 mg at 04/18/22 2132    melatonin tablet 3 mg, 3 mg, Oral, Nightly, AKHIL Almazan CNP, 3 mg at 04/19/22 2102    chlordiazePOXIDE (LIBRIUM) capsule 25 mg, 25 mg, Oral, N6O PRN, AKIHL Almazan CNP      Examination:  /60   Pulse 70   Temp 97.7 °F (36.5 °C) (Oral)   Resp 14   Ht 5' 5\" (1.651 m)   Wt 230 lb (104.3 kg)   SpO2 96%   BMI 38.27 kg/m²   Gait - steady  Medication side effects(SE): denies    Mental Status Examination:    Level of consciousness:  within normal limits   Appearance:  fair grooming and fair hygiene  Behavior/Motor:  no abnormalities noted  Attitude toward examiner:  cooperative  Speech:  spontaneous, normal rate and normal volume   Mood: \" I am okay. \"  Affect: Flat and blunted  Thought processes: Slow  Thought content: With auditory hallucinations of voices of demons denies SI/HI intent or plan  Cognition:  oriented to person, place, and time Concentration intact  Insight poor   Judgement poor     ASSESSMENT:   Patient symptoms are:  [] Well controlled  [] Improving  [] Worsening  [x] No change      Diagnosis:  Principal Problem:    Paranoid schizophrenia ([facility] [address]  Active Problems:    Alcohol abuse  Resolved Problems:    * No resolved hospital problems. *      LABS:    Recent Labs     04/18/22 0133   WBC 5.6   HGB 13.2        Recent Labs     04/18/22 0133      K 3.5   CL 99   CO2 21*   BUN 14   CREATININE 0.7   GLUCOSE 145*     Recent Labs     04/18/22 0133   BILITOT 0.6   ALKPHOS 58   AST 17   ALT 19     Lab Results   Component Value Date    LABAMPH NOT DETECTED 04/18/2022    LABAMPH NOT DETECTED 07/30/2011    BARBSCNU NOT DETECTED 04/18/2022    LABBENZ NOT DETECTED 04/18/2022    LABBENZ SEE BELOW 08/29/2014    CANNAB POSITIVE  07/30/2011    LABMETH NOT DETECTED 04/18/2022    OPIATESCREENURINE NOT DETECTED 04/18/2022    PHENCYCLIDINESCREENURINE NOT DETECTED 04/18/2022    ETOH 17 04/18/2022     Lab Results   Component Value Date    TSH 0.899 07/14/2020     No results found for: LITHIUM  No results found for: VALPROATE, CBMZ        Treatment Plan:  Reviewed current Medications with the patient. Risks, benefits, side effects, drug-to-drug interactions and alternatives to treatment were discussed. Collateral information:   CD evaluation  Encourage patient to attend group and other milieu activities.   Discharge planning discussed with the patient and treatment team.    Continue Suboxone 8/2 daily  Continue Trileptal 300 mg twice daily  We will increase Resporal 1 mg daily and 2 mg at bedtime      PSYCHOTHERAPY/COUNSELING:  [x] Therapeutic interview  [x] Supportive  [] CBT  [] Ongoing  [] Other    [x] Patient continues to need, on a daily basis, active treatment furnished directly by or requiring the supervision of inpatient psychiatric personnel      Anticipated Length of stay: 3 to 7 days based on stability            Electronically signed by Patsy Campos, APRN - CNP on 9/41/0904 at 1:13 PM

## 2022-04-20 NOTE — PROGRESS NOTES
Attended morning community meeting. Updated on staffing and daily expectations. Shared goal for the day as to stay positive and try to stay woke.

## 2022-04-20 NOTE — PROGRESS NOTES
On phone and talking with other clients. Has been out of her room most of the shift. Expressed thoughts are delayed.

## 2022-04-20 NOTE — BH NOTE
Patient is compliant with medications, denies suicidal thoughts, reports auditory hallucinations since age 8 \"that listen to my conversation\". Patient states her hallucinations are non-command in nature. Patient is preoccupied, flat, and blunt. Patient denies homicidal ideation.  Patient is cooperative, appetite is good, behavior in control

## 2022-04-20 NOTE — GROUP NOTE
Group Therapy Note    Date: 4/20/2022    Group Start Time: 1100  Group End Time: 1150  Group Topic: Psychotherapy    [facility] 1    YNES Coley, SUSAN        Group Therapy Note    Attendees: 7         Patient's Goal:  To increase social interaction and improve relationships with others. Notes: Pt was attentive in group and was able to identify an agenda. She was also able to verbalize relating to others within the group. Status After Intervention:  Improved    Participation Level:  Active Listener and Interactive    Participation Quality: Appropriate, Attentive, Sharing and Supportive      Speech:  normal      Thought Process/Content: Linear      Affective Functioning: Congruent      Mood: anxious      Level of consciousness:  Alert and Oriented x4      Response to Learning: Able to verbalize current knowledge/experience      Endings: None Reported    Modes of Intervention: Support, Socialization and Exploration      Discipline Responsible: /Counselor      Signature:  Kirk Claude, MSW, SUSAN

## 2022-04-20 NOTE — CARE COORDINATION
SW spoke with this pt regarding where she would like to treat outpatient for counseling. Pt states that she would like to treat at [facility]. SW notified pt's assigned SW of this.

## 2022-04-20 NOTE — CARE COORDINATION
Sw contacted [facility] [phone] and scheduled a follow up appointment on 4/27 for medication management. [facility] officers group counseling, they do have individual counseling however individuals are referred by their provider. Sw to talk with pt about this.

## 2022-04-20 NOTE — CARE COORDINATION
Sadie received a call from pt step mom [facility]. Amnita reports at first pt was doing good but then she started having major problems with the voices in her head. She stated pt was arguing with herself, she wasn't sleeping, and she thought people were watching her. Ranmaggie reports pt was taking her meds but they were not working. Pt was mainly staying at [facility] and she would go to her apartment every once in awhile. Pt is able to return to [facility] home and to her apartment at time of discharge. Ranmaggie reports pt daughter is with pt older brother at this time. Pt does not have access to any guns or other weapons and her only concern is with pt getting better.

## 2022-04-20 NOTE — PLAN OF CARE
Problem: Altered Mood, Psychotic Behavior:  Goal: Able to verbalize decrease in frequency and intensity of hallucinations  Description: Able to verbalize decrease in frequency and intensity of hallucinations  4/20/2022 1031 by Nikolas Kerr RN  Outcome: Progressing  4/19/2022 2232 by Yadira Leon RN  Outcome: Ongoing  Goal: Able to verbalize reality based thinking  Description: Able to verbalize reality based thinking  4/20/2022 1031 by Nikolas Kerr RN  Outcome: Progressing  4/19/2022 2232 by Yadira Leon RN  Outcome: Met This Shift  Goal: Absence of self-harm  Description: Absence of self-harm  4/20/2022 1031 by Nikolas Kerr RN  Outcome: Progressing  4/19/2022 2232 by Yadira Leon RN  Outcome: Met This Shift  Goal: Ability to achieve adequate nutritional intake will improve  Description: Ability to achieve adequate nutritional intake will improve  4/20/2022 1031 by Nikolas Kerr RN  Outcome: Progressing  4/19/2022 2232 by Yadira Leon RN  Outcome: Met This Shift  Goal: Ability to interact with others will improve  Description: Ability to interact with others will improve  4/20/2022 1031 by Nikolas Kerr RN  Outcome: Progressing  4/19/2022 2232 by Yadira Leon RN  Outcome: Met This Shift  Goal: Compliance with prescribed medication regimen will improve  Description: Compliance with prescribed medication regimen will improve  4/20/2022 1031 by Nikolas Kerr RN  Outcome: Progressing  4/19/2022 2232 by Yadira Leon RN  Outcome: Met This Shift  Goal: Patient specific goal  Description: Patient specific goal  4/20/2022 1031 by Nikolas Kerr RN  Outcome: Progressing  4/19/2022 2232 by Yadira Leon RN  Outcome: Ongoing

## 2022-04-20 NOTE — GROUP NOTE
Date: 4/20/2022    Group Start Time: 1010  Group End Time: 1945  Group Topic: Psychoeducation    [facility] 1    Graham Vila, [address]                                                                          Group Therapy Note    Date: 4/20/2022    Type of Group: Psychoeducation    Wellness Binder Information  Module Name: healthy habits for wellness   Patient's Goal: patient will be able to identify basic changes to help ones wellness. Notes:  pleasant and engaged in group. Able to share appropriately with others. Status After Intervention:  Improved    Participation Level:  Active Listener and Interactive    Participation Quality: Appropriate, Attentive, and Sharing      Speech:  normal       Thought Process/Content: Logical      Affective Functioning: Congruent      Mood: euthymic      Level of consciousness:  Alert, Oriented x4, and Attentive      Response to Learning: Able to verbalize/acknowledge new learning, Able to retain information, and Progressing to goal      Endings: None Reported    Modes of Intervention: Education, Support, Socialization, and Problem-solving      Discipline Responsible: Psychoeducational Specialist      Signature:  Dean Domínguez

## 2022-04-20 NOTE — PLAN OF CARE
Problem: Altered Mood, Psychotic Behavior:  Goal: Able to verbalize decrease in frequency and intensity of hallucinations  Description: Able to verbalize decrease in frequency and intensity of hallucinations  4/19/2022 2232 by Juvenal Ozuna RN  Outcome: Ongoing     Problem: Altered Mood, Psychotic Behavior:  Goal: Able to verbalize reality based thinking  Description: Able to verbalize reality based thinking  4/19/2022 2232 by Juvenal Ozuna RN  Outcome: Met This Shift     Problem: Altered Mood, Psychotic Behavior:  Goal: Absence of self-harm  Description: Absence of self-harm  4/19/2022 2232 by Juvenal Ozuna RN  Outcome: Met This Shift     Problem: Altered Mood, Psychotic Behavior:  Goal: Ability to achieve adequate nutritional intake will improve  Description: Ability to achieve adequate nutritional intake will improve  4/19/2022 2232 by Juvenal Ozuna RN  Outcome: Met This Shift     Problem: Altered Mood, Psychotic Behavior:  Goal: Ability to interact with others will improve  Description: Ability to interact with others will improve  4/19/2022 2232 by Juvenal Ozuna RN  Outcome: Met This Shift     Problem: Altered Mood, Psychotic Behavior:  Goal: Compliance with prescribed medication regimen will improve  Description: Compliance with prescribed medication regimen will improve  4/19/2022 2232 by Juvenal Ozuna RN  Outcome: Met This Shift     Problem: Altered Mood, Psychotic Behavior:  Goal: Compliance with prescribed medication regimen will improve  Description: Compliance with prescribed medication regimen will improve  4/19/2022 2232 by Juvenal Ozuna RN  Outcome: Met This Shift     Problem: Altered Mood, Psychotic Behavior:  Goal: Patient specific goal  Description: Patient specific goal  4/19/2022 2232 by Juvenal Ozuna RN  Outcome: Ongoing

## 2022-04-20 NOTE — BH NOTE
[facility]  Day 3 Interdisciplinary Treatment Plan NOTE    Review Date & Time: 4/20/2022 0900    Patient was not in treatment team    Estimated Length of Stay Update:  3-5 days  Estimated Discharge Date Update: 4/23/2022    EDUCATION:   Learner Progress Toward Treatment Goals: Reviewed group plan and strategies    Method: Small group    Outcome: Verbalized understanding    PATIENT GOALS: medication compliance and group therapy attendance    PLAN/TREATMENT RECOMMENDATIONS UPDATE:medication compliance and group therapy attendance    GOALS UPDATE:   Time frame for Short-Term Goals: 3-5 days      Skinny Crowell RN

## 2022-04-21 PROBLEM — F32.9 MAJOR DEPRESSIVE EPISODE: Status: ACTIVE | Noted: 2022-04-21

## 2022-04-21 PROCEDURE — 6360000002 HC RX W HCPCS: Performed by: NURSE PRACTITIONER

## 2022-04-21 PROCEDURE — 1240000000 HC EMOTIONAL WELLNESS R&B

## 2022-04-21 PROCEDURE — 6370000000 HC RX 637 (ALT 250 FOR IP): Performed by: PSYCHIATRY & NEUROLOGY

## 2022-04-21 PROCEDURE — 99232 SBSQ HOSP IP/OBS MODERATE 35: CPT | Performed by: NURSE PRACTITIONER

## 2022-04-21 PROCEDURE — 6370000000 HC RX 637 (ALT 250 FOR IP): Performed by: NURSE PRACTITIONER

## 2022-04-21 RX ORDER — HYDROXYZINE PAMOATE 50 MG/1
50 CAPSULE ORAL 3 TIMES DAILY PRN
Status: DISCONTINUED | OUTPATIENT
Start: 2022-04-21 | End: 2022-04-26 | Stop reason: HOSPADM

## 2022-04-21 RX ORDER — ACETAMINOPHEN 325 MG/1
650 TABLET ORAL EVERY 6 HOURS PRN
Status: DISCONTINUED | OUTPATIENT
Start: 2022-04-21 | End: 2022-04-26 | Stop reason: HOSPADM

## 2022-04-21 RX ORDER — HALOPERIDOL 5 MG
5 TABLET ORAL EVERY 6 HOURS PRN
Status: DISCONTINUED | OUTPATIENT
Start: 2022-04-21 | End: 2022-04-26 | Stop reason: HOSPADM

## 2022-04-21 RX ORDER — NICOTINE 21 MG/24HR
1 PATCH, TRANSDERMAL 24 HOURS TRANSDERMAL DAILY
Status: DISCONTINUED | OUTPATIENT
Start: 2022-04-21 | End: 2022-04-21 | Stop reason: SDUPTHER

## 2022-04-21 RX ORDER — NICOTINE 21 MG/24HR
1 PATCH, TRANSDERMAL 24 HOURS TRANSDERMAL DAILY
Status: DISCONTINUED | OUTPATIENT
Start: 2022-04-22 | End: 2022-04-26 | Stop reason: HOSPADM

## 2022-04-21 RX ORDER — MAGNESIUM HYDROXIDE/ALUMINUM HYDROXICE/SIMETHICONE 120; 1200; 1200 MG/30ML; MG/30ML; MG/30ML
30 SUSPENSION ORAL PRN
Status: DISCONTINUED | OUTPATIENT
Start: 2022-04-21 | End: 2022-04-26 | Stop reason: HOSPADM

## 2022-04-21 RX ORDER — HYDROXYZINE HYDROCHLORIDE 10 MG/1
50 TABLET, FILM COATED ORAL 3 TIMES DAILY PRN
Status: DISCONTINUED | OUTPATIENT
Start: 2022-04-21 | End: 2022-04-21 | Stop reason: SDUPTHER

## 2022-04-21 RX ORDER — HALOPERIDOL 5 MG/ML
5 INJECTION INTRAMUSCULAR EVERY 6 HOURS PRN
Status: DISCONTINUED | OUTPATIENT
Start: 2022-04-21 | End: 2022-04-26 | Stop reason: HOSPADM

## 2022-04-21 RX ORDER — NICOTINE 21 MG/24HR
1 PATCH, TRANSDERMAL 24 HOURS TRANSDERMAL DAILY
Status: DISCONTINUED | OUTPATIENT
Start: 2022-04-21 | End: 2022-04-21

## 2022-04-21 RX ORDER — LANOLIN ALCOHOL/MO/W.PET/CERES
3 CREAM (GRAM) TOPICAL NIGHTLY
Status: DISCONTINUED | OUTPATIENT
Start: 2022-04-21 | End: 2022-04-26 | Stop reason: HOSPADM

## 2022-04-21 RX ADMIN — RISPERIDONE 3 MG: 2 TABLET, ORALLY DISINTEGRATING ORAL at 21:23

## 2022-04-21 RX ADMIN — Medication 3 MG: at 21:23

## 2022-04-21 RX ADMIN — BUPRENORPHINE AND NALOXONE 1 TABLET: 8; 2 TABLET SUBLINGUAL at 09:56

## 2022-04-21 RX ADMIN — RISPERIDONE 1 MG: 0.5 TABLET, ORALLY DISINTEGRATING ORAL at 09:56

## 2022-04-21 RX ADMIN — OXCARBAZEPINE 300 MG: 300 TABLET, FILM COATED ORAL at 09:56

## 2022-04-21 ASSESSMENT — PAIN SCALES - GENERAL
PAINLEVEL_OUTOF10: 0
PAINLEVEL_OUTOF10: 0

## 2022-04-21 NOTE — PROGRESS NOTES
Attended morning community meeting. Updated on staffing and daily expectations.    Shared goal for the day as \" Keep a positive attitude- stay focused\"

## 2022-04-21 NOTE — GROUP NOTE
Group Therapy Note    Date: 4/21/2022    Group Start Time: 1800  Group End Time: 8702  Group Topic: Recreational    [facility] [address]        Group Therapy Note    Attendees: 12         Notes: Active and engaged during coping skills jeopardy. Pleasant and social, positively identifying skills necessary to relax. Enjoyed jeopardy game, laughing with peers. Status After Intervention:  Improved    Participation Level:  Active Listener and Interactive    Participation Quality: Appropriate, Attentive, Sharing and Supportive      Speech:  normal      Thought Process/Content: Logical      Affective Functioning: Congruent      Mood: euthymic      Level of consciousness:  Alert and Attentive      Response to Learning: Capable of insight, Able to change behavior and Progressing to goal      Endings: None Reported    Modes of Intervention: Education, Support, Socialization, Exploration and Activity      Discipline Responsible: Psychoeducational Specialist      Signature:  Adeel Daley, [address]

## 2022-04-21 NOTE — GROUP NOTE
Group Therapy Note    Date: 4/21/2022    Group Start Time: 1100  Group End Time: 1150  Group Topic: Psychotherapy    [facility]    YNES Coley LSW        Group Therapy Note    Attendees: 5         Patient's Goal: To increase social interaction and improve relationships with others    Notes: Pt was attentive in group and was able to identify an agenda. She was also able to verbalize relating to others within the group. Status After Intervention:  Improved    Participation Level:  Active Listener and Interactive    Participation Quality: Appropriate, Attentive, Sharing and Supportive      Speech:  normal      Thought Process/Content: Logical      Affective Functioning: Congruent      Mood: anxious      Level of consciousness:  Alert and Oriented x4      Response to Learning: Able to verbalize current knowledge/experience      Endings: None Reported    Modes of Intervention: Support, Socialization and Exploration      Discipline Responsible: /Counselor      Signature:  YNES Wilson LSW

## 2022-04-21 NOTE — PROGRESS NOTES
Spiritual Support Group Note    Number of Participants in Group:    7                    Time:   2    Goal: Relief from isolation and loneliness             Fabi Sharing             Self-understanding and gain insight              Acceptance and belonging            Recognize they are not alone                Socialization             Empowerment       Encouragement    Topic:  [x] Spiritual Wellness and Self Care                  [x] Hope                     [] Connecting with Divine/Others        [] Thankfulness and Gratitude               []  Meaningfulness and Purpose               [] Forgiveness               [] Peace               [] Connect to [facility]      [] Other    Participation Level:   [x] Active Listener   [] Minimal   [] Monopolizing   [] Interactive   [] No Participation   []  Other:     Attention:   [x] Alert   [] Distractible   [] Drowsy   [] Poor   [] Other:    Manner:   [x] Cooperative   [] Suspicious   [] Withdrawn   [] Guarded   [] Irritable   [] Inhospitable   [] Other:     Others Comments from Group:

## 2022-04-21 NOTE — GROUP NOTE
Group Therapy Note    Date: 4/21/2022    Group Start Time: 1000  Group End Time: 1050  Group Topic: Psychoeducation    [facility] [address]        Group Therapy Note    Attendees: 15         Notes: Active and engaged during discussion on self esteem. Pleasant in sharing experiences with peers. Status After Intervention:  Improved    Participation Level:  Active Listener and Interactive    Participation Quality: Appropriate, Attentive and Sharing      Speech:  normal      Thought Process/Content: Logical      Affective Functioning: Congruent      Mood: euthymic      Level of consciousness:  Alert and Attentive      Response to Learning: Progressing to goal      Endings: None Reported    Modes of Intervention: Education, Support, Socialization and Exploration      Discipline Responsible: Psychoeducational Specialist      Signature:  Vanessa Lamb, [address]

## 2022-04-21 NOTE — PLAN OF CARE
Problem: Altered Mood, Psychotic Behavior:  Goal: Able to verbalize decrease in frequency and intensity of hallucinations  Description: Able to verbalize decrease in frequency and intensity of hallucinations  4/21/2022 1336 by Troy Liz RN  Outcome: Not Progressing  4/21/2022 0129 by Cesario Ahumada, RN  Outcome: Progressing     Problem: Altered Mood, Psychotic Behavior:  Goal: Able to verbalize reality based thinking  Description: Able to verbalize reality based thinking  4/21/2022 1336 by Troy Liz RN  Outcome: Not Progressing  4/21/2022 0129 by Cesario Ahumada, RN  Outcome: Progressing           Patient flat and depressed. Delayed with conversation. Reporting auditory hallucinations listening to her conversations. Denies suicidal and homicidal thoughts.

## 2022-04-21 NOTE — PROGRESS NOTES
BEHAVIORAL HEALTH FOLLOW-UP NOTE     4/21/2022     Patient was seen and examined in person, Chart reviewed   Patient's case discussed with staff/team    Chief Complaint: \"The voices are listening to me\"    Interim History: Patient up on the unit she is out social with some peers. She states the voices are \"listening to her. \"  She denies SI/HI intent or plan denies any visual hallucinations but she does endorse auditory hallucinations. Affect is flat and blunted she is encouraged to attend groups.   No behavioral disturbances on the unit she is agreeable to long-acting injection    Appetite:   [x] Normal/Unchanged  [x] Increased  [] Decreased      Sleep:       [x] Normal/Unchanged  [] Fair       [] Poor              Energy:    [x] Normal/Unchanged  [] Increased  [] Decreased        SI [] Present  [x] Absent    HI  []Present  [x] Absent     Aggression:  [] yes  [x] no    Patient is [x] able  [] unable to CONTRACT FOR SAFETY     PAST MEDICAL/PSYCHIATRIC HISTORY:   Past Medical History:   Diagnosis Date    Concussion with loss of consciousness of 30 minutes or less 6/30/2018    Herpes genitalia     [facility] [facility]     Seizures ([facility] [address]     Ulcer        FAMILY/SOCIAL HISTORY:  Family History   Problem Relation Age of Onset    Diabetes Mother     Diabetes Father     Other Brother     Hypertension Maternal Grandmother      Social History     Socioeconomic History    Marital status: Single     Spouse name: Not on file    Number of children: 1    Years of education: 8    Highest education level: 10th grade   Occupational History    Not on file   Tobacco Use    Smoking status: Current Some Day Smoker     Types: Cigarettes    Smokeless tobacco: Never Used    Tobacco comment: Started smoking again after mom passed away  2021   Vaping Use    Vaping Use: Never used   Substance and Sexual Activity    Alcohol use: Yes     Comment: occasionally-holidays    Drug use: No    Sexual activity: Yes Partners: Male   Other Topics Concern    Not on file   Social History Narrative    2/24/2021-Stress-pt reports that she is very much stressed-pt recently lost her mother and she has been having a hard time dealing with her loss. Pt is currently receiving grief counseling through [facility] in [place] [id number] Determinants of Health     Financial Resource Strain:     Difficulty of Paying Living Expenses: Not on file   Food Insecurity:     Worried About Running Out of Food in the Last Year: Not on file    Suzanne of Food in the Last Year: Not on file   Transportation Needs:     Lack of Transportation (Medical): Not on file    Lack of Transportation (Non-Medical): Not on file   Physical Activity:     Days of Exercise per Week: Not on file    Minutes of Exercise per Session: Not on file   Stress:     Feeling of Stress : Not on file   Social Connections:     Frequency of Communication with Friends and Family: Not on file    Frequency of Social Gatherings with Friends and Family: Not on file    Attends Alevism Services: Not on file    Active Member of [address] [place] [address] [employer] or Organizations: Not on file    Attends Club or Organization Meetings: Not on file    Marital Status: Not on file   Intimate Partner Violence:     Fear of Current or Ex-Partner: Not on file    Emotionally Abused: Not on file    Physically Abused: Not on file    Sexually Abused: Not on file   Housing Stability:     Unable to Pay for Housing in the Last Year: Not on file    Number of [place] in the Last Year: Not on file    Unstable Housing in the Last Year: Not on file           ROS:  [x] All negative/unchanged except if checked.  Explain positive(checked items) below:  [] Constitutional  [] Eyes  [] Ear/Nose/Mouth/Throat  [] Respiratory  [] CV  [] GI  []   [] Musculoskeletal  [] Skin/Breast  [] Neurological  [] Endocrine  [] Heme/Lymph  [] Allergic/Immunologic    Explanation:     MEDICATIONS:    Current Facility-Administered Medications:   risperiDONE (RISPERDAL M-TABS) disintegrating tablet 3 mg, 3 mg, Oral, Nightly, AKHIL Moreland - CNP    risperiDONE (RISPERDAL M-TABS) disintegrating tablet 1 mg, 1 mg, Oral, Daily, AKHIL Maharaj - CNP, 1 mg at 04/21/22 0956    OXcarbazepine (TRILEPTAL) tablet 300 mg, 300 mg, Oral, Daily, AKHIL Maharaj - GINI, 095 mg at 04/21/22 0956    melatonin tablet 3 mg, 3 mg, Oral, Nightly PRN, AKHIL Desir - CNP    buprenorphine-naloxone (SUBOXONE) 8-2 MG SL tablet 1 tablet, 1 tablet, SubLINGual, Daily, Dayanna Kidd, AKHIL - CNP, 1 tablet at 04/21/22 0956    acetaminophen (TYLENOL) tablet 650 mg, 650 mg, Oral, M9I PRN, Julissa Ren APRN - CNP    magnesium hydroxide (MILK OF MAGNESIA) 400 MG/5ML suspension 30 mL, 30 mL, Oral, Daily PRN, Julissa Ren APRN - CNP    aluminum & magnesium hydroxide-simethicone (MAALOX) 200-200-20 MG/5ML suspension 30 mL, 30 mL, Oral, PRN, Julissa Ren APRN - CNP    haloperidol (HALDOL) tablet 5 mg, 5 mg, Oral, Q6H PRN **OR** haloperidol lactate (HALDOL) injection 5 mg, 5 mg, IntraMUSCular, M2M PRN, AKHIL Maharaj - CNP, 5 mg at 04/18/22 2132    melatonin tablet 3 mg, 3 mg, Oral, Nightly, Julissa Ren, AKHIL - CNP, 3 mg at 04/20/22 2119    chlordiazePOXIDE (LIBRIUM) capsule 25 mg, 25 mg, Oral, C8D PRN, Julissa Ren APRN - CNP      Examination:  /63   Pulse 66   Temp 98.5 °F (36.9 °C) (Oral)   Resp 14   Ht 5' 5\" (1.651 m)   Wt 230 lb (104.3 kg)   SpO2 97%   BMI 38.27 kg/m²   Gait - steady  Medication side effects(SE): denies    Mental Status Examination:    Level of consciousness:  within normal limits   Appearance:  fair grooming and fair hygiene  Behavior/Motor:  no abnormalities noted  Attitude toward examiner:  cooperative  Speech:  spontaneous, normal rate and normal volume   Mood: \" I am okay. \"  Affect: Flat and blunted  Thought processes: Slow  Thought content: With auditory hallucinations of voices of demons denies SI/HI intent or plan  Cognition:  oriented to person, place, and time   Concentration intact  Insight poor   Judgement poor     ASSESSMENT:   Patient symptoms are:  [] Well controlled  [] Improving  [] Worsening  [x] No change      Diagnosis:  Principal Problem:    Paranoid schizophrenia ([facility] [address]  Active Problems:    Alcohol abuse  Resolved Problems:    * No resolved hospital problems. *      LABS:    No results for input(s): WBC, HGB, PLT in the last 72 hours. No results for input(s): NA, K, CL, CO2, BUN, CREATININE, GLUCOSE in the last 72 hours. No results for input(s): BILITOT, ALKPHOS, AST, ALT in the last 72 hours. Lab Results   Component Value Date    LABAMPH NOT DETECTED 04/18/2022    [address] NOT DETECTED 07/30/2011    BARBSCNU NOT DETECTED 04/18/2022    LABBENZ NOT DETECTED 04/18/2022    LABBENZ SEE BELOW 08/29/2014    CANNAB POSITIVE  07/30/2011    LABMETH NOT DETECTED 04/18/2022    OPIATESCREENURINE NOT DETECTED 04/18/2022    PHENCYCLIDINESCREENURINE NOT DETECTED 04/18/2022    ETOH 17 04/18/2022     Lab Results   Component Value Date    TSH 0.899 07/14/2020     No results found for: LITHIUM  No results found for: VALPROATE, CBMZ        Treatment Plan:  Reviewed current Medications with the patient. Risks, benefits, side effects, drug-to-drug interactions and alternatives to treatment were discussed. Collateral information:   CD evaluation  Encourage patient to attend group and other milieu activities.   Discharge planning discussed with the patient and treatment team.    Continue Suboxone 8/2 daily  Continue Trileptal 300 mg twice daily  Risperdal 3 mg at bedtime I will plan for the Perseris injection tomorrow    PSYCHOTHERAPY/COUNSELING:  [x] Therapeutic interview  [x] Supportive  [] CBT  [] Ongoing  [] Other    [x] Patient continues to need, on a daily basis, active treatment furnished directly by or requiring the supervision of inpatient psychiatric personnel      Anticipated Length of stay: 3 to 7 days based on stability            Electronically signed by AKHIL Neely CNP on 8/11/2387 at 10:11 AM

## 2022-04-22 PROCEDURE — 6360000002 HC RX W HCPCS: Performed by: NURSE PRACTITIONER

## 2022-04-22 PROCEDURE — 1240000000 HC EMOTIONAL WELLNESS R&B

## 2022-04-22 PROCEDURE — 6370000000 HC RX 637 (ALT 250 FOR IP): Performed by: NURSE PRACTITIONER

## 2022-04-22 PROCEDURE — 6370000000 HC RX 637 (ALT 250 FOR IP): Performed by: PSYCHIATRY & NEUROLOGY

## 2022-04-22 PROCEDURE — 99232 SBSQ HOSP IP/OBS MODERATE 35: CPT | Performed by: NURSE PRACTITIONER

## 2022-04-22 RX ADMIN — Medication 3 MG: at 21:23

## 2022-04-22 RX ADMIN — HYDROXYZINE PAMOATE 50 MG: 50 CAPSULE ORAL at 21:23

## 2022-04-22 RX ADMIN — OXCARBAZEPINE 300 MG: 300 TABLET, FILM COATED ORAL at 09:35

## 2022-04-22 RX ADMIN — BUPRENORPHINE AND NALOXONE 1 TABLET: 8; 2 TABLET SUBLINGUAL at 09:35

## 2022-04-22 ASSESSMENT — PAIN SCALES - GENERAL: PAINLEVEL_OUTOF10: 0

## 2022-04-22 NOTE — PROGRESS NOTES
BEHAVIORAL HEALTH FOLLOW-UP NOTE     4/22/2022     Patient was seen and examined in person, Chart reviewed   Patient's case discussed with staff/team    Chief Complaint: \"I am feeling better\"    Interim History: Patient up on the with a bright pleasant affect. She vehemently denies SI/HI intent or plan she is discharge focused she wants to get back home to her children. She states she is feeling better. She states that she is no longer hearing the voices and is agreeable to long-acting injection she denies auditory visualizations today no overt or covert signs psychosis attending groups social select peers on the unit.     Appetite:   [x] Normal/Unchanged  [x] Increased  [] Decreased      Sleep:       [x] Normal/Unchanged  [] Fair       [] Poor              Energy:    [x] Normal/Unchanged  [] Increased  [] Decreased        SI [] Present  [x] Absent    HI  []Present  [x] Absent     Aggression:  [] yes  [x] no    Patient is [x] able  [] unable to CONTRACT FOR SAFETY     PAST MEDICAL/PSYCHIATRIC HISTORY:   Past Medical History:   Diagnosis Date    Concussion with loss of consciousness of 30 minutes or less 6/30/2018    Herpes genitalia     [facility]an[facility] ([facility] [address]     Seizures ([facility] [address]     Ulcer        FAMILY/SOCIAL HISTORY:  Family History   Problem Relation Age of Onset    Diabetes Mother     Diabetes Father     Other Brother     Hypertension Maternal Grandmother      Social History     Socioeconomic History    Marital status: Single     Spouse name: Not on file    Number of children: 1    Years of education: 8    Highest education level: 10th grade   Occupational History    Not on file   Tobacco Use    Smoking status: Current Some Day Smoker     Types: Cigarettes    Smokeless tobacco: Never Used    Tobacco comment: Started smoking again after mom passed away  2021   Vaping Use    Vaping Use: Never used   Substance and Sexual Activity    Alcohol use: Yes     Comment: occasionally-holidays    Drug use: No    Sexual activity: Yes     Partners: Male   Other Topics Concern    Not on file   Social History Narrative    2/24/2021-Stress-pt reports that she is very much stressed-pt recently lost her mother and she has been having a hard time dealing with her loss. Pt is currently receiving grief counseling through [facility] in [place] [address] Determinants of Health     Financial Resource Strain:     Difficulty of Paying Living Expenses: Not on file   Food Insecurity:     Worried About Running Out of Food in the Last Year: Not on file    Suzanne of Food in the Last Year: Not on file   Transportation Needs:     Lack of Transportation (Medical): Not on file    Lack of Transportation (Non-Medical): Not on file   Physical Activity:     Days of Exercise per Week: Not on file    Minutes of Exercise per Session: Not on file   Stress:     Feeling of Stress : Not on file   Social Connections:     Frequency of Communication with Friends and Family: Not on file    Frequency of Social Gatherings with Friends and Family: Not on file    Attends Religion Services: Not on file    Active Member of [address] [place] [address] [employer] or Organizations: Not on file    Attends Club or Organization Meetings: Not on file    Marital Status: Not on file   Intimate Partner Violence:     Fear of Current or Ex-Partner: Not on file    Emotionally Abused: Not on file    Physically Abused: Not on file    Sexually Abused: Not on file   Housing Stability:     Unable to Pay for Housing in the Last Year: Not on file    Number of [place] in the Last Year: Not on file    Unstable Housing in the Last Year: Not on file           ROS:  [x] All negative/unchanged except if checked.  Explain positive(checked items) below:  [] Constitutional  [] Eyes  [] Ear/Nose/Mouth/Throat  [] Respiratory  [] CV  [] GI  []   [] Musculoskeletal  [] Skin/Breast  [] Neurological  [] Endocrine  [] Heme/Lymph  [] Allergic/Immunologic    Explanation: MEDICATIONS:    Current Facility-Administered Medications:     risperiDONE ER (PERSERIS) injection 120 mg, 120 mg, SubCUTAneous, Q30 Days, AKHIL Moreland CNP    acetaminophen (TYLENOL) tablet 650 mg, 650 mg, Oral, Q6H PRN, Brennon Carlton MD    magnesium hydroxide (MILK OF MAGNESIA) 400 MG/5ML suspension 30 mL, 30 mL, Oral, Daily PRN, Brennon Carlton MD    nicotine (NICODERM CQ) 21 MG/24HR 1 patch, 1 patch, TransDERmal, Daily, Brennon Carlton MD    aluminum & magnesium hydroxide-simethicone (MAALOX) 200-200-20 MG/5ML suspension 30 mL, 30 mL, Oral, PRN, Brennon Carlton MD    haloperidol (HALDOL) tablet 5 mg, 5 mg, Oral, Q6H PRN **OR** haloperidol lactate (HALDOL) injection 5 mg, 5 mg, IntraMUSCular, Q6H PRN, Brennon Carlton MD    melatonin tablet 3 mg, 3 mg, Oral, Nightly, Brennon Carlton MD, 3 mg at 04/21/22 2123    hydrOXYzine (VISTARIL) capsule 50 mg, 50 mg, Oral, TID PRN, Brennon Carlton MD    OXcarbazepine (TRILEPTAL) tablet 300 mg, 300 mg, Oral, Daily, AKHIL Tim CNP, 317 mg at 04/21/22 0956    melatonin tablet 3 mg, 3 mg, Oral, Nightly PRN, AKHIL Desir CNP    buprenorphine-naloxone (SUBOXONE) 8-2 MG SL tablet 1 tablet, 1 tablet, SubLINGual, Daily, AKHIL Garcia CNP, 1 tablet at 04/21/22 6974    chlordiazePOXIDE (LIBRIUM) capsule 25 mg, 25 mg, Oral, X3W PRN, AKHIL Tim CNP      Examination:  BP (!) 90/50   Pulse 70   Temp 97.7 °F (36.5 °C) (Oral)   Resp 15   Ht 5' 5\" (1.651 m)   Wt 230 lb (104.3 kg)   SpO2 97%   BMI 38.27 kg/m²   Gait - steady  Medication side effects(SE): denies    Mental Status Examination:    Level of consciousness:  within normal limits   Appearance:  fair grooming and fair hygiene  Behavior/Motor:  no abnormalities noted  Attitude toward examiner:  cooperative  Speech:  spontaneous, normal rate and normal volume   Mood: \" I am okay. \"  Affect: Flat and blunted  Thought processes: Slow  Thought content: With auditory hallucinations of voices of demons denies SI/HI intent or plan  Cognition:  oriented to person, place, and time   Concentration intact  Insight poor   Judgement poor     ASSESSMENT:   Patient symptoms are:  [] Well controlled  [] Improving  [] Worsening  [x] No change      Diagnosis:  Principal Problem:    Paranoid schizophrenia ([facility] [address]  Active Problems:    Major depressive episode    Alcohol abuse  Resolved Problems:    * No resolved hospital problems. *      LABS:    No results for input(s): WBC, HGB, PLT in the last 72 hours. No results for input(s): NA, K, CL, CO2, BUN, CREATININE, GLUCOSE in the last 72 hours. No results for input(s): BILITOT, ALKPHOS, AST, ALT in the last 72 hours. Lab Results   Component Value Date    LABAMPH NOT DETECTED 04/18/2022    [facility] NOT DETECTED 07/30/2011    BARBSCNU NOT DETECTED 04/18/2022    LABBENZ NOT DETECTED 04/18/2022    LABBENZ SEE BELOW 08/29/2014    CANNAB POSITIVE  07/30/2011    LABMETH NOT DETECTED 04/18/2022    OPIATESCREENURINE NOT DETECTED 04/18/2022    PHENCYCLIDINESCREENURINE NOT DETECTED 04/18/2022    ETOH 17 04/18/2022     Lab Results   Component Value Date    TSH 0.899 07/14/2020     No results found for: LITHIUM  No results found for: VALPROATE, CBMZ        Treatment Plan:  Reviewed current Medications with the patient. Risks, benefits, side effects, drug-to-drug interactions and alternatives to treatment were discussed. Collateral information:   CD evaluation  Encourage patient to attend group and other milieu activities.   Discharge planning discussed with the patient and treatment team.    Continue Suboxone 8/2 daily  Continue Trileptal 300 mg twice daily  Perseris 120 mg IM every 30 days    PSYCHOTHERAPY/COUNSELING:  [x] Therapeutic interview  [x] Supportive  [] CBT  [] Ongoing  [] Other    [x] Patient continues to need, on a daily basis, active treatment furnished directly by or requiring the supervision of inpatient psychiatric personnel      Anticipated Length of stay: 3 to 7 days based on stability            Electronically signed by AKHIL Shankar CNP on 2/85/6483 at 8:17 AM

## 2022-04-22 NOTE — PLAN OF CARE
Patient denies suicidal ideation, homicidal ideations and AVH. Patient denies anxiety and depression. Patient is flat, sad anxious and depressed but does brighten slightly with conversation. Patient states she is \"feeling better. \"  Patient asked if she still having auditory hallucinations or that they are listening to her conversations; patient denies any hallucinations but states \"I still want my meds tomorrow so I don't have them. \"  Presents calm and cooperative during assessment. Patient is out on the unit and is social with select peers. Medications taken without issue. No complaints or concerns verbalized at this time. No unit problems reported. Will continue to observe and support.     Problem: Altered Mood, Psychotic Behavior:  Goal: Able to verbalize decrease in frequency and intensity of hallucinations  Description: Able to verbalize decrease in frequency and intensity of hallucinations  4/21/2022 2048 by Ira Viera RN  Outcome: Progressing     Problem: Altered Mood, Psychotic Behavior:  Goal: Able to verbalize reality based thinking  Description: Able to verbalize reality based thinking  4/21/2022 2048 by Ira Viera RN  Outcome: Progressing     Problem: Altered Mood, Psychotic Behavior:  Goal: Absence of self-harm  Description: Absence of self-harm  Outcome: Progressing     Problem: Altered Mood, Psychotic Behavior:  Goal: Ability to achieve adequate nutritional intake will improve  Description: Ability to achieve adequate nutritional intake will improve  Outcome: Progressing

## 2022-04-22 NOTE — GROUP NOTE
Group Therapy Note    Date: 4/22/2022    Group Start Time: 1100  Group End Time: 1150  Group Topic: Psychotherapy    [facility]    YNES Scruggs LSW        Group Therapy Note    Attendees: 7         Patient's Goal:  To increase social interaction and improve relationships with others. Notes:  Patient able to set an agenda, participated well and provided support to peers. Status After Intervention:  Improved    Participation Level:  Active Listener and Interactive    Participation Quality: Appropriate, Attentive and Sharing      Speech:  hesitant      Thought Process/Content: Flight of ideas      Affective Functioning: Blunted      Mood: anxious, depressed and irritable      Level of consciousness:  Alert, Oriented x4 and Attentive      Response to Learning: Able to retain information      Endings: None Reported    Modes of Intervention: Support, Socialization and Exploration      Discipline Responsible: /Counselor      Signature:  YNES Scruggs LSW

## 2022-04-22 NOTE — CARE COORDINATION
Sw met with pt. Pt observed socializing with peers. Pt reports feeling good today, denied SI/HI/AVH. Pt stated her voices are gone and she is feeling better. Pt plans to discharge home to her apartment or to get step-moms. Pt plans to follow up with [facility] and [facility]. Pt is brighter, pleasant, cooperative, with good eye contact, clear speech, and improved insight/judgement. Sw contacted [facility] and scheduled follow up appointments on 4/25 for counseling and 5/11 for medication management. Saurav Crowe is aware that pt will be due for her Perseris ANDRADE on 5/22.

## 2022-04-22 NOTE — GROUP NOTE
Date: 4/22/2022    Group Start Time: 0930  Group End Time: 5468  Group Topic: Psychoeducation    [facility]    Josemanuel Briseno, CTRS                                                                        Group Therapy Note      Type of Group: Psychoeducation    Wellness Binder Information  Module Name:  3 steps to happiness  Patient's Goal: Patient will be able to id 3 steps to help increase ones happiness daily. Notes:  pleasant and sharing when prompted. Able to share what he/she is grateful for today. Status After Intervention:  Improved    Participation Level:  Active Listener and Interactive    Participation Quality: Appropriate, Attentive, and Sharing      Speech:  normal       Thought Process/Content: Logical      Affective Functioning: Congruent      Mood: euthymic      Level of consciousness:  Alert, Oriented x4, and Attentive      Response to Learning: Able to verbalize/acknowledge new learning, Able to retain information, and Progressing to goal      Endings: None Reported    Modes of Intervention: Education, Support, Socialization, Exploration, and Problem-solving      Discipline Responsible: Psychoeducational Specialist      Signature:  Tali Wood

## 2022-04-22 NOTE — PLAN OF CARE
Problem: Altered Mood, Psychotic Behavior:  Goal: Able to verbalize decrease in frequency and intensity of hallucinations  Description: Able to verbalize decrease in frequency and intensity of hallucinations  Outcome: Progressing  Goal: Able to verbalize reality based thinking  Description: Able to verbalize reality based thinking  Outcome: Progressing  Goal: Absence of self-harm  Description: Absence of self-harm  Outcome: Progressing        Patient pleasant. Still appears sad. Reports feeling better. No reported complaints. Denies suicidal and homicidal thoughts. Denies hallucinations.

## 2022-04-23 PROCEDURE — 6360000002 HC RX W HCPCS: Performed by: NURSE PRACTITIONER

## 2022-04-23 PROCEDURE — 6370000000 HC RX 637 (ALT 250 FOR IP): Performed by: PSYCHIATRY & NEUROLOGY

## 2022-04-23 PROCEDURE — 99231 SBSQ HOSP IP/OBS SF/LOW 25: CPT | Performed by: NURSE PRACTITIONER

## 2022-04-23 PROCEDURE — 1240000000 HC EMOTIONAL WELLNESS R&B

## 2022-04-23 PROCEDURE — 6370000000 HC RX 637 (ALT 250 FOR IP): Performed by: NURSE PRACTITIONER

## 2022-04-23 PROCEDURE — 6360000002 HC RX W HCPCS: Performed by: PSYCHIATRY & NEUROLOGY

## 2022-04-23 RX ORDER — LORAZEPAM 2 MG/ML
1 INJECTION INTRAMUSCULAR EVERY 6 HOURS PRN
Status: DISCONTINUED | OUTPATIENT
Start: 2022-04-23 | End: 2022-04-26 | Stop reason: HOSPADM

## 2022-04-23 RX ORDER — DIPHENHYDRAMINE HYDROCHLORIDE 50 MG/ML
50 INJECTION INTRAMUSCULAR; INTRAVENOUS EVERY 6 HOURS PRN
Status: DISCONTINUED | OUTPATIENT
Start: 2022-04-23 | End: 2022-04-26 | Stop reason: HOSPADM

## 2022-04-23 RX ADMIN — OXCARBAZEPINE 300 MG: 300 TABLET, FILM COATED ORAL at 09:25

## 2022-04-23 RX ADMIN — BUPRENORPHINE AND NALOXONE 1 TABLET: 8; 2 TABLET SUBLINGUAL at 09:25

## 2022-04-23 RX ADMIN — DIPHENHYDRAMINE HYDROCHLORIDE 50 MG: 50 INJECTION, SOLUTION INTRAMUSCULAR; INTRAVENOUS at 18:20

## 2022-04-23 RX ADMIN — LORAZEPAM 1 MG: 2 INJECTION INTRAMUSCULAR; INTRAVENOUS at 18:20

## 2022-04-23 RX ADMIN — HALOPERIDOL LACTATE 5 MG: 5 INJECTION, SOLUTION INTRAMUSCULAR at 18:20

## 2022-04-23 RX ADMIN — Medication 3 MG: at 21:03

## 2022-04-23 NOTE — PROGRESS NOTES
Attended morning community meeting. Updated on staffing and daily expectations. Shared goal for the day as to  Stay positive and in a good mood.

## 2022-04-23 NOTE — CARE COORDINATION
SW gained collateral from pt's step-mother, Aminta. Rania states that she feels that this pt is doing much better after her injection. Rania states that she has no immediate concerns for this pt, and is able to return home with her at discharge. Rania states that someone in the family will provide transportation on the day of discharge. Pt does not have access to weapons per Ranisia.

## 2022-04-23 NOTE — PROGRESS NOTES
BEHAVIORAL HEALTH FOLLOW-UP NOTE     4/23/2022     Patient was seen and examined in person, Chart reviewed   Patient's case discussed with staff/team    Chief Complaint: \"I am going to go with my step mom\"    Interim History: Patient up on the unit, she is observed interacting appropriately with peers and staff. She vehemently denies SI/HI intent or plan she is discharge focused she wants to get back home to her children. She states she is feeling better. Tells me that she will be staying with her stepmom on discharge and that she has been talking to her stepmother since she has been here. Patient encouraged to have her stepmother call in to speak with social work. She states that she is no longer hearing the voices and is agreeable to long-acting injection, which she received yesterday and is tolerating well. She denies auditory visualizations today no overt or covert signs psychosis.      Appetite:   [x] Normal/Unchanged  [x] Increased  [] Decreased      Sleep:       [x] Normal/Unchanged  [] Fair       [] Poor              Energy:    [x] Normal/Unchanged  [] Increased  [] Decreased        SI [] Present  [x] Absent    HI  []Present  [x] Absent     Aggression:  [] yes  [x] no    Patient is [x] able  [] unable to CONTRACT FOR SAFETY     PAST MEDICAL/PSYCHIATRIC HISTORY:   Past Medical History:   Diagnosis Date    Concussion with loss of consciousness of 30 minutes or less 6/30/2018    Herpes genitalia     [facility]     Seizures ([facility]     Ulcer        FAMILY/SOCIAL HISTORY:  Family History   Problem Relation Age of Onset    Diabetes Mother     Diabetes Father     Other Brother     Hypertension Maternal Grandmother      Social History     Socioeconomic History    Marital status: Single     Spouse name: Not on file    Number of children: 1    Years of education: 8    Highest education level: 10th grade   Occupational History    Not on file   Tobacco Use    Smoking status: Current Some Day Smoker     Types: Cigarettes    Smokeless tobacco: Never Used    Tobacco comment: Started smoking again after mom passed away  2021   Vaping Use    Vaping Use: Never used   Substance and Sexual Activity    Alcohol use: Yes     Comment: occasionally-holidays    Drug use: No    Sexual activity: Yes     Partners: Male   Other Topics Concern    Not on file   Social History Narrative    2/24/2021-Stress-pt reports that she is very much stressed-pt recently lost her mother and she has been having a hard time dealing with her loss. Pt is currently receiving grief counseling through [place] [id number] Determinants of Health     Financial Resource Strain:     Difficulty of Paying Living Expenses: Not on file   Food Insecurity:     Worried About Running Out of Food in the Last Year: Not on file    Suzanne of Food in the Last Year: Not on file   Transportation Needs:     Lack of Transportation (Medical): Not on file    Lack of Transportation (Non-Medical): Not on file   Physical Activity:     Days of Exercise per Week: Not on file    Minutes of Exercise per Session: Not on file   Stress:     Feeling of Stress : Not on file   Social Connections:     Frequency of Communication with Friends and Family: Not on file    Frequency of Social Gatherings with Friends and Family: Not on file    Attends Nondenominational Services: Not on file    Active Member of [address] [place] [address] or Organizations: Not on file    Attends Club or Organization Meetings: Not on file    Marital Status: Not on file   Intimate Partner Violence:     Fear of Current or Ex-Partner: Not on file    Emotionally Abused: Not on file    Physically Abused: Not on file    Sexually Abused: Not on file   Housing Stability:     Unable to Pay for Housing in the Last Year: Not on file    Number of [place] in the Last Year: Not on file    Unstable Housing in the Last Year: Not on file           ROS:  [x] All negative/unchanged except if checked. Explain positive(checked items) below:  [] Constitutional  [] Eyes  [] Ear/Nose/Mouth/Throat  [] Respiratory  [] CV  [] GI  []   [] Musculoskeletal  [] Skin/Breast  [] Neurological  [] Endocrine  [] Heme/Lymph  [] Allergic/Immunologic    Explanation:     MEDICATIONS:    Current Facility-Administered Medications:     risperiDONE ER (PERSERIS) injection 120 mg, 120 mg, SubCUTAneous, Q30 Days, AKHIL Ruiz - CNP, 175 mg at 04/22/22 1223    acetaminophen (TYLENOL) tablet 650 mg, 650 mg, Oral, Q6H PRN, Kala Jensen MD    magnesium hydroxide (MILK OF MAGNESIA) 400 MG/5ML suspension 30 mL, 30 mL, Oral, Daily PRN, Kala Jensen MD    nicotine (NICODERM CQ) 21 MG/24HR 1 patch, 1 patch, TransDERmal, Daily, Kala Jensen MD, 1 patch at 04/23/22 0925    aluminum & magnesium hydroxide-simethicone (MAALOX) 200-200-20 MG/5ML suspension 30 mL, 30 mL, Oral, PRN, Kala Jensen MD    haloperidol (HALDOL) tablet 5 mg, 5 mg, Oral, Q6H PRN **OR** haloperidol lactate (HALDOL) injection 5 mg, 5 mg, IntraMUSCular, Q6H PRN, Kala Jensen MD    melatonin tablet 3 mg, 3 mg, Oral, Nightly, Kala Jensen MD, 3 mg at 04/22/22 2123    hydrOXYzine (VISTARIL) capsule 50 mg, 50 mg, Oral, TID PRN, Kala Jensen MD, 50 mg at 04/22/22 2123    OXcarbazepine (TRILEPTAL) tablet 300 mg, 300 mg, Oral, Daily, Minh Ren APRN - CNP, 328 mg at 04/23/22 0925    melatonin tablet 3 mg, 3 mg, Oral, Nightly PRN, Dayanna Tobar APRN - CNP    buprenorphine-naloxone (SUBOXONE) 8-2 MG SL tablet 1 tablet, 1 tablet, SubLINGual, Daily, Dayanna Nunez, APRN - CNP, 1 tablet at 04/23/22 0925    chlordiazePOXIDE (LIBRIUM) capsule 25 mg, 25 mg, Oral, B9P PRN, Minh Ren, APRN - CNP      Examination:  BP (!) 95/54   Pulse 69   Temp 98.9 °F (37.2 °C)   Resp 14   Ht 5' 5\" (1.651 m)   Wt 230 lb (104.3 kg)   SpO2 100%   BMI 38.27 kg/m²   Gait - steady  Medication side effects(SE): denies    Mental Status and treatment team.    Continue Suboxone 8/2 daily  Continue Trileptal 300 mg twice daily  Perseris 120 mg IM every 30 days first dose on 4/22/2022 patient tolerated well    PSYCHOTHERAPY/COUNSELING:  [x] Therapeutic interview  [x] Supportive  [] CBT  [] Ongoing  [] Other    [x] Patient continues to need, on a daily basis, active treatment furnished directly by or requiring the supervision of inpatient psychiatric personnel      Anticipated Length of stay: 3 to 7 days based on stability    NOTE: This report was transcribed using voice recognition software.  Every effort was made to ensure accuracy; however, inadvertent computerized transcription errors may be present.       Electronically signed by AKHIL Gaston CNP on 4/23/2022 at 11:30 AM

## 2022-04-23 NOTE — BH NOTE
Pt approached the nurses station loudly demanding to know why she was not discharged today. Pt informed that she did not have a discharge order for today. Pt then loudly demanded to know \"why she had the  call her mom if she wasn't going to be discharged today? \" pt educated on the discharge process of needing collateral information from the patients family for a safe discharge. Pt then raised her voice and had an increase in her gross motor activity and started yelling at multiple nurse's demanding to be discharged yelling out \"Y'all lied to me I'm going to freddy this place and all these mother fuckers! I have money! I got an ! NP called for additional PRN med orders and informed of pt situation. [place] [employer] called up to bedside and pt given PRN med's IM at bedside.

## 2022-04-23 NOTE — GROUP NOTE
Group Therapy Note    Date: 4/23/2022    Group Start Time: 1100  Group End Time: 1106  Group Topic: Psychoeducation    [facility]    YNES Sarmiento LSW        Group Therapy Note    Attendees: 15         Patient's Goal:  Pt will be able to verbalize understanding regarding the importance of setting boundaries. Notes:  Pt made connections and participated in group. Status After Intervention:  Improved    Participation Level:  Active Listener and Interactive    Participation Quality: Appropriate, Attentive, Sharing and Supportive      Speech:  normal      Thought Process/Content: Logical  Linear      Affective Functioning: Congruent      Mood: depressed      Level of consciousness:  Alert, Oriented x4 and Attentive      Response to Learning: Able to verbalize current knowledge/experience      Endings: None Reported    Modes of Intervention: Education, Support, Socialization and Exploration      Discipline Responsible: /Counselor      Signature:  YNES Sarmiento LSW

## 2022-04-23 NOTE — BH NOTE
Pt denies si/hi and hallucinations. Pt has a flat affect. Pt has poor ADL's. Pt has underlying irritability. Pt has a constricted affect. Pt states her sleep is improved from the melatonin and vistaril she takes before bed. Pt has poor eye contact. Pt out on the unit and is social with peers. Pt takes med's and attends select groups.

## 2022-04-23 NOTE — GROUP NOTE
Date: 4/23/2022    Group Start Time: 1000  Group End Time: 1055  Group Topic: Psychoeducation    [facility]    Marcia Seals [place]                                                                              Group Therapy Note    Date: 4/23/2022    Type of Group: Psychoeducation    Wellness Binder Information  Module Name:  dim of wellness   Patient's Goal: patient will be able to id what is going well and what one can improve     Notes:  Pleasant and sharing in group. Observed as actively listening thru-out. Status After Intervention:  Improved    Participation Level:  Active Listener and Interactive    Participation Quality: Appropriate, Attentive, and Sharing      Speech: normal      Thought Process/Content: Logical      Affective Functioning: Congruent      Mood: euthymic      Level of consciousness:  Alert, Oriented x4, and Attentive      Response to Learning: Able to verbalize/acknowledge new learning, Able to retain information, and Progressing to goal      Endings: None Reported    Modes of Intervention: Education, Support, Socialization, Exploration, and Problem-solving      Discipline Responsible: Psychoeducational Specialist      Signature:  Neri Marina

## 2022-04-24 PROCEDURE — 6360000002 HC RX W HCPCS: Performed by: NURSE PRACTITIONER

## 2022-04-24 PROCEDURE — 6370000000 HC RX 637 (ALT 250 FOR IP): Performed by: PSYCHIATRY & NEUROLOGY

## 2022-04-24 PROCEDURE — 99231 SBSQ HOSP IP/OBS SF/LOW 25: CPT | Performed by: NURSE PRACTITIONER

## 2022-04-24 PROCEDURE — 6370000000 HC RX 637 (ALT 250 FOR IP): Performed by: NURSE PRACTITIONER

## 2022-04-24 PROCEDURE — 1240000000 HC EMOTIONAL WELLNESS R&B

## 2022-04-24 RX ADMIN — BUPRENORPHINE AND NALOXONE 1 TABLET: 8; 2 TABLET SUBLINGUAL at 09:04

## 2022-04-24 RX ADMIN — OXCARBAZEPINE 300 MG: 300 TABLET, FILM COATED ORAL at 09:04

## 2022-04-24 RX ADMIN — HYDROXYZINE PAMOATE 50 MG: 50 CAPSULE ORAL at 21:24

## 2022-04-24 RX ADMIN — Medication 3 MG: at 21:24

## 2022-04-24 NOTE — PLAN OF CARE
Problem: Altered Mood, Psychotic Behavior:  Goal: Able to verbalize decrease in frequency and intensity of hallucinations  Description: Able to verbalize decrease in frequency and intensity of hallucinations  Outcome: Progressing  Goal: Compliance with prescribed medication regimen will improve  Description: Compliance with prescribed medication regimen will improve  Outcome: Progressing

## 2022-04-24 NOTE — PROGRESS NOTES
Pt alert but tired. Out on the unit coloring. Pt had been medicated prior to change of shift. Pt states she was upset as she wanted to go home. Pt denies SI, HI, and AVH. Pt is polite and cooperative. Currently on phone with friend. Ate snack. Med compliant.  Will continue to monitor

## 2022-04-24 NOTE — GROUP NOTE
Group Therapy Note    Date: 4/24/2022    Group Start Time: 1105  Group End Time: 5696  Group Topic: Cognitive Skills    [facility]    SUSAN Cadet        Group Therapy Note    Attendees: 15         Patient's Goal:  Pt will learn about different cognitive distortions and how to challenge them. Notes:  Pt was an active participant in group discussion. Status After Intervention:  Improved    Participation Level: Active Listener and Interactive    Participation Quality: Appropriate, Attentive, Sharing and Supportive      Speech:  normal      Thought Process/Content: Logical  Linear      Affective Functioning: Blunted      Mood: anxious and depressed      Level of consciousness:  Alert, Oriented x4 and Attentive      Response to Learning: Able to verbalize current knowledge/experience, Able to verbalize/acknowledge new learning, Able to retain information, Capable of insight and Progressing to goal      Endings: None Reported    Modes of Intervention: Education, Support, Socialization, Exploration, Clarifying and Problem-solving      Discipline Responsible: /Counselor      Signature:   Daisy Cadet

## 2022-04-24 NOTE — BH NOTE
Pt denies si/hi and hallucinations. Pt is discharge focused. Pt makes good eye contact. Pt states her sleep and appetite is good. Pt out on the unit and is social with peers. Pt takes med's and attends groups.

## 2022-04-24 NOTE — GROUP NOTE
Group Therapy Note    Date: 4/24/2022    Group Start Time: 1000  Group End Time: 3871  Group Topic: Psychoeducation    [facility]    [place], [place]                                                                        Group Therapy Note    Date: 4/24/2022    Type of Group: Psychoeducation    Wellness Binder Information  Module Name: safety plan   Patient's Goal:  patient will be able to id warning signs, triggers and positive coping to help manager when in a crisis. Notes:  pleasant and sharing willing to participate and complete worksheet. Status After Intervention:  Improved    Participation Level:  Active Listener and Interactive    Participation Quality: Appropriate, Attentive, Sharing, and Supportive      Speech: normal       Thought Process/Content: Logical      Affective Functioning: Congruent      Mood: euthymic      Level of consciousness:  Alert, Oriented x4, and Attentive      Response to Learning: Able to verbalize/acknowledge new learning, Able to retain information, and Progressing to goal      Endings: None Reported    Modes of Intervention: Education, Support, Socialization, Exploration, and Problem-solving      Discipline Responsible: Psychoeducational Specialist      Signature:  Andrea Car     Group Therapy Note

## 2022-04-24 NOTE — PROGRESS NOTES
BEHAVIORAL HEALTH FOLLOW-UP NOTE     4/24/2022     Patient was seen and examined in person, Chart reviewed   Patient's case discussed with staff/team    Chief Complaint: \"I am going to go with my step mom\"    Interim History:   Patient required stat IM injections yesterday after becoming very agitated and required police presence to de-escalate. She was accusing staff of lying to her she was paranoid, and misinterpreting. Blunted affect. She was irritable on assessment today, suspicious, outwardly paranoid. She was somewhat confrontational regarding her discharge. She is very discharge focused. Unable to rationalize the connection between her behaviors and then requiring stat medications yesterday, very concrete thought process, poor insight, demonstrating poor impulse control and lack of judgment.        Appetite:   [x] Normal/Unchanged  [x] Increased  [] Decreased      Sleep:       [x] Normal/Unchanged  [] Fair       [] Poor              Energy:    [x] Normal/Unchanged  [] Increased  [] Decreased        SI [] Present  [x] Absent    HI  []Present  [x] Absent     Aggression:  [] yes  [x] no    Patient is [x] able  [] unable to CONTRACT FOR SAFETY     PAST MEDICAL/PSYCHIATRIC HISTORY:   Past Medical History:   Diagnosis Date    Concussion with loss of consciousness of 30 minutes or less 6/30/2018    Herpes genitalia     Paranoid [facility] ([facility]     Seizures ([facility]     Ulcer        FAMILY/SOCIAL HISTORY:  Family History   Problem Relation Age of Onset    Diabetes Mother     Diabetes Father     Other Brother     Hypertension Maternal Grandmother      Social History     Socioeconomic History    Marital status: Single     Spouse name: Not on file    Number of children: 1    Years of education: 8    Highest education level: 10th grade   Occupational History    Not on file   Tobacco Use    Smoking status: Current Some Day Smoker     Types: Cigarettes    Smokeless tobacco: Never Used    Tobacco comment: Started smoking again after mom passed away  2021   Vaping Use    Vaping Use: Never used   Substance and Sexual Activity    Alcohol use: Yes     Comment: occasionally-holidays    Drug use: No    Sexual activity: Yes     Partners: Male   Other Topics Concern    Not on file   Social History Narrative    2/24/2021-Stress-pt reports that she is very much stressed-pt recently lost her mother and she has been having a hard time dealing with her loss. Pt is currently receiving grief counseling through [place] [address] Determinants of Health     Financial Resource Strain:     Difficulty of Paying Living Expenses: Not on file   Food Insecurity:     Worried About Running Out of Food in the Last Year: Not on file    Suzanne of Food in the Last Year: Not on file   Transportation Needs:     Lack of Transportation (Medical): Not on file    Lack of Transportation (Non-Medical): Not on file   Physical Activity:     Days of Exercise per Week: Not on file    Minutes of Exercise per Session: Not on file   Stress:     Feeling of Stress : Not on file   Social Connections:     Frequency of Communication with Friends and Family: Not on file    Frequency of Social Gatherings with Friends and Family: Not on file    Attends Latter day Services: Not on file    Active Member of [address] [place] [address] [employer] or Organizations: Not on file    Attends Club or Organization Meetings: Not on file    Marital Status: Not on file   Intimate Partner Violence:     Fear of Current or Ex-Partner: Not on file    Emotionally Abused: Not on file    Physically Abused: Not on file    Sexually Abused: Not on file   Housing Stability:     Unable to Pay for Housing in the Last Year: Not on file    Number of [place] in the Last Year: Not on file    Unstable Housing in the Last Year: Not on file           ROS:  [x] All negative/unchanged except if checked.  Explain positive(checked items) below:  [] Constitutional  [] Eyes  [] Ear/Nose/Mouth/Throat  [] Respiratory  [] CV  [] GI  []   [] Musculoskeletal  [] Skin/Breast  [] Neurological  [] Endocrine  [] Heme/Lymph  [] Allergic/Immunologic    Explanation:     MEDICATIONS:    Current Facility-Administered Medications:     diphenhydrAMINE (BENADRYL) injection 50 mg, 50 mg, IntraMUSCular, Q6H PRN, Nevin General, APRN - CNP, 50 mg at 04/23/22 1820    LORazepam (ATIVAN) injection 1 mg, 1 mg, IntraMUSCular, Q6H PRN, Nevin General, APRN - CNP, 1 mg at 04/23/22 1820    risperiDONE ER (PERSERIS) injection 120 mg, 120 mg, SubCUTAneous, Q30 Days, Roman Richards APRN - CNP, 489 mg at 04/22/22 1223    acetaminophen (TYLENOL) tablet 650 mg, 650 mg, Oral, Q6H PRN, Valorie Valiente MD    magnesium hydroxide (MILK OF MAGNESIA) 400 MG/5ML suspension 30 mL, 30 mL, Oral, Daily PRN, Valorie Valiente MD    nicotine (NICODERM CQ) 21 MG/24HR 1 patch, 1 patch, TransDERmal, Daily, Valorie Valiente MD, 1 patch at 04/24/22 0904    aluminum & magnesium hydroxide-simethicone (MAALOX) 200-200-20 MG/5ML suspension 30 mL, 30 mL, Oral, PRN, Valorie Valiente MD    haloperidol (HALDOL) tablet 5 mg, 5 mg, Oral, Q6H PRN **OR** haloperidol lactate (HALDOL) injection 5 mg, 5 mg, IntraMUSCular, Q6H PRN, Valorie Valiente MD, 5 mg at 04/23/22 1820    melatonin tablet 3 mg, 3 mg, Oral, Nightly, Valorie Valiente MD, 3 mg at 04/23/22 2103    hydrOXYzine (VISTARIL) capsule 50 mg, 50 mg, Oral, TID PRN, Valorie Valiente MD, 50 mg at 04/22/22 2123    OXcarbazepine (TRILEPTAL) tablet 300 mg, 300 mg, Oral, Daily, Vito Ren, APRN - CNP, 290 mg at 04/24/22 0904    melatonin tablet 3 mg, 3 mg, Oral, Nightly PRN, Dayanna Tobar APRN - CNP    buprenorphine-naloxone (SUBOXONE) 8-2 MG SL tablet 1 tablet, 1 tablet, SubLINGual, Daily, AKHIL Desir - CNP, 1 tablet at 04/24/22 0904    chlordiazePOXIDE (LIBRIUM) capsule 25 mg, 25 mg, Oral, W9Z PRN, Roman Richards APRN - CNP      Examination:  BP (!) 151/72 Pulse 91   Temp 98.2 °F (36.8 °C)   Resp 14   Ht 5' 5\" (1.651 m)   Wt 230 lb (104.3 kg)   SpO2 100%   BMI 38.27 kg/m²   Gait - steady  Medication side effects(SE): denies    Mental Status Examination:    Level of consciousness:  within normal limits   Appearance:  fair grooming and fair hygiene  Behavior/Motor:  no abnormalities noted  Attitude toward examiner:  cooperative  Speech:  spontaneous, normal rate and normal volume   Mood: \" I am okay. \"  Affect: Euthymic  Thought processes: Slow  Thought content: Denies auditory or visual hallucinations does not appear overtly or covertly psychotic paranoid or manic. SI/HI intent or plan  Cognition:  oriented to person, place, and time   Concentration intact  Insight and judgment are improving    ASSESSMENT:   Patient symptoms are:  [] Well controlled  [x] Improving  [] Worsening  [] No change      Diagnosis:  Principal Problem:    Paranoid schizophrenia ([facility]  Active Problems:    Major depressive episode    Alcohol abuse  Resolved Problems:    * No resolved hospital problems. *      LABS:    No results for input(s): WBC, HGB, PLT in the last 72 hours. No results for input(s): NA, K, CL, CO2, BUN, CREATININE, GLUCOSE in the last 72 hours. No results for input(s): BILITOT, ALKPHOS, AST, ALT in the last 72 hours. Lab Results   Component Value Date    LABAMPH NOT DETECTED 04/18/2022    [address] NOT DETECTED 07/30/2011    BARBSCNU NOT DETECTED 04/18/2022    LABBENZ NOT DETECTED 04/18/2022    LABBENZ SEE BELOW 08/29/2014    CANNAB POSITIVE  07/30/2011    LABMETH NOT DETECTED 04/18/2022    OPIATESCREENURINE NOT DETECTED 04/18/2022    PHENCYCLIDINESCREENURINE NOT DETECTED 04/18/2022    ETOH 17 04/18/2022     Lab Results   Component Value Date    TSH 0.899 07/14/2020     No results found for: LITHIUM  No results found for: VALPROATE, CBMZ        Treatment Plan:  Reviewed current Medications with the patient.    Risks, benefits, side effects, drug-to-drug interactions and alternatives to treatment were discussed. Collateral information:   CD evaluation  Encourage patient to attend group and other milieu activities. Discharge planning discussed with the patient and treatment team.    Continue Suboxone 8/2 daily  Continue Trileptal 300 mg twice daily  Perseris 120 mg IM every 30 days first dose on 4/22/2022 patient tolerated well    PSYCHOTHERAPY/COUNSELING:  [x] Therapeutic interview  [x] Supportive  [] CBT  [] Ongoing  [] Other    [x] Patient continues to need, on a daily basis, active treatment furnished directly by or requiring the supervision of inpatient psychiatric personnel      Anticipated Length of stay: 3 to 7 days based on stability    NOTE: This report was transcribed using voice recognition software.  Every effort was made to ensure accuracy; however, inadvertent computerized transcription errors may be present.       Electronically signed by AKHIL Avitia CNP on 4/24/2022 at 9:27 AM

## 2022-04-25 PROCEDURE — 6360000002 HC RX W HCPCS: Performed by: NURSE PRACTITIONER

## 2022-04-25 PROCEDURE — 99232 SBSQ HOSP IP/OBS MODERATE 35: CPT | Performed by: NURSE PRACTITIONER

## 2022-04-25 PROCEDURE — 1240000000 HC EMOTIONAL WELLNESS R&B

## 2022-04-25 PROCEDURE — 6370000000 HC RX 637 (ALT 250 FOR IP): Performed by: PSYCHIATRY & NEUROLOGY

## 2022-04-25 PROCEDURE — 6370000000 HC RX 637 (ALT 250 FOR IP): Performed by: NURSE PRACTITIONER

## 2022-04-25 RX ADMIN — BUPRENORPHINE AND NALOXONE 1 TABLET: 8; 2 TABLET SUBLINGUAL at 09:08

## 2022-04-25 RX ADMIN — OXCARBAZEPINE 300 MG: 300 TABLET, FILM COATED ORAL at 09:08

## 2022-04-25 RX ADMIN — Medication 3 MG: at 20:19

## 2022-04-25 RX ADMIN — HYDROXYZINE PAMOATE 50 MG: 50 CAPSULE ORAL at 20:20

## 2022-04-25 ASSESSMENT — PAIN SCALES - GENERAL: PAINLEVEL_OUTOF10: 0

## 2022-04-25 NOTE — PROGRESS NOTES
Pt alert, calm, and cooperative. Out on the unit and social. Pt coloring. Denies SI, HI, oliver AVH@ this time. No issues with her meds and no outbursts.  Will continue to monitor

## 2022-04-25 NOTE — GROUP NOTE
Group Therapy Note    Date: 4/25/2022    Group Start Time: 1100  Group End Time: 1150  Group Topic: Psychotherapy    [facility]    YNES Coley LSW        Group Therapy Note    Attendees: 7         Patient's Goal: To increase social interaction and improve relationships with others. Notes: Pt was attentive in group and was able to identify an agenda. She was also able to verbalize relating to others within the group. Status After Intervention:  Improved    Participation Level:  Active Listener and Interactive    Participation Quality: Appropriate, Attentive, Sharing and Supportive      Speech:  normal      Thought Process/Content: Logical      Affective Functioning: Congruent      Mood: anxious      Level of consciousness:  Alert and Oriented x4      Response to Learning: Able to verbalize current knowledge/experience      Endings: None Reported    Modes of Intervention: Support, Socialization and Exploration      Discipline Responsible: /Counselor      Signature:  YNES Lemos, SUSAN

## 2022-04-25 NOTE — GROUP NOTE
Date: 4/25/2022    Group Start Time: 0930  Group End Time: 0762  Group Topic: Psychoeducation    [facility]    [place], [place]                                                                            Group Therapy Note    Date: 4/25/2022    Type of Group: Psychoeducation    Wellness Binder Information  Module Name: coping skills for success    Patient's Goal:  patient will be able to id what daily skills one can do to decrease stress levels. Notes: pleasant and sharing in group, able to identify what one does to keep stress low. Status After Intervention:  Improved    Participation Level:  Active Listener and Interactive    Participation Quality: Appropriate, Attentive, Sharing, and Supportive      Speech:  normal       Thought Process/Content: Logical      Affective Functioning: Congruent      Mood: euthymic      Level of consciousness:  Alert, Oriented x4, and Attentive      Response to Learning: Able to verbalize/acknowledge new learning, Able to retain information, and Progressing to goal      Endings: None Reported    Modes of Intervention: Education, Support, Socialization, Exploration, and Problem-solving      Discipline Responsible: Psychoeducational Specialist      Signature:  Randee Sheikh

## 2022-04-25 NOTE — PROGRESS NOTES
BEHAVIORAL HEALTH FOLLOW-UP NOTE     4/25/2022     Patient was seen and examined in person, Chart reviewed   Patient's case discussed with staff/team    Chief Complaint: Discharge focused    Interim History:   Patient seen during treatment team she continues to have limited insight and judgment regarding circumstance hospitalization need for treatment. Patient required IM medication Saturday yelling at the nurses demanding to be discharged. I explained to patient that she would need to have good behaviors today and we can look forward to a discharge possibly tomorrow. Patient was somewhat argumentative with this decision although she stated control of her behaviors.   She denies SI/HI intent or plan she denies auditory or visual hallucinations there are no overt overt signs of psychosis at this time she believes she has not been discharged due to her medical insurance she is limited insight regarding her behaviors on the unit    Appetite:   [x] Normal/Unchanged  [x] Increased  [] Decreased      Sleep:       [x] Normal/Unchanged  [] Fair       [] Poor              Energy:    [x] Normal/Unchanged  [] Increased  [] Decreased        SI [] Present  [x] Absent    HI  []Present  [x] Absent     Aggression:  [] yes  [x] no    Patient is [x] able  [] unable to CONTRACT FOR SAFETY     PAST MEDICAL/PSYCHIATRIC HISTORY:   Past Medical History:   Diagnosis Date    Concussion with loss of consciousness of 30 minutes or less 6/30/2018    Herpes genitalia     [facility]     Seizures ([facility]     Ulcer        FAMILY/SOCIAL HISTORY:  Family History   Problem Relation Age of Onset    Diabetes Mother     Diabetes Father     Other Brother     Hypertension Maternal Grandmother      Social History     Socioeconomic History    Marital status: Single     Spouse name: Not on file    Number of children: 1    Years of education: 10    Highest education level: 10th grade   Occupational History    Not on file Tobacco Use    Smoking status: Current Some Day Smoker     Types: Cigarettes    Smokeless tobacco: Never Used    Tobacco comment: Started smoking again after mom passed away  2021   Vaping Use    Vaping Use: Never used   Substance and Sexual Activity    Alcohol use: Yes     Comment: occasionally-holidays    Drug use: No    Sexual activity: Yes     Partners: Male   Other Topics Concern    Not on file   Social History Narrative    2/24/2021-Stress-pt reports that she is very much stressed-pt recently lost her mother and she has been having a hard time dealing with her loss. Pt is currently receiving grief counseling through [place] [id number] Determinants of Health     Financial Resource Strain:     Difficulty of Paying Living Expenses: Not on file   Food Insecurity:     Worried About Running Out of Food in the Last Year: Not on file    Suzanne of Food in the Last Year: Not on file   Transportation Needs:     Lack of Transportation (Medical): Not on file    Lack of Transportation (Non-Medical):  Not on file   Physical Activity:     Days of Exercise per Week: Not on file    Minutes of Exercise per Session: Not on file   Stress:     Feeling of Stress : Not on file   Social Connections:     Frequency of Communication with Friends and Family: Not on file    Frequency of Social Gatherings with Friends and Family: Not on file    Attends Gnosticist Services: Not on file    Active Member of [address] [place] [address] [employer] or Organizations: Not on file    Attends Club or Organization Meetings: Not on file    Marital Status: Not on file   Intimate Partner Violence:     Fear of Current or Ex-Partner: Not on file    Emotionally Abused: Not on file    Physically Abused: Not on file    Sexually Abused: Not on file   Housing Stability:     Unable to Pay for Housing in the Last Year: Not on file    Number of [place] in the Last Year: Not on file    Unstable Housing in the Last Year: Not on file           ROS:  [x] All negative/unchanged except if checked.  Explain positive(checked items) below:  [] Constitutional  [] Eyes  [] Ear/Nose/Mouth/Throat  [] Respiratory  [] CV  [] GI  []   [] Musculoskeletal  [] Skin/Breast  [] Neurological  [] Endocrine  [] Heme/Lymph  [] Allergic/Immunologic    Explanation:     MEDICATIONS:    Current Facility-Administered Medications:     diphenhydrAMINE (BENADRYL) injection 50 mg, 50 mg, IntraMUSCular, Q6H PRN, Dwain AKHIL Monahan - CNP, 50 mg at 04/23/22 1820    LORazepam (ATIVAN) injection 1 mg, 1 mg, IntraMUSCular, Q6H PRN, Dwain Taniya APRN - CNP, 1 mg at 04/23/22 1820    risperiDONE ER (PERSERIS) injection 120 mg, 120 mg, SubCUTAneous, Q30 Days, Valerie Lee, APRN - CNP, 579 mg at 04/22/22 1223    acetaminophen (TYLENOL) tablet 650 mg, 650 mg, Oral, Q6H PRN, Brennon Carlton MD    magnesium hydroxide (MILK OF MAGNESIA) 400 MG/5ML suspension 30 mL, 30 mL, Oral, Daily PRN, Brennon Carlton MD    nicotine (NICODERM CQ) 21 MG/24HR 1 patch, 1 patch, TransDERmal, Daily, Brennon Carlton MD, 1 patch at 04/24/22 0904    aluminum & magnesium hydroxide-simethicone (MAALOX) 200-200-20 MG/5ML suspension 30 mL, 30 mL, Oral, PRN, Brennon Carlton MD    haloperidol (HALDOL) tablet 5 mg, 5 mg, Oral, Q6H PRN **OR** haloperidol lactate (HALDOL) injection 5 mg, 5 mg, IntraMUSCular, Q6H PRN, Brennon Carlton MD, 5 mg at 04/23/22 1820    melatonin tablet 3 mg, 3 mg, Oral, Nightly, Brennon Carlton MD, 3 mg at 04/24/22 2124    hydrOXYzine (VISTARIL) capsule 50 mg, 50 mg, Oral, TID PRN, Brennon Carlton MD, 50 mg at 04/24/22 2124    OXcarbazepine (TRILEPTAL) tablet 300 mg, 300 mg, Oral, Daily, Vadim Ren, APRN - CNP, 368 mg at 04/24/22 0904    melatonin tablet 3 mg, 3 mg, Oral, Nightly PRN, AKHIL Desir CNP    buprenorphine-naloxone (SUBOXONE) 8-2 MG SL tablet 1 tablet, 1 tablet, SubLINGual, Daily, AKHIL Desir CNP, 1 tablet at 04/24/22 0904    chlordiazePOXIDE (LIBRIUM) capsule 25 mg, 25 mg, Oral, I8X PRN, Severiano Plater Mikal, APRN - CNP      Examination:  /68   Pulse 74   Temp 97.6 °F (36.4 °C)   Resp 14   Ht 5' 5\" (1.651 m)   Wt 230 lb (104.3 kg)   SpO2 100%   BMI 38.27 kg/m²   Gait - steady  Medication side effects(SE): denies    Mental Status Examination:    Level of consciousness:  within normal limits   Appearance:  fair grooming and fair hygiene  Behavior/Motor:  no abnormalities noted  Attitude toward examiner:  cooperative  Speech:  spontaneous, normal rate and normal volume   Mood: \" I am okay. \"  Affect: Euthymic  Thought processes: Slow  Thought content: Denies auditory or visual hallucinations , delusions or other perceptual abnormalities. SI/HI intent or plan  Cognition:  oriented to person, place, and time   Concentration intact  Insight and judgment are improving    ASSESSMENT:   Patient symptoms are:  [] Well controlled  [x] Improving  [] Worsening  [] No change      Diagnosis:  Principal Problem:    [facility]  Active Problems:    Major depressive episode    Alcohol abuse  Resolved Problems:    * No resolved hospital problems. *      LABS:    No results for input(s): WBC, HGB, PLT in the last 72 hours. No results for input(s): NA, K, CL, CO2, BUN, CREATININE, GLUCOSE in the last 72 hours. No results for input(s): BILITOT, ALKPHOS, AST, ALT in the last 72 hours.   Lab Results   Component Value Date    LABAMPH NOT DETECTED 04/18/2022    [address] NOT DETECTED 07/30/2011    BARBSCNU NOT DETECTED 04/18/2022    LABBENZ NOT DETECTED 04/18/2022    LABBENZ SEE BELOW 08/29/2014    CANNAB POSITIVE  07/30/2011    LABMETH NOT DETECTED 04/18/2022    OPIATESCREENURINE NOT DETECTED 04/18/2022    PHENCYCLIDINESCREENURINE NOT DETECTED 04/18/2022    ETOH 17 04/18/2022     Lab Results   Component Value Date    TSH 0.899 07/14/2020     No results found for: LITHIUM  No results found for: VALPROATE, CBMZ        Treatment Plan:  Reviewed current Medications with the patient. Risks, benefits, side effects, drug-to-drug interactions and alternatives to treatment were discussed. Collateral information:   CD evaluation  Encourage patient to attend group and other milieu activities. Discharge planning discussed with the patient and treatment team.    Continue Suboxone 8/2 daily  Increase Trileptal 450 mg twice daily  Perseris 120 mg IM every 30 days first dose on 4/22/2022 patient tolerated well    PSYCHOTHERAPY/COUNSELING:  [x] Therapeutic interview  [x] Supportive  [] CBT  [] Ongoing  [] Other    [x] Patient continues to need, on a daily basis, active treatment furnished directly by or requiring the supervision of inpatient psychiatric personnel      Anticipated Length of stay: 3 to 7 days based on stability    NOTE: This report was transcribed using voice recognition software.  Every effort was made to ensure accuracy; however, inadvertent computerized transcription errors may be present.       Electronically signed by Gleda Kanner, APRN - CNP on 5/63/5350 at 8:43 AM

## 2022-04-25 NOTE — PLAN OF CARE
[id number] [facility] Interdisciplinary Treatment Plan Note     Review Date & Time: 4/25/2022    10:30 AM      Patient was in treatment team.    Estimated Length of Stay Update:   10 days   Estimated Discharge Date Update:  4/26/22    EDUCATION:   Learner Progress Toward Treatment Goals: Reviewed results and recommendations of this team    Method: Group    Outcome: Verbalized understanding    PATIENT GOALS:  \"be positive, be compliant\"    PLAN/TREATMENT RECOMMENDATIONS UPDATE:  Encourage daily goals and groups    GOALS UPDATE:  Time frame for Short-Term Goals:  By discharge      Chad Erickson RN

## 2022-04-25 NOTE — CARE COORDINATION
Pt was seen during treatment team. Pt reports she is doing better today. Pt denied SI/HI/AVH. Pt stated she became angry and loud on Saturday because she thought she was going to be discharged and she was not. Pt was medicated on Saturday due to an increase in her gross motor activity, yelling at the nurses demanding to be discharged. Pt agitated and irritable during treatment team. As pt was walking out of the room she stated that she isn't being discharged because she has medical and if she didn't have medical she would get discharged. Sadie contacted "[employer] and cancelled pt follow up appointment for today. Sw will reschedule this appointment on day of discharge.

## 2022-04-25 NOTE — CARE COORDINATION
Sadie spoke with pt step mother [facility]. [facility] asked when pt will be discharging. Sadie advised her that pt tentative discharge date is tomorrow. Ranmaggie expressed no concerns for discharge and stated she is sounding better. No further questions, offered to assist as needed.

## 2022-04-25 NOTE — PLAN OF CARE
Problem: Altered Mood, Psychotic Behavior:  Goal: Able to verbalize reality based thinking  Description: Able to verbalize reality based thinking  Outcome: Progressing  Goal: Absence of self-harm  Description: Absence of self-harm  Outcome: Progressing         Patient became irritable during treatment team related to her discharge. Guarded with conversations. Patient does attend groups and is medication compliant. Denies suicidal and homicidal thoughts. Denies hallucinations.

## 2022-04-25 NOTE — PROGRESS NOTES
Attended morning community meeting. Updated on staffing and daily expectations. Shared goal for the day as to be positive and be compliant.

## 2022-04-26 VITALS
SYSTOLIC BLOOD PRESSURE: 102 MMHG | WEIGHT: 230 LBS | RESPIRATION RATE: 16 BRPM | TEMPERATURE: 98 F | BODY MASS INDEX: 38.32 KG/M2 | DIASTOLIC BLOOD PRESSURE: 55 MMHG | HEART RATE: 67 BPM | HEIGHT: 65 IN | OXYGEN SATURATION: 100 %

## 2022-04-26 PROCEDURE — 99239 HOSP IP/OBS DSCHRG MGMT >30: CPT | Performed by: NURSE PRACTITIONER

## 2022-04-26 PROCEDURE — 6370000000 HC RX 637 (ALT 250 FOR IP): Performed by: PSYCHIATRY & NEUROLOGY

## 2022-04-26 PROCEDURE — 6360000002 HC RX W HCPCS: Performed by: NURSE PRACTITIONER

## 2022-04-26 PROCEDURE — 6370000000 HC RX 637 (ALT 250 FOR IP): Performed by: NURSE PRACTITIONER

## 2022-04-26 RX ORDER — OXCARBAZEPINE 150 MG/1
450 TABLET, FILM COATED ORAL DAILY
Qty: 90 TABLET | Refills: 0 | Status: SHIPPED | OUTPATIENT
Start: 2022-04-26 | End: 2022-05-26

## 2022-04-26 RX ADMIN — OXCARBAZEPINE 450 MG: 300 TABLET, FILM COATED ORAL at 09:10

## 2022-04-26 RX ADMIN — BUPRENORPHINE AND NALOXONE 1 TABLET: 8; 2 TABLET SUBLINGUAL at 09:09

## 2022-04-26 ASSESSMENT — PAIN SCALES - GENERAL: PAINLEVEL_OUTOF10: 0

## 2022-04-26 NOTE — BH NOTE
Pt discharged with followings belongings:   Dental Appliances: None  Vision - Corrective Lenses: None  Hearing Aid: None  Jewelry: None  Body Piercings Removed: N/A  Clothing: Undergarments (Comment),Socks,Shirt,Pants,Jacket / coat,Footwear  Other Valuables: Purse,Wallet,Cell phone (charrger, lighter, lotion. In bag: multiple bank cards, ins cards, appointment cards)   Valuables sent home with patient including all home medications. . Valuables retrieved from safe, Security envelope number:  none and returned to patient. Patient left department with   via  , discharged to  . Patient education on aftercare instructions: yes  Patient verbalize understanding of AVS:   yes. Given suicide prevention handout . Discharged in stable condition. Status EXAM upon discharge:  Mental Status and Behavioral Exam  Normal: Yes  Level of Assistance: Independent/Self  Facial Expression: Brightened  Affect: Appropriate,Congruent  Level of Consciousness: Alert  Frequency of Checks: 4 times per hour, close  Mood:Normal: Yes  Mood: Anxious,Depressed  Motor Activity:Normal: Yes  Motor Activity: Decreased  Eye Contact: Good  Observed Behavior: Cooperative,Friendly  Sexual Misconduct History: Current - no  Involved In Any Sexual Misconduct With Others? : No  History of Sexually Inappropriate Behavior When Previously Hospitalized?: No  Uncontrollable/Compulsive Masturbation?: No  Difficulty Controlling Sexual Impulses?: No  Preception: [place] to person,[place] to time,[place] to place,[place] to situation  Attention:Normal: Yes  Attention: Distractible  Thought Processes: Perseveration  Thought Content:Normal: Yes  Thought Content: Poverty of content  Depression Symptoms: No problems reported or observed. Anxiety Symptoms: No problems reported or observed. Astrid Symptoms: No problems reported or observed.   Hallucination Type:  (Denies)  Hallucinations: None  Delusions: No  Delusions: Paranoid  Memory:Normal: No  Memory: Poor recent  Insight and Judgment: Yes  Insight and Judgment: Poor judgment,Poor insight    Amy Lopez RN

## 2022-04-26 NOTE — CARE COORDINATION
Sadie met with pt to discuss her discharge. Pt reports feeling alright today, denied SI/HI/AVH. Pt feels ready to discharge home to her step mom, stated her step mom wants her to stay with her temporarily before returning to her own apartment. Pt is aware of her follow up appointments with [facility] and [facility]. Collateral was done with pt step mom who expressed no concerns for discharge and confirmed she does not have access to any guns or other weapons. Pt has strong family support, outpatient provider, spiritual beliefs, safe/stable housing, access to essential needs, medication compliant. Per RN C-SSRS pt is no risk. Pt is bright, pleasant, cooperative, with good eye contact, clear speech, and improved insight/judgement. Pt stated she is no longer experiencing auditory hallucinations and no longer feels like a demon is inside of her. Sadie contacted pt step mom [facility] to discuss pt discharge. Aminta expressed no concerns for pt returning home today and she will be able to pick pt up.     In order to ensure appropriate transition and discharge planning is in place, the following documents have been transmitted to [facility] and [facility], as the new outpatient provider:     The d/c diagnosis was transmitted to the next care provider   The reason for hospitalization was transmitted to the next care provider   The d/c medications (dosage and indication) were transmitted to the next care provider    The continuing care plan was transmitted to the next care provider

## 2022-04-26 NOTE — GROUP NOTE
Group Therapy Note    Date: 4/26/2022    Group Start Time: 0930  Group End Time: 1030  Group Topic: Psychoeducation    [facility]    [facility]        Group Therapy Note      Number of participants: 14  Type of group: Psychoeducation  Mode of intervention: Education, Support, Socialization, Exploration, Clarifying, and Problem-solving  Topic: 7 Things in Our Control   Objective: Pt will identify 3 things in their control during current treatment stay. Patient's Goal:  \"Stay positive\"     Notes:  Pt interactive during group sharing 3 things in her control during current treatment stay. Pt gave support and feedback to others. Status After Intervention:  Improved    Participation Level:  Active Listener and Interactive    Participation Quality: Appropriate, Attentive, Sharing and Supportive      Speech:  normal      Thought Process/Content: Logical      Affective Functioning: Congruent      Mood: euthymic      Level of consciousness:  Alert, Oriented x4 and Attentive      Response to Learning: Able to verbalize current knowledge/experience, Able to verbalize/acknowledge new learning, Able to retain information, Capable of insight, Able to change behavior and Progressing to goal      Endings: None Reported    Modes of Intervention: Education, Support, Socialization, Exploration, Clarifying and Problem-solving

## 2022-04-26 NOTE — GROUP NOTE
Group Therapy Note    Date: 4/26/2022    Group Start Time: 1400  Group End Time: 1430  Group Topic: Recovery    [facility]    Ellie [place], [facility]        Group Therapy Note    Number of participants: 7  Type of group: Recovery  Mode of intervention: Education and Support  Topic: Peer Recovery  Objective: Pt will gain knowledge about community resources. Notes:  Pt attended peer recovery group and shared when appropriate. Pt gave support and feedback to others. Status After Intervention:  Improved    Participation Level:  Active Listener and Interactive    Participation Quality: Appropriate, Attentive, Sharing and Supportive      Speech:  normal      Thought Process/Content: Logical      Affective Functioning: Congruent      Mood: euthymic      Level of consciousness:  Alert, Oriented x4 and Attentive      Response to Learning: Able to verbalize current knowledge/experience, Able to verbalize/acknowledge new learning, Able to retain information, Capable of insight, Able to change behavior and Progressing to goal      Endings: None Reported    Modes of Intervention: Education and Support
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
## 2022-04-26 NOTE — CARE COORDINATION
SW has appointments scheduled with [facility] on 4/29 at 10:30AM in the office for a diagnostic assessment with Anna Coffman and a psychiatric evaluation 5/11 at 12:30 PM in the office with [facility].      [facility]  Location: [place] [address], [address] [place] [address]  Phone number: [phone]

## 2022-04-26 NOTE — GROUP NOTE
Group Therapy Note    Date: 4/26/2022    Group Start Time: 1100  Group End Time: 1150  Group Topic: Psychotherapy    [facility]    YNES Coley LSW        Group Therapy Note    Attendees: 4         Patient's Goal:  To increase social interaction and improve relationships with others. Notes: Pt was attentive in group and was able to identify an agenda. She was also able to verbalize relating to others within the group. Status After Intervention:  Improved    Participation Level:  Active Listener and Interactive    Participation Quality: Appropriate, Attentive, Sharing and Supportive      Speech:  normal      Thought Process/Content: Logical      Affective Functioning: Congruent      Mood: euthymic      Level of consciousness:  Alert and Oriented x4      Response to Learning: Able to verbalize current knowledge/experience      Endings: None Reported    Modes of Intervention: Support, Socialization and Exploration      Discipline Responsible: /Counselor      Signature:  YNES Duran LSW

## 2022-04-26 NOTE — PLAN OF CARE
Patient denies suicidal ideation, homicidal ideations and AVH. Patient denies anxiety and depression. Patient states she is feeling better and plans on getting Melatonin when she leaves to help her sleep. Presents calm and cooperative during assessment. Patient is out on the unit and is social with peers. Medications taken without issue. No complaints or concerns verbalized at this time. No unit problems reported. Will continue to observe and support.     Problem: Altered Mood, Psychotic Behavior:  Goal: Able to verbalize decrease in frequency and intensity of hallucinations  Description: Able to verbalize decrease in frequency and intensity of hallucinations  Outcome: Progressing     Problem: Altered Mood, Psychotic Behavior:  Goal: Able to verbalize reality based thinking  Description: Able to verbalize reality based thinking  4/25/2022 2104 by Jose Scott RN  Outcome: Progressing     Problem: Altered Mood, Psychotic Behavior:  Goal: Absence of self-harm  Description: Absence of self-harm  4/25/2022 2104 by Jose Scott RN  Outcome: Progressing

## 2022-04-26 NOTE — PROGRESS NOTES
CLINICAL PHARMACY NOTE: MEDS TO BEDS    Total # of Prescriptions Filled: 1   The following medications were delivered to the patient:  · Oxcarbazepine 150mg    Additional Documentation:    Delivered to patient 4/26/22 @10:56am

## 2022-08-09 ENCOUNTER — HOSPITAL ENCOUNTER (OUTPATIENT)
Age: 32
Discharge: HOME | End: 2022-08-09
Payer: COMMERCIAL

## 2022-08-09 LAB
HCG QUALITATIVE: NEGATIVE
PROLACTIN: 91.25 NG/ML

## 2022-08-09 PROCEDURE — 84146 ASSAY OF PROLACTIN: CPT

## 2022-08-09 PROCEDURE — 36415 COLL VENOUS BLD VENIPUNCTURE: CPT

## 2022-08-09 PROCEDURE — 84703 CHORIONIC GONADOTROPIN ASSAY: CPT

## 2023-02-19 ENCOUNTER — HOSPITAL ENCOUNTER (EMERGENCY)
Age: 33
Discharge: HOME | End: 2023-02-19

## 2023-02-19 VITALS
TEMPERATURE: 97.2 F | DIASTOLIC BLOOD PRESSURE: 87 MMHG | OXYGEN SATURATION: 98 % | RESPIRATION RATE: 16 BRPM | HEART RATE: 82 BPM | SYSTOLIC BLOOD PRESSURE: 135 MMHG

## 2023-02-19 PROCEDURE — 4500000002 HC ER NO CHARGE

## 2023-02-19 NOTE — ED NOTES
Department of Emergency Medicine  FIRST PROVIDER TRIAGE NOTE             Independent MLP           2/19/23  4:55 PM EST    Date of Encounter: 2/19/23   MRN: [id number]      HPI: Shaun Link is a 28 y.o. female who presents to the ED for Abscess (On left side of labia x1 week, painful )  Presents for complaints of an abscess to the left labia which began approximate 1 week ago. States that it did open a few days ago and has been draining purulent fluid. He denies any fever, abdominal pain. ROS: Negative for cp, sob, or abd pain. PE: Gen Appearance/Constitutional: alert     Initial Plan of Care: All treatment areas with department are currently occupied. Plan to order/Initiate the following while awaiting opening in ED: .   Initiate Treatment-Testing, Proceed toTreatment Area When Bed Available for ED Attending/MLP to Continue Care    Electronically signed by AKHIL Gerardo CNP   DD: 2/19/23       AKHIL Rodriges CNP  02/19/23 2536

## 2023-02-19 NOTE — ED NOTES
Attempted to call for pt to move her to room 32. Called three times and unable to locate pt.   Checked ronaldo Borrego RN  02/19/23 1813 Length To Time In Minutes Device Was In Place: 10

## 2023-03-12 ENCOUNTER — HOSPITAL ENCOUNTER (EMERGENCY)
Age: 33
Discharge: OTHER INSTITUTION | DRG: 750 | End: 2023-03-12
Attending: EMERGENCY MEDICINE
Payer: COMMERCIAL

## 2023-03-12 VITALS
HEART RATE: 89 BPM | HEIGHT: 64 IN | TEMPERATURE: 98.1 F | DIASTOLIC BLOOD PRESSURE: 83 MMHG | BODY MASS INDEX: 40.1 KG/M2 | SYSTOLIC BLOOD PRESSURE: 129 MMHG | OXYGEN SATURATION: 98 % | RESPIRATION RATE: 16 BRPM

## 2023-03-12 DIAGNOSIS — R44.3 HALLUCINATION: Primary | ICD-10-CM

## 2023-03-12 LAB
ACETAMINOPHEN LEVEL: <5 MCG/ML (ref 10–30)
ALBUMIN SERPL-MCNC: 4.6 G/DL (ref 3.5–5.2)
ALP BLD-CCNC: 58 U/L (ref 35–104)
ALT SERPL-CCNC: 42 U/L (ref 0–32)
AMPHETAMINE SCREEN, URINE: NOT DETECTED
ANION GAP SERPL CALCULATED.3IONS-SCNC: 13 MMOL/L (ref 7–16)
AST SERPL-CCNC: 33 U/L (ref 0–31)
BARBITURATE SCREEN URINE: NOT DETECTED
BASOPHILS ABSOLUTE: 0.02 E9/L (ref 0–0.2)
BASOPHILS RELATIVE PERCENT: 0.5 % (ref 0–2)
BENZODIAZEPINE SCREEN, URINE: NOT DETECTED
BILIRUB SERPL-MCNC: 0.5 MG/DL (ref 0–1.2)
BUN BLDV-MCNC: 18 MG/DL (ref 6–20)
CALCIUM SERPL-MCNC: 9 MG/DL (ref 8.6–10.2)
CANNABINOID SCREEN URINE: POSITIVE
CHLORIDE BLD-SCNC: 105 MMOL/L (ref 98–107)
CO2: 24 MMOL/L (ref 22–29)
COCAINE METABOLITE SCREEN URINE: NOT DETECTED
CREAT SERPL-MCNC: 0.6 MG/DL (ref 0.5–1)
EOSINOPHILS ABSOLUTE: 0.01 E9/L (ref 0.05–0.5)
EOSINOPHILS RELATIVE PERCENT: 0.2 % (ref 0–6)
ETHANOL: <10 MG/DL (ref 0–0.08)
FENTANYL SCREEN, URINE: NOT DETECTED
GFR SERPL CREATININE-BSD FRML MDRD: >60 ML/MIN/1.73
GLUCOSE BLD-MCNC: 140 MG/DL (ref 74–99)
HCG(URINE) PREGNANCY TEST: NEGATIVE
HCT VFR BLD CALC: 41.5 % (ref 34–48)
HEMOGLOBIN: 13.8 G/DL (ref 11.5–15.5)
IMMATURE GRANULOCYTES #: 0 E9/L
IMMATURE GRANULOCYTES %: 0 % (ref 0–5)
INFLUENZA A: NOT DETECTED
INFLUENZA B: NOT DETECTED
LYMPHOCYTES ABSOLUTE: 0.76 E9/L (ref 1.5–4)
LYMPHOCYTES RELATIVE PERCENT: 17.9 % (ref 20–42)
Lab: ABNORMAL
MCH RBC QN AUTO: 29.1 PG (ref 26–35)
MCHC RBC AUTO-ENTMCNC: 33.3 % (ref 32–34.5)
MCV RBC AUTO: 87.4 FL (ref 80–99.9)
METHADONE SCREEN, URINE: NOT DETECTED
MONOCYTES ABSOLUTE: 0.28 E9/L (ref 0.1–0.95)
MONOCYTES RELATIVE PERCENT: 6.6 % (ref 2–12)
NEUTROPHILS ABSOLUTE: 3.17 E9/L (ref 1.8–7.3)
NEUTROPHILS RELATIVE PERCENT: 74.8 % (ref 43–80)
OPIATE SCREEN URINE: NOT DETECTED
OXYCODONE URINE: NOT DETECTED
PDW BLD-RTO: 13.8 FL (ref 11.5–15)
PHENCYCLIDINE SCREEN URINE: NOT DETECTED
PLATELET # BLD: 254 E9/L (ref 130–450)
PMV BLD AUTO: 10.6 FL (ref 7–12)
POTASSIUM SERPL-SCNC: 3.1 MMOL/L (ref 3.5–5)
RBC # BLD: 4.75 E12/L (ref 3.5–5.5)
SALICYLATE, SERUM: <0.3 MG/DL (ref 0–30)
SARS-COV-2 RNA, RT PCR: NOT DETECTED
SODIUM BLD-SCNC: 142 MMOL/L (ref 132–146)
TOTAL PROTEIN: 7.6 G/DL (ref 6.4–8.3)
TRICYCLIC ANTIDEPRESSANTS SCREEN SERUM: NEGATIVE NG/ML
WBC # BLD: 4.2 E9/L (ref 4.5–11.5)

## 2023-03-12 PROCEDURE — 85025 COMPLETE CBC W/AUTO DIFF WBC: CPT

## 2023-03-12 PROCEDURE — 93005 ELECTROCARDIOGRAM TRACING: CPT | Performed by: EMERGENCY MEDICINE

## 2023-03-12 PROCEDURE — 96372 THER/PROPH/DIAG INJ SC/IM: CPT

## 2023-03-12 PROCEDURE — 80053 COMPREHEN METABOLIC PANEL: CPT

## 2023-03-12 PROCEDURE — 99284 EMERGENCY DEPT VISIT MOD MDM: CPT

## 2023-03-12 PROCEDURE — 6360000002 HC RX W HCPCS: Performed by: EMERGENCY MEDICINE

## 2023-03-12 PROCEDURE — 80307 DRUG TEST PRSMV CHEM ANLYZR: CPT

## 2023-03-12 PROCEDURE — 6370000000 HC RX 637 (ALT 250 FOR IP): Performed by: EMERGENCY MEDICINE

## 2023-03-12 PROCEDURE — 80143 DRUG ASSAY ACETAMINOPHEN: CPT

## 2023-03-12 PROCEDURE — 81025 URINE PREGNANCY TEST: CPT

## 2023-03-12 PROCEDURE — 82077 ASSAY SPEC XCP UR&BREATH IA: CPT

## 2023-03-12 PROCEDURE — 80179 DRUG ASSAY SALICYLATE: CPT

## 2023-03-12 PROCEDURE — 87636 SARSCOV2 & INF A&B AMP PRB: CPT

## 2023-03-12 RX ORDER — HALOPERIDOL 5 MG/ML
5 INJECTION INTRAMUSCULAR ONCE
Status: COMPLETED | OUTPATIENT
Start: 2023-03-12 | End: 2023-03-12

## 2023-03-12 RX ORDER — POTASSIUM CHLORIDE 20 MEQ/1
40 TABLET, EXTENDED RELEASE ORAL ONCE
Status: COMPLETED | OUTPATIENT
Start: 2023-03-12 | End: 2023-03-12

## 2023-03-12 RX ADMIN — POTASSIUM CHLORIDE 40 MEQ: 1500 TABLET, EXTENDED RELEASE ORAL at 10:31

## 2023-03-12 RX ADMIN — HALOPERIDOL LACTATE 5 MG: 5 INJECTION, SOLUTION INTRAMUSCULAR at 10:31

## 2023-03-12 ASSESSMENT — PAIN - FUNCTIONAL ASSESSMENT
PAIN_FUNCTIONAL_ASSESSMENT: NONE - DENIES PAIN
PAIN_FUNCTIONAL_ASSESSMENT: NONE - DENIES PAIN

## 2023-03-12 NOTE — ED NOTES
Pt agreeable to discharge to crisis unit and contracts for safety.  Pt to be escorted to waiting area once taxi arrives     Orvan Koyanagi, [facility]  03/12/23 1177

## 2023-03-12 NOTE — ED NOTES
Pt reports that \"I just noticed my foot and my toes are going numb\"  This arm assessed rt foot she shoes thiss rn a area of edema to ant aspect of foot pt doesn't report pain with palp and si able to wiggle toes with movement anterior and posterior. Informed dr Velazquez Massed no new orders given.      Joan Chauhan RN  03/12/23 1019

## 2023-03-12 NOTE — ED NOTES
Pt has been accepted by Nay Fletcher at the [facility]. SW will set up transport to [facility] through [facility].       Daisy Anderson  03/12/23 6567

## 2023-03-12 NOTE — ED NOTES
Behavioral Health Crisis Assessment      Chief Complaint: \"I'm hearing voices for the past few days. \"    Mental Status Exam: Pt is alert and oriented x3, calm and cooperative, intense eye contact, denies suicidal and homicidal ideations, endorsed auditory hallucinations, currently denies visual hallucinations, fair hygiene, speech is clear in tone, insight and judgment are fair    Legal Status:  [x] Voluntary:  [] Involuntary, Issued by:    Gender:  [] Male [x] Female [] Transgender  [] Other    Sexual Orientation:  [x] Heterosexual [] Homosexual [] Bisexual [] Other    Brief Clinical Summary: Pt is a 28year old female presenting to the ED with hallucinations- auditory voices, telling her that Mitchell Manner has harmed people. \" Pt denies these voices are commanding her to hurt herself, or anyone else. Pt reports she has an upcoming appointment with [facility] on 3/21/23. Pt states last time she was there was over a year ago. Pt reports no previous suicide attempts but states she has a history of cutting herself superficially. Pt reports she was inpatient at [address] and [facility] in the past and has attended [facility] for outpatient last year. Pt denies any suicidal ideation, plan or intentions. Pt admits to having visual hallucinations of a dark figure, but states she has not seen this in a while. Pt reports smoking marijuana and drinking alcohol on occasions. Pt states she drinks 3-4 times a month. Denies needing AoD treatment. 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 Pt states she feels safe in her home. Pt denies any violent history and does not have access to any weapons. Discussed dispo to Crisis Unit with ED doctor. Referral being faxed over.     Collateral Information: none    Risk Factors:  Mental health diagnosis- Paranoid schizophrenia, Major Depressive Disorder  Not currently on MH medications  Substance use  Previous inpatient psych admissions    Protective Factors:  Employed/ Steady income  Outpatient provider- Martin Perez  Help seeking behavior  Goals and hopes for the future  No access to weapons  Good communication skills    Suicidal Ideations:  [] Reports:    [] Past [] Present   [x] Denies    Suicide Attempts:  [] Reports:   [x] Denies    C-SSRS Screening Completed by RN: Current Suicide Risk:  [x] No Risk [] Low [] Moderate [] High    Homicidal Ideations:  [] Reports:   [] Past [] Present   [x] Denies     Self Injurious/Self Mutilation Behaviors:  [x] Reports:    [x] Past [] Present when she was 11-16 years old, pt reports she cut herself  [] Denies    Hallucinations/Delusions:  [x] Reports: hears voices, sees dark figure at times  [] Denies     Substance Use/Alcohol Use/Addiction:  [x] Reports:   [] Denies   [x] SBIRT Screen Complete. 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 [x]  No  []  Yes (Ask the questions listed below)   When? Did you follow through with the thoughts? [x] No     [] Yes- When and what happened? 2.  Have you ever threatened anyone? [x]  No  []  Yes (Ask the questions listed below)   When and what happened? Have you ever threatened someone with a gun, knife or other weapon? [x]  No  []  Yes - When and what happened? 2. Have you ever had an order of protection taken out against you? []  Yes [x]  No  3.  Have you ever been arrested due to violence? []  Yes [x]  No  4. Have you ever been cruel to animals?  []  Yes [x]  No    After consideration of C-SSRS screening results, C-SSRS assessments, and this professional's assessment the patient's overall suicide risk assessed to be:  [x] No Risk  [] Low   [] Moderate   [] High     [x] Discussed current suicide risk, protective and risk factors with RN and ED Physician     Disposition:  [] Home:   [] Outpatient Provider:   [x] Crisis Unit:   [] Inpatient Psychiatric Unit:  [] Other:                    Leatha Marino  03/12/23 [phone]

## 2023-03-12 NOTE — ED NOTES
Pt now requesting her medication when asked what medication she is referring to sh states when I come her they usually give me a shot.   This rn inquires if she is having anxiety or any other symptoms \" yeah and for my schizophrenia  I'm hearing voices\"      Joan Chauhan, SHERIE  03/12/23 5282

## 2023-03-12 NOTE — ED NOTES
Called to check with [facility], reports they have spoken with [employer] and faxed paperwork. Specifically confirmed they requested taxi to go to Emergency entrance. Relayed to [facility] AN AFFILIATE OF [facility] nurse Dalila Schultz. [facility] [place], [place]  03/12/23 6686    Call to [employer] - reports taxi on it's way, reports only one taxi working at this time. Advised [facility] nurse Dalila Schultz.         [facility] [place], [place]  03/12/23 4851

## 2023-03-12 NOTE — ED PROVIDER NOTES
HPI:  3/12/23, Time: 11:07 AM EDT         Newton Slaughter is a 28 y.o. female presenting to the ED for psychiatric evaluations. Patient states she is hallucinating. She is hearing voices. She says these voices are telling her that she her people that she never her. Does have a history of schizophrenia, she has not take her medications on some time. No suicidal ideation. No homicidal ideation or attempt. No other symptoms or complaints. Review of Systems:   A complete review of systems was performed and pertinent positives and negatives are stated within HPI, all other systems reviewed and are negative.          --------------------------------------------- PAST HISTORY ---------------------------------------------  Past Medical History:  has a past medical history of Concussion with loss of consciousness of 30 minutes or less, Herpes genitalia, Paranoid schizophrenia ([facility] [address] Seizures ([facility] [address] and Ulcer. Past Surgical History:  has no past surgical history on file. Social History:  reports that she quit smoking about 13 months ago. Her smoking use included cigarettes. She has never used smokeless tobacco. She reports current alcohol use. She reports current drug use. Frequency: 14.00 times per week. Drug: Marijuana Samia [employer]ions). Family History: family history includes Diabetes in her father and mother; Hypertension in her maternal grandmother; Other in her brother. The patients home medications have been reviewed.     Allergies: Cephalosporins, Cherry, Cherry flavor, Other, Peanut-containing drug products, Strawberry extract, Strawberry flavor, and Sulfamethoxazole-trimethoprim    -------------------------------------------------- RESULTS -------------------------------------------------  All laboratory and radiology results have been personally reviewed by myself   LABS:  Results for orders placed or performed during the hospital encounter of 03/12/23   COVID-19 & Influenza Combo    Specimen: Nasopharyngeal Swab   Result Value Ref Range    SARS-CoV-2 RNA, RT PCR NOT DETECTED NOT DETECTED    INFLUENZA A NOT DETECTED NOT DETECTED    INFLUENZA B NOT DETECTED NOT DETECTED   CBC with Auto Differential   Result Value Ref Range    WBC 4.2 (L) 4.5 - 11.5 E9/L    RBC 4.75 3.50 - 5.50 E12/L    Hemoglobin 13.8 11.5 - 15.5 g/dL    Hematocrit 41.5 34.0 - 48.0 %    MCV 87.4 80.0 - 99.9 fL    MCH 29.1 26.0 - 35.0 pg    MCHC 33.3 32.0 - 34.5 %    RDW 13.8 11.5 - 15.0 fL    Platelets 888 396 - 757 E9/L    MPV 10.6 7.0 - 12.0 fL    Neutrophils % 74.8 43.0 - 80.0 %    Immature Granulocytes % 0.0 0.0 - 5.0 %    Lymphocytes % 17.9 (L) 20.0 - 42.0 %    Monocytes % 6.6 2.0 - 12.0 %    Eosinophils % 0.2 0.0 - 6.0 %    Basophils % 0.5 0.0 - 2.0 %    Neutrophils Absolute 3.17 1.80 - 7.30 E9/L    Immature Granulocytes # 0.00 E9/L    Lymphocytes Absolute 0.76 (L) 1.50 - 4.00 E9/L    Monocytes Absolute 0.28 0.10 - 0.95 E9/L    Eosinophils Absolute 0.01 (L) 0.05 - 0.50 E9/L    Basophils Absolute 0.02 0.00 - 0.20 E9/L   Comprehensive Metabolic Panel   Result Value Ref Range    Sodium 142 132 - 146 mmol/L    Potassium 3.1 (L) 3.5 - 5.0 mmol/L    Chloride 105 98 - 107 mmol/L    CO2 24 22 - 29 mmol/L    Anion Gap 13 7 - 16 mmol/L    Glucose 140 (H) 74 - 99 mg/dL    BUN 18 6 - 20 mg/dL    Creatinine 0.6 0.5 - 1.0 mg/dL    Est, Glom Filt Rate >60 >=60 mL/min/1.73    Calcium 9.0 8.6 - 10.2 mg/dL    Total Protein 7.6 6.4 - 8.3 g/dL    Albumin 4.6 3.5 - 5.2 g/dL    Total Bilirubin 0.5 0.0 - 1.2 mg/dL    Alkaline Phosphatase 58 35 - 104 U/L    ALT 42 (H) 0 - 32 U/L    AST 33 (H) 0 - 31 U/L   Serum Drug Screen   Result Value Ref Range    Ethanol Lvl <10 mg/dL    Acetaminophen Level <5.0 (L) 10.0 - 72.4 mcg/mL    Salicylate, Serum <3.2 0.0 - 30.0 mg/dL    TCA Scrn NEGATIVE Cutoff:300 ng/mL   Urine Drug Screen   Result Value Ref Range    Amphetamine Screen, Urine NOT DETECTED Negative <1000 ng/mL    Barbiturate Screen, Ur NOT DETECTED Negative < 200 ng/mL    Benzodiazepine Screen, Urine NOT DETECTED Negative < 200 ng/mL    Cannabinoid Scrn, Ur POSITIVE (A) Negative < 50ng/mL    Cocaine Metabolite Screen, Urine NOT DETECTED Negative < 300 ng/mL    Opiate Scrn, Ur NOT DETECTED Negative < 300ng/mL    PCP Screen, Urine NOT DETECTED Negative < 25 ng/mL    Methadone Screen, Urine NOT DETECTED Negative <300 ng/mL    Oxycodone Urine NOT DETECTED Negative <100 ng/mL    FENTANYL SCREEN, URINE NOT DETECTED Negative <1 ng/mL    Drug Screen Comment: see below    Pregnancy, Urine   Result Value Ref Range    HCG(Urine) Pregnancy Test NEGATIVE NEGATIVE   EKG 12 Lead   Result Value Ref Range    Ventricular Rate 87 BPM    Atrial Rate 87 BPM    P-R Interval 130 ms    QRS Duration 94 ms    Q-T Interval 370 ms    QTc Calculation (Bazett) 445 ms    P Axis 0 degrees    R Axis 56 degrees    T Axis 32 degrees       RADIOLOGY:  Interpreted by Radiologist.  No orders to display       ------------------------- NURSING NOTES AND VITALS REVIEWED ---------------------------   The nursing notes within the ED encounter and vital signs as below have been reviewed. BP (!) 143/100   Pulse (!) 107   Temp 98.3 °F (36.8 °C)   Resp 16   Ht 5' 3.5\" (1.613 m)   LMP 03/12/2023 (Exact Date) Comment: on now  SpO2 97%   BMI 40.10 kg/m²   Oxygen Saturation Interpretation: Normal      ---------------------------------------------------PHYSICAL EXAM--------------------------------------      Constitutional/General: Alert and oriented x3, well appearing, non toxic in NAD  Head: Normocephalic and atraumatic  Eyes: PERRL, EOMI  Mouth: Oropharynx clear, handling secretions, no trismus  Neck: Supple, full ROM,   Pulmonary: Lungs clear to auscultation bilaterally, no wheezes, rales, or rhonchi. Not in respiratory distress  Cardiovascular:  Regular rate and rhythm, no murmurs, gallops, or rubs. 2+ distal pulses  Abdomen: Soft, non tender, non distended,   Extremities: Moves all extremities x 4.  Warm and well perfused  Skin: warm and dry without rash  Neurologic: GCS 15,  Psych: Normal Affect      ------------------------------ ED COURSE/MEDICAL DECISION MAKING----------------------  Medications   potassium chloride (KLOR-CON M) extended release tablet 40 mEq (40 mEq Oral Given 3/12/23 1031)   haloperidol lactate (HALDOL) injection 5 mg (5 mg IntraMUSCular Given 3/12/23 1031)         ED COURSE:       Medical Decision Making:      Patient presents for hallucinations. Concern for electrolyte abnormality, illicit drug use, medical noncompliance, among other pathologies. She did receive 5 mg of Haldol as she seemed very anxious. Otherwise exam was unremarkable. EKG interpreted by me shows sinus rhythm, 87, no STEMI, no ischemic change    Labs interpreted by me shows a CBC with a leukopenia 4.2, serum drug unremarkable, urine drug screen positive for marijuana, pregnancy test is negative, chemistry panel shows a hypokalemia for 3.1. Otherwise labs reviewed as above. Chart reviewed. Patient was admitted for auditory hallucinations on 4/18/2022. It was 2 our psychiatric service here. She was discharged with motor medications, she voices that she has not been taking them. Patient did receive p.o. potassium. Social work consulted as the patient is now medically clear, to be evaluate social work for psychiatric placement. Counseling: The emergency provider has spoken with the patient and discussed todays results, in addition to providing specific details for the plan of care and counseling regarding the diagnosis and prognosis. Questions are answered at this time and they are agreeable with the plan.      --------------------------------- IMPRESSION AND DISPOSITION ---------------------------------    IMPRESSION  1.  Hallucination        DISPOSITION  Disposition: Admit to mental health unit - medically cleared for admission  Patient condition is stable      NOTE: This report was transcribed using voice recognition software.  Every effort was made to ensure accuracy; however, inadvertent computerized transcription errors may be present        Diamante Santos MD  03/12/23 3625       Diamante Santos MD  03/12/23 9875

## 2023-03-12 NOTE — DISCHARGE INSTRUCTIONS
Go directly to [facility] and follow up with psychiatrist/NP on site     Help Hotline 35-0276579643 or [phone] or 211   24 hour crisis line for the following [address]. Reid Bellamy    PEER [facility]ARM LINE: [phone]  Monday through Friday 4pm to 8pm and Saturday 1pm-3pm   You can call during these times to talk with a peer support person as needed

## 2023-03-13 LAB
EKG ATRIAL RATE: 87 BPM
EKG P AXIS: 0 DEGREES
EKG P-R INTERVAL: 130 MS
EKG Q-T INTERVAL: 370 MS
EKG QRS DURATION: 94 MS
EKG QTC CALCULATION (BAZETT): 445 MS
EKG R AXIS: 56 DEGREES
EKG T AXIS: 32 DEGREES
EKG VENTRICULAR RATE: 87 BPM

## 2023-03-13 PROCEDURE — 93010 ELECTROCARDIOGRAM REPORT: CPT | Performed by: INTERNAL MEDICINE

## 2023-03-15 ENCOUNTER — HOSPITAL ENCOUNTER (INPATIENT)
Age: 33
LOS: 12 days | Discharge: OTHER INSTITUTION | DRG: 750 | End: 2023-03-27
Attending: EMERGENCY MEDICINE | Admitting: PSYCHIATRY & NEUROLOGY
Payer: COMMERCIAL

## 2023-03-15 DIAGNOSIS — F23 ACUTE PSYCHOSIS (HCC): Primary | ICD-10-CM

## 2023-03-15 PROBLEM — F31.12 BIPOLAR 1 DISORDER, MANIC, MODERATE (HCC): Status: ACTIVE | Noted: 2023-03-15

## 2023-03-15 LAB
ACETAMINOPHEN LEVEL: <5 MCG/ML (ref 10–30)
ALBUMIN SERPL-MCNC: 4.7 G/DL (ref 3.5–5.2)
ALP BLD-CCNC: 52 U/L (ref 35–104)
ALT SERPL-CCNC: 43 U/L (ref 0–32)
AMPHETAMINE SCREEN, URINE: NOT DETECTED
ANION GAP SERPL CALCULATED.3IONS-SCNC: 12 MMOL/L (ref 7–16)
AST SERPL-CCNC: 50 U/L (ref 0–31)
BARBITURATE SCREEN URINE: NOT DETECTED
BASOPHILS ABSOLUTE: 0.03 E9/L (ref 0–0.2)
BASOPHILS RELATIVE PERCENT: 0.5 % (ref 0–2)
BENZODIAZEPINE SCREEN, URINE: NOT DETECTED
BILIRUB SERPL-MCNC: 0.7 MG/DL (ref 0–1.2)
BUN BLDV-MCNC: 19 MG/DL (ref 6–20)
CALCIUM SERPL-MCNC: 9.4 MG/DL (ref 8.6–10.2)
CANNABINOID SCREEN URINE: POSITIVE
CHLORIDE BLD-SCNC: 101 MMOL/L (ref 98–107)
CO2: 24 MMOL/L (ref 22–29)
COCAINE METABOLITE SCREEN URINE: NOT DETECTED
CREAT SERPL-MCNC: 0.7 MG/DL (ref 0.5–1)
EKG ATRIAL RATE: 74 BPM
EKG P-R INTERVAL: 132 MS
EKG Q-T INTERVAL: 386 MS
EKG QRS DURATION: 84 MS
EKG QTC CALCULATION (BAZETT): 428 MS
EKG R AXIS: 33 DEGREES
EKG T AXIS: 23 DEGREES
EKG VENTRICULAR RATE: 74 BPM
EOSINOPHILS ABSOLUTE: 0.02 E9/L (ref 0.05–0.5)
EOSINOPHILS RELATIVE PERCENT: 0.4 % (ref 0–6)
ETHANOL: <10 MG/DL (ref 0–0.08)
FENTANYL SCREEN, URINE: NOT DETECTED
GFR SERPL CREATININE-BSD FRML MDRD: >60 ML/MIN/1.73
GLUCOSE BLD-MCNC: 119 MG/DL (ref 74–99)
HCG, URINE, POC: NEGATIVE
HCT VFR BLD CALC: 43.1 % (ref 34–48)
HEMOGLOBIN: 14.4 G/DL (ref 11.5–15.5)
IMMATURE GRANULOCYTES #: 0.06 E9/L
IMMATURE GRANULOCYTES %: 1.1 % (ref 0–5)
INFLUENZA A: NOT DETECTED
INFLUENZA B: NOT DETECTED
LYMPHOCYTES ABSOLUTE: 1.08 E9/L (ref 1.5–4)
LYMPHOCYTES RELATIVE PERCENT: 19.7 % (ref 20–42)
Lab: ABNORMAL
Lab: NORMAL
MAGNESIUM: 2.2 MG/DL (ref 1.6–2.6)
MCH RBC QN AUTO: 29.3 PG (ref 26–35)
MCHC RBC AUTO-ENTMCNC: 33.4 % (ref 32–34.5)
MCV RBC AUTO: 87.8 FL (ref 80–99.9)
METHADONE SCREEN, URINE: NOT DETECTED
MONOCYTES ABSOLUTE: 0.37 E9/L (ref 0.1–0.95)
MONOCYTES RELATIVE PERCENT: 6.8 % (ref 2–12)
NEGATIVE QC PASS/FAIL: NORMAL
NEUTROPHILS ABSOLUTE: 3.92 E9/L (ref 1.8–7.3)
NEUTROPHILS RELATIVE PERCENT: 71.5 % (ref 43–80)
OPIATE SCREEN URINE: NOT DETECTED
OXYCODONE URINE: NOT DETECTED
PDW BLD-RTO: 13.5 FL (ref 11.5–15)
PHENCYCLIDINE SCREEN URINE: NOT DETECTED
PLATELET # BLD: 260 E9/L (ref 130–450)
PMV BLD AUTO: 11.2 FL (ref 7–12)
POSITIVE QC PASS/FAIL: NORMAL
POTASSIUM SERPL-SCNC: 4.3 MMOL/L (ref 3.5–5)
RBC # BLD: 4.91 E12/L (ref 3.5–5.5)
SALICYLATE, SERUM: <0.3 MG/DL (ref 0–30)
SARS-COV-2 RNA, RT PCR: NOT DETECTED
SODIUM BLD-SCNC: 137 MMOL/L (ref 132–146)
TOTAL PROTEIN: 8.2 G/DL (ref 6.4–8.3)
WBC # BLD: 5.5 E9/L (ref 4.5–11.5)

## 2023-03-15 PROCEDURE — 93005 ELECTROCARDIOGRAM TRACING: CPT | Performed by: EMERGENCY MEDICINE

## 2023-03-15 PROCEDURE — 6370000000 HC RX 637 (ALT 250 FOR IP): Performed by: PSYCHIATRY & NEUROLOGY

## 2023-03-15 PROCEDURE — 80143 DRUG ASSAY ACETAMINOPHEN: CPT

## 2023-03-15 PROCEDURE — 87636 SARSCOV2 & INF A&B AMP PRB: CPT

## 2023-03-15 PROCEDURE — 36415 COLL VENOUS BLD VENIPUNCTURE: CPT

## 2023-03-15 PROCEDURE — 6360000002 HC RX W HCPCS: Performed by: STUDENT IN AN ORGANIZED HEALTH CARE EDUCATION/TRAINING PROGRAM

## 2023-03-15 PROCEDURE — 80307 DRUG TEST PRSMV CHEM ANLYZR: CPT

## 2023-03-15 PROCEDURE — 82077 ASSAY SPEC XCP UR&BREATH IA: CPT

## 2023-03-15 PROCEDURE — 83735 ASSAY OF MAGNESIUM: CPT

## 2023-03-15 PROCEDURE — 96372 THER/PROPH/DIAG INJ SC/IM: CPT

## 2023-03-15 PROCEDURE — 99285 EMERGENCY DEPT VISIT HI MDM: CPT

## 2023-03-15 PROCEDURE — 93010 ELECTROCARDIOGRAM REPORT: CPT | Performed by: INTERNAL MEDICINE

## 2023-03-15 PROCEDURE — 6370000000 HC RX 637 (ALT 250 FOR IP): Performed by: NURSE PRACTITIONER

## 2023-03-15 PROCEDURE — 80179 DRUG ASSAY SALICYLATE: CPT

## 2023-03-15 PROCEDURE — 1240000000 HC EMOTIONAL WELLNESS R&B

## 2023-03-15 PROCEDURE — 80053 COMPREHEN METABOLIC PANEL: CPT

## 2023-03-15 PROCEDURE — 85025 COMPLETE CBC W/AUTO DIFF WBC: CPT

## 2023-03-15 RX ORDER — HALOPERIDOL 5 MG/ML
5 INJECTION INTRAMUSCULAR EVERY 4 HOURS PRN
Status: DISCONTINUED | OUTPATIENT
Start: 2023-03-15 | End: 2023-03-27 | Stop reason: HOSPADM

## 2023-03-15 RX ORDER — MAGNESIUM HYDROXIDE/ALUMINUM HYDROXICE/SIMETHICONE 120; 1200; 1200 MG/30ML; MG/30ML; MG/30ML
30 SUSPENSION ORAL EVERY 6 HOURS PRN
Status: DISCONTINUED | OUTPATIENT
Start: 2023-03-15 | End: 2023-03-27 | Stop reason: HOSPADM

## 2023-03-15 RX ORDER — TRAZODONE HYDROCHLORIDE 50 MG/1
50 TABLET ORAL NIGHTLY PRN
Status: DISCONTINUED | OUTPATIENT
Start: 2023-03-15 | End: 2023-03-20

## 2023-03-15 RX ORDER — HYDROXYZINE PAMOATE 25 MG/1
50 CAPSULE ORAL 3 TIMES DAILY PRN
Status: DISCONTINUED | OUTPATIENT
Start: 2023-03-15 | End: 2023-03-27 | Stop reason: HOSPADM

## 2023-03-15 RX ORDER — HALOPERIDOL 5 MG/1
5 TABLET ORAL EVERY 4 HOURS PRN
Status: DISCONTINUED | OUTPATIENT
Start: 2023-03-15 | End: 2023-03-27 | Stop reason: HOSPADM

## 2023-03-15 RX ORDER — ACETAMINOPHEN 325 MG/1
650 TABLET ORAL EVERY 4 HOURS PRN
Status: DISCONTINUED | OUTPATIENT
Start: 2023-03-15 | End: 2023-03-27 | Stop reason: HOSPADM

## 2023-03-15 RX ORDER — MIDAZOLAM HYDROCHLORIDE 2 MG/2ML
1 INJECTION, SOLUTION INTRAMUSCULAR; INTRAVENOUS ONCE
Status: COMPLETED | OUTPATIENT
Start: 2023-03-15 | End: 2023-03-15

## 2023-03-15 RX ORDER — DROPERIDOL 2.5 MG/ML
2.5 INJECTION, SOLUTION INTRAMUSCULAR; INTRAVENOUS EVERY 6 HOURS PRN
Status: DISCONTINUED | OUTPATIENT
Start: 2023-03-15 | End: 2023-03-27 | Stop reason: HOSPADM

## 2023-03-15 RX ADMIN — HALOPERIDOL 5 MG: 5 TABLET ORAL at 17:00

## 2023-03-15 RX ADMIN — TRAZODONE HYDROCHLORIDE 50 MG: 50 TABLET ORAL at 21:24

## 2023-03-15 RX ADMIN — HALOPERIDOL 5 MG: 5 TABLET ORAL at 21:33

## 2023-03-15 RX ADMIN — DROPERIDOL 2.5 MG: 2.5 INJECTION, SOLUTION INTRAMUSCULAR; INTRAVENOUS at 15:00

## 2023-03-15 RX ADMIN — ALUMINUM HYDROXIDE, MAGNESIUM HYDROXIDE, AND SIMETHICONE 30 ML: 200; 200; 20 SUSPENSION ORAL at 20:31

## 2023-03-15 RX ADMIN — NICOTINE POLACRILEX 4 MG: 2 GUM, CHEWING BUCCAL at 20:31

## 2023-03-15 RX ADMIN — MIDAZOLAM 1 MG: 1 INJECTION, SOLUTION INTRAMUSCULAR; INTRAVENOUS at 15:46

## 2023-03-15 RX ADMIN — NICOTINE POLACRILEX 4 MG: 2 GUM, CHEWING BUCCAL at 22:33

## 2023-03-15 RX ADMIN — DROPERIDOL 2.5 MG: 2.5 INJECTION, SOLUTION INTRAMUSCULAR; INTRAVENOUS at 15:25

## 2023-03-15 ASSESSMENT — PAIN - FUNCTIONAL ASSESSMENT
PAIN_FUNCTIONAL_ASSESSMENT: NONE - DENIES PAIN
PAIN_FUNCTIONAL_ASSESSMENT: NONE - DENIES PAIN

## 2023-03-15 ASSESSMENT — LIFESTYLE VARIABLES
HOW MANY STANDARD DRINKS CONTAINING ALCOHOL DO YOU HAVE ON A TYPICAL DAY: 1 OR 2
HOW OFTEN DO YOU HAVE A DRINK CONTAINING ALCOHOL: MONTHLY OR LESS

## 2023-03-15 ASSESSMENT — SLEEP AND FATIGUE QUESTIONNAIRES
DO YOU USE A SLEEP AID: NO
DO YOU HAVE DIFFICULTY SLEEPING: YES
SLEEP PATTERN: DIFFICULTY FALLING ASLEEP
AVERAGE NUMBER OF SLEEP HOURS: 6

## 2023-03-15 NOTE — ED NOTES
Call to the pt's contact on file- Delmer dolan mom- [phone]- she stated that she has not seen the pt in over a yr and has no info on her and has since  her father. Call to pt's brother Bella Vick- [phone]. Number is not in service.      Daylin Ho, [facility]  03/15/23 7274

## 2023-03-15 NOTE — ED NOTES
Pt displaying manic behavior, talking to hallucinations and auditory voices. Pt attempted to leave ER with no exact direction in mind three times and was directed back to bed by RN each time. Pt placed in sight of RN. Pt given sandwich and drink after work up was complete. Pt reports being schizophrenic. Pt keeps talking to the illuminati and telling them to stop assaulting her daughter. Pt states the voices are not telling her to harm anyone or herself. Pt reports no HI or SI ideations. Pt reports occasional use of weed.    Osorio Ford RN  03/15/23 0536       Osorio Ford RN  03/15/23 [address] RN  03/15/23 8382

## 2023-03-15 NOTE — ED NOTES
Called into room as patient has gotten out of bed, is agitated and pacing, states she is feeling very anxious, acting bizarrely. She is not acting aggressively or disruptive, but states she is feeling very anxious, states she would take some medication if offered to help her relax. Ordered 2.5 IM droperidol. QTc on EKG is not prolonged.     Yon Gallego MD PGY-3  3/15/2023 2:47 PM       King Velasco MD  Resident  03/15/23 5451

## 2023-03-15 NOTE — ED PROVIDER NOTES
medical work-up for medical clearance with a CBC CMP urine drug screen serum drug screen COVID flu test as well as pregnancy test and EKG. patient's laboratory work-up showed a normal CMP except for glucose of 119 with an ALT and AST of 43 and 50. Serum drug screen negative. Urine drug screen was positive for THC. Pregnancy test negative. COVID and flu test negative. Patient medically cleared at this time.  consulted. Disposition pending  evaluation. CONSULTS: (Who and What was discussed)  None        I am the Primary Clinician of Record. FINAL IMPRESSION      1. Acute psychosis ([facility] [address]          DISPOSITION/PLAN     DISPOSITION Ed Observation 03/15/2023 06:07:33 AM      PATIENT REFERRED TO:  No follow-up provider specified.     DISCHARGE MEDICATIONS:  New Prescriptions    No medications on file       DISCONTINUED MEDICATIONS:  Discontinued Medications    No medications on file              (Please note that portions of this note were completed with a voice recognition program.  Efforts were made to edit the dictations but occasionally words are mis-transcribed.)    Harsh Wilks DO (electronically signed)            Harsh Wilks DO  03/15/23 [id number] [id number], DO  03/15/23 5620

## 2023-03-15 NOTE — ED NOTES
Behavioral Health Crisis Assessment    Chief Complaint: (Psych eval from crisis unit, manic behavior, +auditory and +visual hallucinations)    Legal Status:  [] Voluntary:  [x] Involuntary, Issued by:  ed doc    Gender:  [] Male [x] Female [] Transgender  [] Other    Sexual Orientation:  unable to assess  [] Heterosexual [] Homosexual [] Bisexual [] Other    Brief Clinical Summary & MSE:  Pt is a poor historian, is actively hallucinating, internally stimulated, talking to the walls and making senseless, wander the unit and unable to stay on task. Pt sits on her bed and appears to be catatonic at times. Pt is delusional and paranoid. She is saying to no one \"I didn't, I can't\". She is fearful and very unstable. Pt believes that people are accusing her of things. She is very confused. Pt is a poor historian    Collateral Information:  Pt was at [facility] for just a few days as a diversion. CSU staff sent her in after she was digging through the trash and looking for her baby. Pt was  believing that things were chasing her.      Risk Factors:  Mental Health Diagnosis  - psychosis  Substance Use  Possible History of Trauma  Lack of self-care  Several inpatient psychiatric admissions - 2 in 2022  Most likely Off prescribed Psych meds  Unsure of out pt provider  Poor communication skills    Protective Factors:  Strong family/friend support  Unsure Outpatient provider  Safe and stable housing  Has access to essential needs  Active in the community  No access to weapons     Suicidal Ideations:  [] Reports:    [] Past [] Present   [x] Denies    Suicide Attempts:  [] Reports:   [x] Denies    C-SSRS Screening Completed by RN: Current Suicide Risk: unable to assess  [] No Risk [] Low [] Moderate [] High    Homicidal Ideations:  [] Reports:   [] Past [] Present   [x] Denies     Self Injurious/Self Mutilation Behaviors:  [] Reports:    [] Past [] Present   [x] Denies    Hallucinations/Delusions:  [x] Reports:   [] Denies

## 2023-03-15 NOTE — ED NOTES
Pt up in room talking to someone not in room. Pt has blank stare and detached. Pt replies appropriately but exhibiting very bizarre behavior.      Jakie Lesches, RN  03/15/23 8553

## 2023-03-15 NOTE — ED NOTES
Pt attempted to get out of bed again stating \"someone is screaming I need to go check on her,\" Pt was directed to lay back in bed again and it was explained that no one is screaming right now.  Pt re-oriented and laying in bed     Hector Payton RN  03/15/23 5181

## 2023-03-15 NOTE — LETTER
[address] [address]  Phone: [phone]         March 27, 2023     Patient: Ivania Krishnamurthy   YOB: 1990   Date of Visit: 3/15/2023       To Whom It May Concern:    Please be advised that Cheikh Jj was admitted to [facility] on 3/15/2023 and discharged on 3/27/2023. Sincerely,    Zoe Halye MSW, LSW.          Signature:__________________________________

## 2023-03-16 PROBLEM — F31.12 BIPOLAR 1 DISORDER, MANIC, MODERATE (HCC): Status: RESOLVED | Noted: 2023-03-15 | Resolved: 2023-03-16

## 2023-03-16 PROCEDURE — 1240000000 HC EMOTIONAL WELLNESS R&B

## 2023-03-16 PROCEDURE — 90792 PSYCH DIAG EVAL W/MED SRVCS: CPT | Performed by: NURSE PRACTITIONER

## 2023-03-16 PROCEDURE — 6370000000 HC RX 637 (ALT 250 FOR IP): Performed by: PSYCHIATRY & NEUROLOGY

## 2023-03-16 PROCEDURE — 6370000000 HC RX 637 (ALT 250 FOR IP): Performed by: NURSE PRACTITIONER

## 2023-03-16 PROCEDURE — 6360000002 HC RX W HCPCS: Performed by: NURSE PRACTITIONER

## 2023-03-16 RX ORDER — RISPERIDONE 0.5 MG/1
1 TABLET, ORALLY DISINTEGRATING ORAL 2 TIMES DAILY
Status: DISCONTINUED | OUTPATIENT
Start: 2023-03-16 | End: 2023-03-17

## 2023-03-16 RX ORDER — OXCARBAZEPINE 150 MG/1
150 TABLET, FILM COATED ORAL 2 TIMES DAILY
Status: DISCONTINUED | OUTPATIENT
Start: 2023-03-16 | End: 2023-03-17

## 2023-03-16 RX ORDER — BUPRENORPHINE HYDROCHLORIDE AND NALOXONE HYDROCHLORIDE DIHYDRATE 8; 2 MG/1; MG/1
1 TABLET SUBLINGUAL 2 TIMES DAILY
Status: DISCONTINUED | OUTPATIENT
Start: 2023-03-16 | End: 2023-03-27 | Stop reason: HOSPADM

## 2023-03-16 RX ADMIN — BUPRENORPHINE AND NALOXONE 1 TABLET: 8; 2 TABLET SUBLINGUAL at 12:45

## 2023-03-16 RX ADMIN — OXCARBAZEPINE 150 MG: 150 TABLET, FILM COATED ORAL at 21:33

## 2023-03-16 RX ADMIN — HYDROXYZINE PAMOATE 50 MG: 50 CAPSULE ORAL at 05:04

## 2023-03-16 RX ADMIN — HALOPERIDOL 5 MG: 5 TABLET ORAL at 11:11

## 2023-03-16 RX ADMIN — HALOPERIDOL 5 MG: 5 TABLET ORAL at 05:04

## 2023-03-16 RX ADMIN — RISPERIDONE 1 MG: 0.5 TABLET, ORALLY DISINTEGRATING ORAL at 13:44

## 2023-03-16 RX ADMIN — OXCARBAZEPINE 150 MG: 150 TABLET, FILM COATED ORAL at 12:44

## 2023-03-16 RX ADMIN — NICOTINE POLACRILEX 4 MG: 2 GUM, CHEWING BUCCAL at 09:20

## 2023-03-16 RX ADMIN — RISPERIDONE 1 MG: 0.5 TABLET, ORALLY DISINTEGRATING ORAL at 21:33

## 2023-03-16 RX ADMIN — ALUMINUM HYDROXIDE, MAGNESIUM HYDROXIDE, AND SIMETHICONE 30 ML: 200; 200; 20 SUSPENSION ORAL at 06:19

## 2023-03-16 RX ADMIN — HYDROXYZINE PAMOATE 50 MG: 50 CAPSULE ORAL at 21:33

## 2023-03-16 RX ADMIN — NICOTINE POLACRILEX 4 MG: 2 GUM, CHEWING BUCCAL at 21:36

## 2023-03-16 RX ADMIN — BUPRENORPHINE AND NALOXONE 1 TABLET: 8; 2 TABLET SUBLINGUAL at 21:33

## 2023-03-16 RX ADMIN — TRAZODONE HYDROCHLORIDE 50 MG: 50 TABLET ORAL at 21:33

## 2023-03-16 RX ADMIN — NICOTINE POLACRILEX 4 MG: 2 GUM, CHEWING BUCCAL at 11:15

## 2023-03-16 RX ADMIN — HYDROXYZINE PAMOATE 50 MG: 50 CAPSULE ORAL at 11:11

## 2023-03-16 ASSESSMENT — SLEEP AND FATIGUE QUESTIONNAIRES: DO YOU HAVE DIFFICULTY SLEEPING: YES

## 2023-03-16 ASSESSMENT — PAIN SCALES - GENERAL
PAINLEVEL_OUTOF10: 0

## 2023-03-16 ASSESSMENT — LIFESTYLE VARIABLES
HOW OFTEN DO YOU HAVE A DRINK CONTAINING ALCOHOL: 2-3 TIMES A WEEK
HOW MANY STANDARD DRINKS CONTAINING ALCOHOL DO YOU HAVE ON A TYPICAL DAY: 1 OR 2

## 2023-03-16 ASSESSMENT — PAIN - FUNCTIONAL ASSESSMENT: PAIN_FUNCTIONAL_ASSESSMENT: ACTIVITIES ARE NOT PREVENTED

## 2023-03-16 NOTE — GROUP NOTE
Group Therapy Note    Date: 3/16/2023    Group Start Time: 1000  Group End Time: 2111  Group Topic: Cognitive Skills    [facility]    Thankful YNES Cody LSW        Group Therapy Note    Attendees: 15       Patient's Goal:  Pt attended community support meeting where we discussed daily goals and unit rules and expectations. The nursing students then discussed self esteem and had them do an activity to discuss their positive and negative aspects of the individual.     Notes:  Pt was an active participant in group therapy and identified their daily goal as, \"be more positive. \"    Status After Intervention:  Unchanged    Participation Level: Active Listener    Participation Quality: Attentive      Speech:  hesitant      Thought Process/Content: Perseverating      Affective Functioning: Blunted      Mood: anxious, depressed, and fearful      Level of consciousness:  Attentive      Response to Learning: Able to retain information      Endings: None Reported    Modes of Intervention: Education, Support, Socialization, Exploration, Clarifying, and Problem-solving      Discipline Responsible: /Counselor      Signature:   YNES Reddy LSW

## 2023-03-16 NOTE — H&P
Department of Psychiatry  History and Physical - Adult     CHIEF COMPLAINT: Psychotic    Patient was seen after discussing with the treatment team and reviewing the chart    CIRCUMSTANCES OF ADMISSION: presented to the ED sent in by crisis unit for manic behavior and auditory visual hallucinations    HISTORY OF PRESENT ILLNESS:      The patient is a 28 y.o. female with significant past history of paranoid schizophrenia, genital herpes and seizures presented to the ED sent in by crisis unit for manic behavior and auditory visual hallucinations in the ED her urine drug screen positive for cannabis her blood alcohol level is negative she was medically cleared mid to 7 S adult psychiatric unit for further psychiatric assessment stabilization and treatment    Upon evaluation today patient is clearly psychotic and internally stimulated. She admits to auditory hallucinations. She is paranoid guarded she is unable to answer questions she is unable to tell us last time she had any long-acting injection. When asked how she is recently been sleeping she states she has not slept in several days  But cannot tell me the last time that she has slept. Patient is acutely psychotic she is distressed by her hallucinations she is distressed by her intrusive thoughts she is been unable to sleep she appears disheveled she reportedly told nursing staff to give her medications that she can die. She reported to nursing staff that she went for medications a year ago because she wanted to get pregnant. She has a history of taking Suboxone per [place] she was last prescribed Suboxone 8/2 twice daily on 3/1/2023.   Her insight judgment is poor she is alert oriented time place and person she is psychotic and internally stimulated at this time          Past psychiatric history: Patient denies any history of any inpatient psychiatric hospitalization she states she was at [facility] in the past.  She denies any outpatient

## 2023-03-17 PROCEDURE — 6370000000 HC RX 637 (ALT 250 FOR IP): Performed by: PSYCHIATRY & NEUROLOGY

## 2023-03-17 PROCEDURE — 6370000000 HC RX 637 (ALT 250 FOR IP): Performed by: NURSE PRACTITIONER

## 2023-03-17 PROCEDURE — 99232 SBSQ HOSP IP/OBS MODERATE 35: CPT | Performed by: NURSE PRACTITIONER

## 2023-03-17 PROCEDURE — 1240000000 HC EMOTIONAL WELLNESS R&B

## 2023-03-17 PROCEDURE — 6360000002 HC RX W HCPCS: Performed by: NURSE PRACTITIONER

## 2023-03-17 RX ORDER — RISPERIDONE 0.5 MG/1
1 TABLET, ORALLY DISINTEGRATING ORAL DAILY
Status: DISCONTINUED | OUTPATIENT
Start: 2023-03-18 | End: 2023-03-18

## 2023-03-17 RX ORDER — OXCARBAZEPINE 300 MG/1
300 TABLET, FILM COATED ORAL 2 TIMES DAILY
Status: DISCONTINUED | OUTPATIENT
Start: 2023-03-17 | End: 2023-03-27 | Stop reason: HOSPADM

## 2023-03-17 RX ORDER — RISPERIDONE 2 MG/1
2 TABLET, ORALLY DISINTEGRATING ORAL NIGHTLY
Status: DISCONTINUED | OUTPATIENT
Start: 2023-03-17 | End: 2023-03-18

## 2023-03-17 RX ADMIN — OXCARBAZEPINE 150 MG: 150 TABLET, FILM COATED ORAL at 09:27

## 2023-03-17 RX ADMIN — BUPRENORPHINE AND NALOXONE 1 TABLET: 8; 2 TABLET SUBLINGUAL at 09:27

## 2023-03-17 RX ADMIN — NICOTINE POLACRILEX 4 MG: 2 GUM, CHEWING BUCCAL at 20:48

## 2023-03-17 RX ADMIN — RISPERIDONE 1 MG: 0.5 TABLET, ORALLY DISINTEGRATING ORAL at 09:27

## 2023-03-17 RX ADMIN — HYDROXYZINE PAMOATE 50 MG: 50 CAPSULE ORAL at 23:29

## 2023-03-17 RX ADMIN — NICOTINE POLACRILEX 4 MG: 2 GUM, CHEWING BUCCAL at 10:47

## 2023-03-17 RX ADMIN — OXCARBAZEPINE 300 MG: 300 TABLET, FILM COATED ORAL at 20:45

## 2023-03-17 RX ADMIN — BUPRENORPHINE AND NALOXONE 1 TABLET: 8; 2 TABLET SUBLINGUAL at 20:45

## 2023-03-17 RX ADMIN — RISPERIDONE 2 MG: 2 TABLET, ORALLY DISINTEGRATING ORAL at 20:45

## 2023-03-17 RX ADMIN — TRAZODONE HYDROCHLORIDE 50 MG: 50 TABLET ORAL at 20:45

## 2023-03-17 ASSESSMENT — PAIN SCALES - GENERAL
PAINLEVEL_OUTOF10: 0
PAINLEVEL_OUTOF10: 0

## 2023-03-17 NOTE — GROUP NOTE
Group Therapy Note    Date: 3/17/2023  Start Time: 1005  End Time:  1030  Number of Participants: 12    Type of Group: Psychoeducation    Wellness Binder Information  Module Name:  Acceptance and Control     Patient's Goal:  Nursing students presented group discussion of acceptance and control. Patients to ID what they have control over vs what they do not, and identify ways to learn to accept the things one can not control. Notes:  Patient interactive and attentive during group. Able to identify things one can not control and ways to help cope. Status After Intervention:  Improved    Participation Level:  Active Listener    Participation Quality: Appropriate      Speech:  normal      Thought Process/Content: Logical      Affective Functioning: Congruent      Mood: euthymic      Level of consciousness:  Alert and Attentive      Response to Learning: Able to verbalize current knowledge/experience and Able to verbalize/acknowledge new learning      Endings: None Reported    Modes of Intervention: Education      Discipline Responsible: Psychoeducational Specialist      Signature:  Aldo Bejarano, [address]

## 2023-03-17 NOTE — GROUP NOTE
Group Therapy Note    Date: 3/17/2023    Group Start Time: 1100  Group End Time: 1140  Group Topic: Cognitive Skills    [facility]  [address] YNES Hoffman, FRANCISW        Group Therapy Note    Attendees: 10       Patient's Goal:  Pt will be able to identify a safe discharge plan and where they would like to go when the leave the hospital, who they want to follow up with for outpatient services and goals for the future. Notes:  Pt participated in group and made connections with other group members.      Status After Intervention:  Unchanged    Participation Level: None    Participation Quality: Lethargic      Speech:  mute      Thought Process/Content: n/a      Affective Functioning: Constricted/Restricted      Mood: dysphoric      Level of consciousness:  Inattentive      Response to Learning: Resistant      Endings: None Reported    Modes of Intervention: Education, Support, Socialization, Exploration, Clarifying, Problem-solving, and Activity      Discipline Responsible: /Counselor      Signature:  YNES Jacob, Jesusita Neumann

## 2023-03-17 NOTE — GROUP NOTE
Group Therapy Note    Date: 3/17/2023  Start Time: 1400  End Time:  1500  Number of Participants: 9    Type of Group: Relaxation    Wellness Binder Information  Module Name:  Mindful Art- Hands    Patient's Goal:  Demonstrate one mindful art technique involving hands    Notes:  Patient engaged in mindful art technique involving marker and hands. Patients encouraged to focus on solely the present while music played in the background. Status After Intervention:  Improved    Participation Level:  Active Listener and Interactive    Participation Quality: Appropriate and Attentive      Speech:  normal      Thought Process/Content: Logical      Affective Functioning: Congruent      Mood: euthymic      Level of consciousness:  Alert and Attentive      Response to Learning: Able to verbalize current knowledge/experience and Able to verbalize/acknowledge new learning      Endings: None Reported    Modes of Intervention: Exploration and Activity      Discipline Responsible: Psychoeducational Specialist      Signature:  Panchito Ya, [address]

## 2023-03-18 PROCEDURE — 99231 SBSQ HOSP IP/OBS SF/LOW 25: CPT | Performed by: NURSE PRACTITIONER

## 2023-03-18 PROCEDURE — 6370000000 HC RX 637 (ALT 250 FOR IP): Performed by: NURSE PRACTITIONER

## 2023-03-18 PROCEDURE — 6360000002 HC RX W HCPCS: Performed by: NURSE PRACTITIONER

## 2023-03-18 PROCEDURE — 1240000000 HC EMOTIONAL WELLNESS R&B

## 2023-03-18 RX ORDER — RISPERIDONE 2 MG/1
2 TABLET, ORALLY DISINTEGRATING ORAL 2 TIMES DAILY
Status: DISCONTINUED | OUTPATIENT
Start: 2023-03-18 | End: 2023-03-20

## 2023-03-18 RX ORDER — NICOTINE 21 MG/24HR
1 PATCH, TRANSDERMAL 24 HOURS TRANSDERMAL DAILY
Status: DISCONTINUED | OUTPATIENT
Start: 2023-03-18 | End: 2023-03-27 | Stop reason: HOSPADM

## 2023-03-18 RX ADMIN — RISPERIDONE 2 MG: 2 TABLET, ORALLY DISINTEGRATING ORAL at 21:18

## 2023-03-18 RX ADMIN — OXCARBAZEPINE 300 MG: 300 TABLET, FILM COATED ORAL at 09:29

## 2023-03-18 RX ADMIN — HYDROXYZINE PAMOATE 50 MG: 50 CAPSULE ORAL at 18:34

## 2023-03-18 RX ADMIN — BUPRENORPHINE AND NALOXONE 1 TABLET: 8; 2 TABLET SUBLINGUAL at 09:29

## 2023-03-18 RX ADMIN — TRAZODONE HYDROCHLORIDE 50 MG: 50 TABLET ORAL at 22:53

## 2023-03-18 RX ADMIN — RISPERIDONE 1 MG: 0.5 TABLET, ORALLY DISINTEGRATING ORAL at 09:29

## 2023-03-18 RX ADMIN — OXCARBAZEPINE 300 MG: 300 TABLET, FILM COATED ORAL at 21:18

## 2023-03-18 RX ADMIN — BUPRENORPHINE AND NALOXONE 1 TABLET: 8; 2 TABLET SUBLINGUAL at 21:18

## 2023-03-18 ASSESSMENT — PAIN SCALES - GENERAL
PAINLEVEL_OUTOF10: 0

## 2023-03-18 NOTE — GROUP NOTE
Group Therapy Note    Date: 3/18/2023    Group Start Time: 1000  Group End Time: 4722  Group Topic: Psychoeducation    [facility]  [address] [facility]    Group Name: Dimensions of wellness                                                  Patient's Goal:  patient will be able to identify what dimensions of his/her life they can improve. Notes:  pleasant and sharing in group able to participate appropriate. Status After Intervention:  Improved    Participation Level:  Active Listener and Interactive    Participation Quality: Appropriate, Attentive, and Sharing      Speech:  normal      Thought Process/Content: Logical      Affective Functioning: Congruent      Mood: euthymic      Level of consciousness:  Alert, Oriented x4, and Attentive      Response to Learning: Able to verbalize/acknowledge new learning, Able to retain information, and Progressing to goal      Endings: None Reported    Modes of Intervention: Education, Support, Socialization, Exploration, and Problem-solving      Discipline Responsible: Psychoeducational Specialist      Signature:  Mary Hilton

## 2023-03-18 NOTE — GROUP NOTE
Group Therapy Note    Date: 3/18/2023    Group Start Time: 1105  Group End Time: 1140  Group Topic: Cognitive Skills    [facility]  [address], MSW, LSW        Group Therapy Note    Attendees: 14       Patient's Goal:  Pt will participate in discussion on boundaries and learn how to set boundaries. Notes:  Pt was an active participant in group discussion. Status After Intervention:  Unchanged    Participation Level: Active Listener and Minimal    Participation Quality: Appropriate and Attentive      Speech:  normal      Thought Process/Content: Linear      Affective Functioning: Blunted      Mood: anxious      Level of consciousness:  Alert, Oriented x4, and Attentive      Response to Learning: Resistant      Endings: None Reported    Modes of Intervention: Education, Support, Socialization, Exploration, Clarifying, and Problem-solving      Discipline Responsible: /Counselor      Signature:   YNES Love, Trina Momin

## 2023-03-19 PROCEDURE — 99231 SBSQ HOSP IP/OBS SF/LOW 25: CPT | Performed by: NURSE PRACTITIONER

## 2023-03-19 PROCEDURE — 6370000000 HC RX 637 (ALT 250 FOR IP): Performed by: NURSE PRACTITIONER

## 2023-03-19 PROCEDURE — 1240000000 HC EMOTIONAL WELLNESS R&B

## 2023-03-19 PROCEDURE — 6360000002 HC RX W HCPCS: Performed by: NURSE PRACTITIONER

## 2023-03-19 RX ADMIN — BUPRENORPHINE AND NALOXONE 1 TABLET: 8; 2 TABLET SUBLINGUAL at 09:20

## 2023-03-19 RX ADMIN — HYDROXYZINE PAMOATE 50 MG: 50 CAPSULE ORAL at 11:36

## 2023-03-19 RX ADMIN — RISPERIDONE 2 MG: 2 TABLET, ORALLY DISINTEGRATING ORAL at 09:20

## 2023-03-19 RX ADMIN — OXCARBAZEPINE 300 MG: 300 TABLET, FILM COATED ORAL at 09:20

## 2023-03-19 RX ADMIN — HYDROXYZINE PAMOATE 50 MG: 50 CAPSULE ORAL at 02:40

## 2023-03-19 RX ADMIN — HYDROXYZINE PAMOATE 50 MG: 50 CAPSULE ORAL at 18:16

## 2023-03-19 RX ADMIN — BUPRENORPHINE AND NALOXONE 1 TABLET: 8; 2 TABLET SUBLINGUAL at 21:17

## 2023-03-19 RX ADMIN — RISPERIDONE 2 MG: 2 TABLET, ORALLY DISINTEGRATING ORAL at 21:17

## 2023-03-19 RX ADMIN — TRAZODONE HYDROCHLORIDE 50 MG: 50 TABLET ORAL at 21:17

## 2023-03-19 RX ADMIN — OXCARBAZEPINE 300 MG: 300 TABLET, FILM COATED ORAL at 21:17

## 2023-03-19 NOTE — GROUP NOTE
Group Therapy Note    Date: 3/19/2023    Group Start Time: 1100  Group End Time: 3150  Group Topic: Cognitive Skills    [facility] 1    YNES Patel, [facility]        Group Therapy Note    Attendees: 12       Patient's Goal:  Pt will learn stress management tips as well as tips for self-care. Notes:  Pt was an active participant in group discussion. Status After Intervention:  Unchanged    Participation Level: Active Listener    Participation Quality: Appropriate      Speech:  normal      Thought Process/Content: Linear      Affective Functioning: Blunted      Mood: elevated      Level of consciousness:  Alert and Attentive      Response to Learning: Resistant      Endings: None Reported    Modes of Intervention: Education, Support, Socialization, Exploration, Clarifying, and Problem-solving      Discipline Responsible: /Counselor      Signature:   YNES Patel, [place]

## 2023-03-19 NOTE — GROUP NOTE
Date: 3/19/2023    Group Start Time: 1000  Group End Time: 3855  Group Topic: Psychoeducation    [facility]  [address], [address]                                                                         Group Therapy Note    Date: 3/19/2023    Module Name:  id of stressors/id of physical symptoms     Patient's Goal:  Patient will be able to id daily stressors and life events. Will identify one positive step to decrease daily stressors. Notes:  pleasant and sharing in group, able to participate appropriately. Status After Intervention:  Improved    Participation Level:  Active Listener and Interactive    Participation Quality: Appropriate, Attentive, and Sharing      Speech:  normal      Thought Process/Content: Logical  Linear      Affective Functioning: Congruent      Mood: euthymic      Level of consciousness:  Alert, Oriented x4, and Attentive      Response to Learning: Able to verbalize/acknowledge new learning, Able to retain information, and Progressing to goal      Endings: None Reported    Modes of Intervention: Education, Support, Socialization, and Exploration      Discipline Responsible: Psychoeducational Specialist      Signature:  Herson Matos

## 2023-03-20 PROCEDURE — 6370000000 HC RX 637 (ALT 250 FOR IP): Performed by: NURSE PRACTITIONER

## 2023-03-20 PROCEDURE — 99232 SBSQ HOSP IP/OBS MODERATE 35: CPT | Performed by: NURSE PRACTITIONER

## 2023-03-20 PROCEDURE — 6360000002 HC RX W HCPCS: Performed by: NURSE PRACTITIONER

## 2023-03-20 PROCEDURE — 1240000000 HC EMOTIONAL WELLNESS R&B

## 2023-03-20 RX ORDER — LANOLIN ALCOHOL/MO/W.PET/CERES
3 CREAM (GRAM) TOPICAL DAILY
Status: DISCONTINUED | OUTPATIENT
Start: 2023-03-20 | End: 2023-03-27 | Stop reason: HOSPADM

## 2023-03-20 RX ORDER — RISPERIDONE 2 MG/1
2 TABLET, ORALLY DISINTEGRATING ORAL 2 TIMES DAILY
Status: COMPLETED | OUTPATIENT
Start: 2023-03-20 | End: 2023-03-20

## 2023-03-20 RX ADMIN — RISPERIDONE 2 MG: 2 TABLET, ORALLY DISINTEGRATING ORAL at 09:09

## 2023-03-20 RX ADMIN — OXCARBAZEPINE 300 MG: 300 TABLET, FILM COATED ORAL at 09:09

## 2023-03-20 RX ADMIN — HYDROXYZINE PAMOATE 50 MG: 50 CAPSULE ORAL at 01:02

## 2023-03-20 RX ADMIN — HYDROXYZINE PAMOATE 50 MG: 50 CAPSULE ORAL at 15:16

## 2023-03-20 RX ADMIN — MELATONIN 3 MG ORAL TABLET 3 MG: 3 TABLET ORAL at 18:29

## 2023-03-20 RX ADMIN — BUPRENORPHINE AND NALOXONE 1 TABLET: 8; 2 TABLET SUBLINGUAL at 09:09

## 2023-03-20 RX ADMIN — RISPERIDONE 2 MG: 2 TABLET, ORALLY DISINTEGRATING ORAL at 21:39

## 2023-03-20 RX ADMIN — BUPRENORPHINE AND NALOXONE 1 TABLET: 8; 2 TABLET SUBLINGUAL at 21:39

## 2023-03-20 RX ADMIN — OXCARBAZEPINE 300 MG: 300 TABLET, FILM COATED ORAL at 21:39

## 2023-03-20 NOTE — GROUP NOTE
Group Therapy Note    Date: 3/20/2023  Start Time: 1010  End Time:  1050  Number of Participants: 12    Type of Group: Psychoeducation    Wellness Binder Information  Module Name:  Mantras  Patient's Goal:  ID what mantras are, and ID a specific mantra for one's self. Demonstrate knowledge of discussion through hands on activity. Notes:  Patient engaged in discussion of mantras and was able to ID a personal mantra for themselves. Patient engaged in activity of mantra art.       Status After Intervention:  Unchanged    Participation Level: Minimal    Participation Quality: Lethargic      Speech:  hesitant      Thought Process/Content: Logical      Affective Functioning: Flat and Constricted/Restricted      Mood: tired lethargic      Level of consciousness:  Drowsy      Response to Learning: Able to verbalize current knowledge/experience      Endings: None Reported    Modes of Intervention: Education, Support, and Exploration      Discipline Responsible: Psychoeducational Specialist      Signature:  Dany Viramontes, [address]

## 2023-03-20 NOTE — GROUP NOTE
Group Therapy Note    Date: 3/20/2023    Group Start Time: 1100  Group End Time: 1140  Group Topic: Psychotherapy    [facility]    Thankful YNES Cody, SUSAN        Group Therapy Note    Attendees: 8       Patient's Goal:  To increase social interaction and improve relationships with others. Notes:  Pt was attentive in group and was able to identify an agenda. They were also able to verbalize relating to others within the group. Status After Intervention:  Unchanged    Participation Level: Active Listener and Interactive    Participation Quality: Attentive, Sharing, and Lethargic      Speech:  hesitant      Thought Process/Content: Logical      Affective Functioning: Congruent      Mood: euthymic      Level of consciousness:  Attentive and Drowsy      Response to Learning: Able to verbalize current knowledge/experience and Able to retain information      Endings: None Reported    Modes of Intervention: Education, Support, Socialization, Exploration, Clarifying, and Problem-solving      Discipline Responsible: /Counselor      Signature:   YNES Galvez, SUSAN

## 2023-03-21 PROCEDURE — 99232 SBSQ HOSP IP/OBS MODERATE 35: CPT | Performed by: NURSE PRACTITIONER

## 2023-03-21 PROCEDURE — 6370000000 HC RX 637 (ALT 250 FOR IP): Performed by: NURSE PRACTITIONER

## 2023-03-21 PROCEDURE — 6360000002 HC RX W HCPCS: Performed by: NURSE PRACTITIONER

## 2023-03-21 PROCEDURE — 1240000000 HC EMOTIONAL WELLNESS R&B

## 2023-03-21 RX ADMIN — BUPRENORPHINE AND NALOXONE 1 TABLET: 8; 2 TABLET SUBLINGUAL at 21:35

## 2023-03-21 RX ADMIN — MELATONIN 3 MG ORAL TABLET 3 MG: 3 TABLET ORAL at 17:45

## 2023-03-21 RX ADMIN — OXCARBAZEPINE 300 MG: 300 TABLET, FILM COATED ORAL at 09:01

## 2023-03-21 RX ADMIN — HYDROXYZINE PAMOATE 50 MG: 50 CAPSULE ORAL at 16:05

## 2023-03-21 RX ADMIN — BUPRENORPHINE AND NALOXONE 1 TABLET: 8; 2 TABLET SUBLINGUAL at 09:01

## 2023-03-21 RX ADMIN — OXCARBAZEPINE 300 MG: 300 TABLET, FILM COATED ORAL at 21:35

## 2023-03-21 ASSESSMENT — PAIN SCALES - GENERAL
PAINLEVEL_OUTOF10: 0
PAINLEVEL_OUTOF10: 0

## 2023-03-21 NOTE — GROUP NOTE
Date: 3/21/2023    Group Start Time: 1010  Group End Time: 1055  Group Topic: Psychoeducation    [facility]    TORO De La Garza                                                                        Group Therapy Note    Date: 3/21/2023  Group Topic:   A healthy schedule   Patient's Goal:  Patient will be able to id torres steps to institute and healthy routine.     Notes:  Pleasant and sharing in group, willing to accept handout. Observed as active listening thru group.     Status After Intervention:  Improved    Participation Level: Active Listener and Interactive    Participation Quality: Appropriate, Attentive, and Sharing      Speech:  normal      Thought Process/Content: Logical      Affective Functioning: Congruent      Mood: euthymic      Level of consciousness:  Alert and Oriented x4      Response to Learning: Able to verbalize/acknowledge new learning, Able to retain information, and Progressing to goal      Endings: None Reported    Modes of Intervention: Education, Support, Socialization, and Exploration      Discipline Responsible: Psychoeducational Specialist      Signature:  TORO De La Garza

## 2023-03-21 NOTE — GROUP NOTE
Date: 3/21/2023    Group Start Time: 1410  Group End Time: 1500  Group Topic: Recovery    [facility]  [address]                                                                        Group Therapy Note      Number of Participants: 11  Type of Group: Recovery  Notes:  Patient observed as actively listening, engaged in group. Status After Intervention:  Improved  Participation Level: Active Listener  Participation Quality: Appropriate and Attentive  Speech:  normal  Thought Process/Content: Logical  Affective Functioning: Congruent  Response to Learning: Able to verbalize/acknowledge new learning and Able to retain information  Endings: None Reported  Modes of Intervention: Education, Support, and Socialization  Peer Recovery Counselor conducted group.    Signature:  Yvonne Park

## 2023-03-22 PROCEDURE — 6370000000 HC RX 637 (ALT 250 FOR IP): Performed by: NURSE PRACTITIONER

## 2023-03-22 PROCEDURE — 99232 SBSQ HOSP IP/OBS MODERATE 35: CPT | Performed by: NURSE PRACTITIONER

## 2023-03-22 PROCEDURE — 6360000002 HC RX W HCPCS: Performed by: NURSE PRACTITIONER

## 2023-03-22 PROCEDURE — 1240000000 HC EMOTIONAL WELLNESS R&B

## 2023-03-22 PROCEDURE — 6360000002 HC RX W HCPCS

## 2023-03-22 RX ORDER — OLANZAPINE 5 MG/1
10 TABLET, ORALLY DISINTEGRATING ORAL 2 TIMES DAILY
Status: DISCONTINUED | OUTPATIENT
Start: 2023-03-22 | End: 2023-03-23

## 2023-03-22 RX ORDER — DIPHENHYDRAMINE HYDROCHLORIDE 50 MG/ML
INJECTION INTRAMUSCULAR; INTRAVENOUS
Status: COMPLETED
Start: 2023-03-22 | End: 2023-03-22

## 2023-03-22 RX ORDER — DIPHENHYDRAMINE HYDROCHLORIDE 50 MG/ML
50 INJECTION INTRAMUSCULAR; INTRAVENOUS
Status: COMPLETED | OUTPATIENT
Start: 2023-03-22 | End: 2023-03-22

## 2023-03-22 RX ORDER — BENZTROPINE MESYLATE 1 MG/1
1 TABLET ORAL 2 TIMES DAILY
Status: DISCONTINUED | OUTPATIENT
Start: 2023-03-22 | End: 2023-03-27 | Stop reason: HOSPADM

## 2023-03-22 RX ORDER — DIPHENHYDRAMINE HYDROCHLORIDE 50 MG/ML
50 INJECTION INTRAMUSCULAR; INTRAVENOUS EVERY 6 HOURS PRN
Status: DISCONTINUED | OUTPATIENT
Start: 2023-03-22 | End: 2023-03-27 | Stop reason: HOSPADM

## 2023-03-22 RX ORDER — DIPHENHYDRAMINE HYDROCHLORIDE 50 MG/ML
50 INJECTION INTRAMUSCULAR; INTRAVENOUS EVERY 6 HOURS PRN
Status: DISCONTINUED | OUTPATIENT
Start: 2023-03-22 | End: 2023-03-22

## 2023-03-22 RX ADMIN — HYDROXYZINE PAMOATE 50 MG: 50 CAPSULE ORAL at 00:29

## 2023-03-22 RX ADMIN — HALOPERIDOL LACTATE 5 MG: 5 INJECTION, SOLUTION INTRAMUSCULAR at 03:11

## 2023-03-22 RX ADMIN — DIPHENHYDRAMINE HYDROCHLORIDE 50 MG: 50 INJECTION INTRAMUSCULAR; INTRAVENOUS at 03:11

## 2023-03-22 RX ADMIN — OXCARBAZEPINE 300 MG: 300 TABLET, FILM COATED ORAL at 08:53

## 2023-03-22 RX ADMIN — DIPHENHYDRAMINE HYDROCHLORIDE 50 MG: 50 INJECTION, SOLUTION INTRAMUSCULAR; INTRAVENOUS at 03:11

## 2023-03-22 RX ADMIN — OLANZAPINE 10 MG: 5 TABLET, ORALLY DISINTEGRATING ORAL at 21:14

## 2023-03-22 RX ADMIN — BUPRENORPHINE AND NALOXONE 1 TABLET: 8; 2 TABLET SUBLINGUAL at 08:53

## 2023-03-22 RX ADMIN — BENZTROPINE MESYLATE 1 MG: 1 TABLET ORAL at 21:14

## 2023-03-22 RX ADMIN — OLANZAPINE 10 MG: 5 TABLET, ORALLY DISINTEGRATING ORAL at 12:02

## 2023-03-22 RX ADMIN — MELATONIN 3 MG ORAL TABLET 3 MG: 3 TABLET ORAL at 21:14

## 2023-03-22 RX ADMIN — BENZTROPINE MESYLATE 1 MG: 1 TABLET ORAL at 09:20

## 2023-03-22 RX ADMIN — BUPRENORPHINE AND NALOXONE 1 TABLET: 8; 2 TABLET SUBLINGUAL at 21:14

## 2023-03-22 RX ADMIN — OXCARBAZEPINE 300 MG: 300 TABLET, FILM COATED ORAL at 21:14

## 2023-03-22 RX ADMIN — ACETAMINOPHEN 650 MG: 325 TABLET ORAL at 08:53

## 2023-03-22 ASSESSMENT — PAIN DESCRIPTION - LOCATION: LOCATION: FOOT

## 2023-03-22 ASSESSMENT — PAIN DESCRIPTION - DESCRIPTORS: DESCRIPTORS: ACHING

## 2023-03-22 ASSESSMENT — PAIN SCALES - GENERAL
PAINLEVEL_OUTOF10: 5
PAINLEVEL_OUTOF10: 0

## 2023-03-22 ASSESSMENT — PAIN DESCRIPTION - ORIENTATION: ORIENTATION: LEFT

## 2023-03-22 NOTE — GROUP NOTE
Group Therapy Note    Date: 3/22/2023    Group Start Time: 1115  Group End Time: 1150  Group Topic: Psychotherapy    [facility]  [address] [place]        Group Therapy Note    Attendees: 4       Patient's Goal:  To increase socialization and improve interpersonal relationships. Notes:  Pt minimally participated in group therapy. Status After Intervention:  Improved    Participation Level: Active Listener    Participation Quality: Appropriate, Attentive, Sharing, and Supportive      Speech:  normal      Thought Process/Content: Linear      Affective Functioning: Blunted      Mood: depressed      Level of consciousness:  Alert and Attentive      Response to Learning: Resistant      Endings: None Reported    Modes of Intervention: Support, Socialization, and Exploration      Discipline Responsible: /Counselor      Signature:   YNES Arce [place]

## 2023-03-22 NOTE — GROUP NOTE
Date: 3/22/2023    Group Start Time: 1005  Group End Time: 0740  Group Topic: Psychoeducation    [facility]  [address], [address]                                                                         Group Therapy Note    Date: 3/22/2023  Module Name:  supporting others     Patient's Goal: Patient will be able to share ways to communicate with others how they can help. Notes:  Pleasant and engaged in group, able to share when prompted. Status After Intervention:  Improved    Participation Level:  Active Listener and Interactive    Participation Quality: Appropriate, Attentive, and Sharing      Speech:  normal      Thought Process/Content: Logical      Affective Functioning: Congruent      Mood: euthymic      Level of consciousness:  Alert, Oriented x4, and Attentive      Response to Learning: Able to verbalize/acknowledge new learning, Able to retain information, and Progressing to goal      Endings: None Reported    Modes of Intervention: Education, Support, and Socialization      Discipline Responsible: Psychoeducational Specialist      Signature:  Yasmine Cote

## 2023-03-23 PROCEDURE — 6370000000 HC RX 637 (ALT 250 FOR IP): Performed by: NURSE PRACTITIONER

## 2023-03-23 PROCEDURE — 1240000000 HC EMOTIONAL WELLNESS R&B

## 2023-03-23 PROCEDURE — 99232 SBSQ HOSP IP/OBS MODERATE 35: CPT | Performed by: NURSE PRACTITIONER

## 2023-03-23 PROCEDURE — 6360000002 HC RX W HCPCS: Performed by: NURSE PRACTITIONER

## 2023-03-23 RX ORDER — OLANZAPINE 5 MG/1
10 TABLET, ORALLY DISINTEGRATING ORAL NIGHTLY
Status: DISCONTINUED | OUTPATIENT
Start: 2023-03-24 | End: 2023-03-27 | Stop reason: HOSPADM

## 2023-03-23 RX ADMIN — OLANZAPINE 10 MG: 5 TABLET, ORALLY DISINTEGRATING ORAL at 08:36

## 2023-03-23 RX ADMIN — HYDROXYZINE PAMOATE 50 MG: 50 CAPSULE ORAL at 16:28

## 2023-03-23 RX ADMIN — MELATONIN 3 MG ORAL TABLET 3 MG: 3 TABLET ORAL at 21:15

## 2023-03-23 RX ADMIN — BUPRENORPHINE AND NALOXONE 1 TABLET: 8; 2 TABLET SUBLINGUAL at 21:15

## 2023-03-23 RX ADMIN — BENZTROPINE MESYLATE 1 MG: 1 TABLET ORAL at 08:37

## 2023-03-23 RX ADMIN — OXCARBAZEPINE 300 MG: 300 TABLET, FILM COATED ORAL at 08:37

## 2023-03-23 RX ADMIN — BUPRENORPHINE AND NALOXONE 1 TABLET: 8; 2 TABLET SUBLINGUAL at 08:36

## 2023-03-23 RX ADMIN — OXCARBAZEPINE 300 MG: 300 TABLET, FILM COATED ORAL at 21:15

## 2023-03-23 RX ADMIN — BENZTROPINE MESYLATE 1 MG: 1 TABLET ORAL at 21:15

## 2023-03-23 ASSESSMENT — PAIN SCALES - GENERAL: PAINLEVEL_OUTOF10: 0

## 2023-03-23 NOTE — GROUP NOTE
Group Therapy Note    Date: 3/23/2023  Start Time: 4633  End Time:  1472  Number of Participants: 13    Type of Group: Psychoeducation    Wellness Binder Information  Module Name:  Coping Skills  Patient's Goal:  ID positive coping mechanisms to utilize in certain situations. Notes:  Nursing students presented activity of Coping Neoma Marjorie, and discussion of different types of coping mechanisms. Patients were provided with a handout regarding coping and actively engaged in both Neoma Marjorie and discussion. Status After Intervention:  Improved    Participation Level:  Active Listener    Participation Quality: Attentive and Lethargic      Speech:  normal, soft spoken      Thought Process/Content: Logical      Affective Functioning: Flat      Mood: cooperative      Level of consciousness:  Attentive and Drowsy      Response to Learning: Able to verbalize current knowledge/experience and Able to verbalize/acknowledge new learning      Endings: None Reported    Modes of Intervention: Education, Support, and Socialization      Discipline Responsible: Psychoeducational Specialist      Signature:  Cheryl Giordano, [address]

## 2023-03-24 PROCEDURE — 6370000000 HC RX 637 (ALT 250 FOR IP): Performed by: NURSE PRACTITIONER

## 2023-03-24 PROCEDURE — 6360000002 HC RX W HCPCS: Performed by: NURSE PRACTITIONER

## 2023-03-24 PROCEDURE — 1240000000 HC EMOTIONAL WELLNESS R&B

## 2023-03-24 PROCEDURE — 99232 SBSQ HOSP IP/OBS MODERATE 35: CPT | Performed by: NURSE PRACTITIONER

## 2023-03-24 RX ADMIN — OLANZAPINE 10 MG: 5 TABLET, ORALLY DISINTEGRATING ORAL at 21:29

## 2023-03-24 RX ADMIN — OXCARBAZEPINE 300 MG: 300 TABLET, FILM COATED ORAL at 09:13

## 2023-03-24 RX ADMIN — BUPRENORPHINE AND NALOXONE 1 TABLET: 8; 2 TABLET SUBLINGUAL at 09:13

## 2023-03-24 RX ADMIN — HYDROXYZINE PAMOATE 50 MG: 50 CAPSULE ORAL at 17:23

## 2023-03-24 RX ADMIN — MELATONIN 3 MG ORAL TABLET 3 MG: 3 TABLET ORAL at 18:17

## 2023-03-24 RX ADMIN — BENZTROPINE MESYLATE 1 MG: 1 TABLET ORAL at 21:29

## 2023-03-24 RX ADMIN — OXCARBAZEPINE 300 MG: 300 TABLET, FILM COATED ORAL at 21:29

## 2023-03-24 RX ADMIN — BENZTROPINE MESYLATE 1 MG: 1 TABLET ORAL at 09:13

## 2023-03-24 RX ADMIN — BUPRENORPHINE AND NALOXONE 1 TABLET: 8; 2 TABLET SUBLINGUAL at 21:29

## 2023-03-24 ASSESSMENT — PAIN SCALES - GENERAL
PAINLEVEL_OUTOF10: 0
PAINLEVEL_OUTOF10: 0

## 2023-03-24 NOTE — GROUP NOTE
Group Therapy Note    Date: 3/24/2023    Group Start Time: 1110  Group End Time: 6644  Group Topic: Psychotherapy    [facility]  [address], MSW, LSW        Group Therapy Note    Attendees: 7       Patient's Goal:  To increase socialization and improve interpersonal relationships. Notes:  Pt was an active participant in group discussion. Status After Intervention:  Unchanged    Participation Level: Active Listener and None    Participation Quality: Appropriate      Speech:  mute      Thought Process/Content: paranoid      Affective Functioning: Blunted      Mood: elevated      Level of consciousness:  Alert and preoccupied      Response to Learning: Resistant      Endings: None Reported    Modes of Intervention: Support, Socialization, and Exploration      Discipline Responsible: /Counselor      Signature:   YNES Benjamin, [facility]

## 2023-03-25 PROCEDURE — 6360000002 HC RX W HCPCS: Performed by: NURSE PRACTITIONER

## 2023-03-25 PROCEDURE — 6370000000 HC RX 637 (ALT 250 FOR IP): Performed by: NURSE PRACTITIONER

## 2023-03-25 PROCEDURE — 1240000000 HC EMOTIONAL WELLNESS R&B

## 2023-03-25 PROCEDURE — 99232 SBSQ HOSP IP/OBS MODERATE 35: CPT | Performed by: NURSE PRACTITIONER

## 2023-03-25 RX ADMIN — BUPRENORPHINE AND NALOXONE 1 TABLET: 8; 2 TABLET SUBLINGUAL at 20:26

## 2023-03-25 RX ADMIN — OXCARBAZEPINE 300 MG: 300 TABLET, FILM COATED ORAL at 20:26

## 2023-03-25 RX ADMIN — BENZTROPINE MESYLATE 1 MG: 1 TABLET ORAL at 20:26

## 2023-03-25 RX ADMIN — BENZTROPINE MESYLATE 1 MG: 1 TABLET ORAL at 08:39

## 2023-03-25 RX ADMIN — BUPRENORPHINE AND NALOXONE 1 TABLET: 8; 2 TABLET SUBLINGUAL at 08:39

## 2023-03-25 RX ADMIN — OLANZAPINE 10 MG: 5 TABLET, ORALLY DISINTEGRATING ORAL at 20:25

## 2023-03-25 RX ADMIN — OXCARBAZEPINE 300 MG: 300 TABLET, FILM COATED ORAL at 08:39

## 2023-03-25 RX ADMIN — MELATONIN 3 MG ORAL TABLET 3 MG: 3 TABLET ORAL at 20:25

## 2023-03-25 ASSESSMENT — PAIN SCALES - GENERAL: PAINLEVEL_OUTOF10: 0

## 2023-03-25 NOTE — GROUP NOTE
Group Therapy Note    Date: 3/25/2023    Group Start Time: 1110  Group End Time: 1150  Group Topic: Cognitive Skills    [facility]    YNES Rangel, SUSAN        Group Therapy Note    Attendees: 14       Patient's Goal:  Learning the concepts of Radical Acceptance and how to practice it    Notes:  pt was attentive during group    Status After Intervention:  Improved    Participation Level:  Active Listener and Interactive    Participation Quality: Appropriate and Attentive      Speech:  hesitant      Thought Process/Content: Logical      Affective Functioning: Congruent      Mood: depressed      Level of consciousness:  Alert and Attentive      Response to Learning: Able to retain information      Endings: None Reported    Modes of Intervention: Education, Support, Socialization, Exploration, Clarifying, and Problem-solving      Discipline Responsible: /Counselor      Signature:  YNES Rangel, SUSAN

## 2023-03-25 NOTE — GROUP NOTE
Group Therapy Note    Date: 3/25/2023    Group Start Time: 5684  Group End Time: 1100  Group Topic: Education Group - Inpatient    [facility] [address], CTRS; [place], [place]                                                                        Group Therapy Note    Date: 3/25/2023  Start Time: 6759  End Time:  11  Number of Participants: 13    Type of Group: Psychoeducation    Module Name: Self-esteem/self-love      Patient's Goal: Define self-love/self-esteem. ID ways to improve self-love/self-esteem, and demonstrate use through self-esteem mad libs. Notes: Patient actively engaged in discussion of self-love and maintained attentive behavior throughout group    Status After Intervention:  Improved    Participation Level:  Active Listener and Interactive    Participation Quality: Appropriate, Attentive, and Sharing      Speech:  normal      Thought Process/Content: Logical  Linear      Affective Functioning: Congruent      Mood: Calm and cooperative      Level of consciousness:  Alert, Oriented x4, and Attentive      Response to Learning: Able to verbalize current knowledge/experience, Able to verbalize/acknowledge new learning, Able to retain information, Capable of insight, Able to change behavior, and Progressing to goal      Endings: None Reported    Modes of Intervention: Education      Discipline Responsible: Psychoeducational Specialist      Signature:  TORO Kaiser     Group Therapy Note    Attendees: 15

## 2023-03-26 PROCEDURE — 1240000000 HC EMOTIONAL WELLNESS R&B

## 2023-03-26 PROCEDURE — 6370000000 HC RX 637 (ALT 250 FOR IP): Performed by: NURSE PRACTITIONER

## 2023-03-26 PROCEDURE — 6360000002 HC RX W HCPCS: Performed by: NURSE PRACTITIONER

## 2023-03-26 PROCEDURE — 99232 SBSQ HOSP IP/OBS MODERATE 35: CPT | Performed by: NURSE PRACTITIONER

## 2023-03-26 RX ADMIN — MELATONIN 3 MG ORAL TABLET 3 MG: 3 TABLET ORAL at 18:08

## 2023-03-26 RX ADMIN — OXCARBAZEPINE 300 MG: 300 TABLET, FILM COATED ORAL at 20:54

## 2023-03-26 RX ADMIN — BUPRENORPHINE AND NALOXONE 1 TABLET: 8; 2 TABLET SUBLINGUAL at 09:22

## 2023-03-26 RX ADMIN — BUPRENORPHINE AND NALOXONE 1 TABLET: 8; 2 TABLET SUBLINGUAL at 20:54

## 2023-03-26 RX ADMIN — BENZTROPINE MESYLATE 1 MG: 1 TABLET ORAL at 09:22

## 2023-03-26 RX ADMIN — OLANZAPINE 10 MG: 5 TABLET, ORALLY DISINTEGRATING ORAL at 20:54

## 2023-03-26 RX ADMIN — OXCARBAZEPINE 300 MG: 300 TABLET, FILM COATED ORAL at 09:22

## 2023-03-26 RX ADMIN — BENZTROPINE MESYLATE 1 MG: 1 TABLET ORAL at 20:54

## 2023-03-26 NOTE — GROUP NOTE
Group Therapy Note    Date: 3/26/2023    Group Start Time: 1430  Group End Time: 5210  Group Topic: Cognitive Skills    [facility]    Thankful YNES Cody, SUSAN        Group Therapy Note    Attendees: 5       Patient's Goal:  Pt attended group where we listened to requested music and discussed the benefits of music and therapeutic relationship we have with it. Notes:  Pt was an active participant in group therapy. Pt made up her own moves to a song she requested to hear. Status After Intervention:  Unchanged    Participation Level: Active Listener    Participation Quality: Appropriate and Attentive      Speech:  normal      Thought Process/Content: Logical      Affective Functioning: Congruent      Mood: euthymic      Level of consciousness:  Alert and Attentive      Response to Learning: Able to verbalize current knowledge/experience and Able to retain information      Endings: None Reported    Modes of Intervention: Education, Support, Socialization, Exploration, Clarifying, and Problem-solving      Discipline Responsible: /Counselor      Signature:   YNES Almanzar, SUSAN

## 2023-03-26 NOTE — GROUP NOTE
Group Therapy Note    Date: 3/26/2023    Group Start Time: 0930  Group End Time: 56  Group Topic: Cognitive Skills    [facility]    Thankful YNES Cody, SUSAN        Group Therapy Note    Attendees: 14       Patient's Goal:  Pt attended community support group. We also discussed trauma - the symptoms of and different treatment modalities available. Notes:  Pt was an active participant in group therapy. They identified their daily goal as, \"to have a good day. \"    Status After Intervention:  Unchanged    Participation Level: Active Listener    Participation Quality: Lethargic      Speech:  hesitant      Thought Process/Content: Perseverating      Affective Functioning: Flat      Mood: euthymic      Level of consciousness:  Drowsy      Response to Learning: Able to retain information      Endings: None Reported    Modes of Intervention: Education, Support, Socialization, Exploration, Clarifying, and Problem-solving      Discipline Responsible: /Counselor      Signature:   YNES Maldonado, SUSAN

## 2023-03-26 NOTE — GROUP NOTE
Group Therapy Note    Date: 3/26/2023    Group Start Time: 1100  Group End Time: 1150  Group Topic: Cognitive Skills    [facility]    YNES Denis LSW        Group Therapy Note    Attendees: 13         Patient's Goal:  Recognizing feelings of anger and frustration and how to identify the cause and how to cope. Notes:  pt was attentive and verbal during group. Able to provide examples and participate in group discussion    Status After Intervention:  Improved    Participation Level:  Active Listener and Interactive    Participation Quality: Appropriate, Attentive, and Sharing      Speech:  normal      Thought Process/Content: Logical      Affective Functioning: Congruent      Mood: depressed      Level of consciousness:  Alert, Oriented x4, and Attentive      Response to Learning: Able to verbalize current knowledge/experience, Able to verbalize/acknowledge new learning, and Able to retain information      Endings: None Reported    Modes of Intervention: Education, Support, Socialization, Exploration, Clarifying, and Problem-solving      Discipline Responsible: /Counselor      Signature:  YNES Denis LSW

## 2023-03-27 VITALS
RESPIRATION RATE: 16 BRPM | DIASTOLIC BLOOD PRESSURE: 66 MMHG | TEMPERATURE: 97.3 F | WEIGHT: 150 LBS | SYSTOLIC BLOOD PRESSURE: 104 MMHG | HEIGHT: 64 IN | BODY MASS INDEX: 25.61 KG/M2 | OXYGEN SATURATION: 97 % | HEART RATE: 101 BPM

## 2023-03-27 PROCEDURE — 99239 HOSP IP/OBS DSCHRG MGMT >30: CPT | Performed by: NURSE PRACTITIONER

## 2023-03-27 PROCEDURE — 6370000000 HC RX 637 (ALT 250 FOR IP): Performed by: NURSE PRACTITIONER

## 2023-03-27 PROCEDURE — 6360000002 HC RX W HCPCS: Performed by: NURSE PRACTITIONER

## 2023-03-27 RX ORDER — BENZTROPINE MESYLATE 1 MG/1
1 TABLET ORAL 2 TIMES DAILY
Qty: 60 TABLET | Refills: 0 | Status: SHIPPED | OUTPATIENT
Start: 2023-03-27 | End: 2023-04-26

## 2023-03-27 RX ORDER — OXCARBAZEPINE 300 MG/1
300 TABLET, FILM COATED ORAL 2 TIMES DAILY
Qty: 60 TABLET | Refills: 0 | Status: SHIPPED | OUTPATIENT
Start: 2023-03-27 | End: 2023-04-26

## 2023-03-27 RX ORDER — LANOLIN ALCOHOL/MO/W.PET/CERES
3 CREAM (GRAM) TOPICAL DAILY
Qty: 30 TABLET | Refills: 0 | Status: SHIPPED | OUTPATIENT
Start: 2023-03-27 | End: 2023-04-26

## 2023-03-27 RX ORDER — OLANZAPINE 10 MG/1
10 TABLET, ORALLY DISINTEGRATING ORAL NIGHTLY
Qty: 30 TABLET | Refills: 0 | Status: SHIPPED | OUTPATIENT
Start: 2023-03-27 | End: 2023-04-26

## 2023-03-27 RX ADMIN — BUPRENORPHINE AND NALOXONE 1 TABLET: 8; 2 TABLET SUBLINGUAL at 08:59

## 2023-03-27 RX ADMIN — BENZTROPINE MESYLATE 1 MG: 1 TABLET ORAL at 08:59

## 2023-03-27 RX ADMIN — OXCARBAZEPINE 300 MG: 300 TABLET, FILM COATED ORAL at 08:59

## 2023-03-27 ASSESSMENT — PAIN SCALES - GENERAL: PAINLEVEL_OUTOF10: 0

## 2023-03-27 NOTE — PROGRESS NOTES
Amy Agustin asleep at this time medication effective slept poorly this shift
BEHAVIORAL HEALTH FOLLOW-UP NOTE     3/19/2023     Patient was seen and examined in person, Chart reviewed   Patient's case discussed with staff/team    Chief Complaint: \"The voices are more like noise now\"    Interim History:   Patient seen up on the unit. Still appears preoccupied she states she still hearing voices but they are getting better and now more like noise. she denies any paranoia she denies suicidal homicidal ideations intent or plan she appears more relaxed she is out visible in the milieu she is attending groups socializing with peers. There is no behavioral disturbances. She remains paranoid. She is taking her medications, she has been attending some groups. She is requesting the long acting injection.        Appetite: [x] Normal/Unchanged  [] Increased  [] Decreased      Sleep:       [x] Normal/Unchanged  [] Fair       [] Poor              Energy:    [x] Normal/Unchanged  [] Increased  [] Decreased        SI [] Present  [x] Absent    HI  []Present  [x] Absent     Aggression:  [] yes  [x] no    Patient is [x] able  [] unable to CONTRACT FOR SAFETY     PAST MEDICAL/PSYCHIATRIC HISTORY:   Past Medical History:   Diagnosis Date    Concussion with loss of consciousness of 30 minutes or less 2018    Herpes genitalia     Paranoid schizophrenia ([facility] [address]     Seizures ([facility] [address]     Ulcer        FAMILY/SOCIAL HISTORY:  Family History   Problem Relation Age of Onset    Diabetes Mother     Diabetes Father     Other Brother     Hypertension Maternal Grandmother      Social History     Socioeconomic History    Marital status: Single     Spouse name: Not on file    Number of children: 1    Years of education: 10    Highest education level: 10th grade   Occupational History    Not on file   Tobacco Use    Smoking status: Former     Types: Cigarettes     Quit date: 2022     Years since quittin.1    Smokeless tobacco: Never    Tobacco comments:     Started smoking again after mom passed away     Vaping
BEHAVIORAL HEALTH FOLLOW-UP NOTE     3/22/2023     Patient was seen and examined in person, Chart reviewed   Patient's case discussed with staff/team    Chief Complaint: Psychotic    Interim History:   Patient seen upon the unit she is clearly internally stimulated and paranoid. She required stat IM injections last night asking to go to the ER to see her daughter believing that her daughter was downstairs believing there was shooting downstairs patient is intrusive stopping the multiple times in the unit she visibly is paranoid and fearful. She says that she was having a nightmare while she was awake. She believes that there was a shooting she heard the shots downstairs but states nobody else hurt him. She claims that she asked for the shots last night because of her agitation.   She has limited insight and judgment internally stimulated paranoid and psychotic      Appetite: [x] Normal/Unchanged  [] Increased  [] Decreased      Sleep:       [x] Normal/Unchanged  [] Fair       [] Poor              Energy:    [x] Normal/Unchanged  [] Increased  [] Decreased        SI [] Present  [x] Absent    HI  []Present  [x] Absent     Aggression:  [] yes  [x] no    Patient is [x] able  [] unable to CONTRACT FOR SAFETY     PAST MEDICAL/PSYCHIATRIC HISTORY:   Past Medical History:   Diagnosis Date    Concussion with loss of consciousness of 30 minutes or less 6/30/2018    Herpes genitalia     Paranoid schizophrenia ([facility] [address]     Seizures ([facility] [address]     Ulcer        FAMILY/SOCIAL HISTORY:  Family History   Problem Relation Age of Onset    Diabetes Mother     Diabetes Father     Other Brother     Hypertension Maternal Grandmother      Social History     Socioeconomic History    Marital status: Single     Spouse name: Not on file    Number of children: 1    Years of education: 10    Highest education level: 10th grade   Occupational History    Not on file   Tobacco Use    Smoking status: Former     Types: Cigarettes     Quit date: 2/1/2022
CLINICAL PHARMACY NOTE: MEDS TO BEDS    Total # of Prescriptions Filled: 4   The following medications were delivered to the patient:  Oxcarbazepine 300 mg  Benztropine 1 mg  Melatonin 3 mg  Olanzapine ODT 10 mg    Additional Documentation:     Delivered meds to [facility] RN @[id number]
Leisure assessment complete. Patient able to answer questions, but did exhibit a slower response when answering. Patient maintained no eye contact with CTRS and kept her eyes closed throughout entire assessment.
PT. REPORTS READINESS FOR STEP DOWN TO THE CRISIS UNIT. PT. DENIED SUICIDAL IDEATIONS, HOMICIDAL IDEATIONS AND HALLUCINATIONS ON A.M. ASSESSMENTS AND VOICED NO DELUSIONS. PT. REMAINS MEDICATION COMPLIANT AND ATTENDS GROUPS.
Patient approached the nurses station stating that she is feeling anxious and asking for medications. Upon assessment patient states she is having auditory and visual hallucinations but when asked what she is hearing or seeing, patient states \"I don't know. \"  Patient appears internally stimulated, guarded, paranoid and preoccupied. Patient offered PRN Haldol and Vistaril PO, tolerating PRN medications well, without issue. See eMAR. Will continue to monitor and support. Purposeful rounds continued for safety.
Patient attended afternoon recreation activity, Sorry. Patient calm and cooperative, but sat on outside perimeter and watched her peers. Patient was 1 of 6 in attendance.
Patient attended community meeting   Was updated on expectations of the unit, staffing, and programming  Patient shared goal for today as continue being compliant with medications. Patient was 1 of 15 in attendance.
Patient attended community meeting   Was updated on expectations of the unit, staffing, and programming  Patient shared goal for today as do better with the voices in my head  Patient was 1 of 13 in attendance.
Patient attended community meeting   Was updated on expectations of the unit, staffing, and programming  Patient shared goal for today as to do better than I did yesterday  Patient was 1 of 13 in attendance
Patient attended community meeting   Was updated on expectations of the unit, staffing, and programming  Patient shared goal for today as to have a better day than yesterday.    Patient was 1 of 13 in attendance
Patient attended community meeting  Was updated on expectations of the unit, staffing, and programming  Patient shared goal for today as not sleep so much  Patient was 1 of 13 in attendance
Patient attended morning community meeting. Updated on staffing assignments and daily expectations. Shared goal for the day as to eliminate negative thoughts.
Patient attended morning community meeting. Updated on staffing assignments and daily expectations. Shared goal for the day as to have a better attitude.
Patient attended morning community meeting. Updated on staffing assignments and daily expectations. Shared goal for the day as to try to find peace within myself.
Patient declined invitation to the following groups    Recreation Activity, shuffleboard    Patient will continue to be provided with opportunities to enhance leisure skills/interests and/or coping mechanisms.
Patient declined to attend the following groups:    [employer] Activity    Will continue to encourage patient to attend programming.
Patient declined to attend the following groups:    Recreation Activity    Will continue to encourage patient to attend programming.
Patient denies SI, HI, or AVH. States she no longer hearing voices and that the meds are working. She states the meds, \"Make me have no feelings\". She is out on the unit, to self and attends groups. Takes her meds. Will monitor.
Patient denies SI,HI, or any AVH. She is out on the unit to self but will interact when approached. She is pleasant, cooperative, and friendly. She brightens on conversation at times. She sits in the chair on the unit to self. She attends groups and takes her meds. No behaviors noted. Will continue to monitor.
Patient is resting quietly in bed with eyes closed at this time. No signs of distress or discomfort noted. No PRN medications given thus far. Safety needs met. No unit problems reported. Purposeful rounding continued.
Patient is resting quietly in bed with eyes closed at this time. No signs of distress or discomfort noted. No PRN medications given thus far. Safety needs met. No unit problems reported. Purposeful rounding continued.
Patient resting quietly in bed with eyes closed. Respirations even and unlabored with no signs of distress observed. Environmental rounds continued.
Peering out of her doorway in [place]les slowly walking around as if she is looking for something blank stare preoccupied encouraged to turn in and rest
Prn vistaril ineffective pt not slept pacing in hallway wandering without purpose
Pt alert, anxious, and flat. Out on the unit coloring by herself. Pt denies SI, HI, and VH. States shes \"ok\". Pt appears paranoid. Admits to still having AH, described as chatter. Pt states it is \"about the same\". Pt ate snack. Compliant with meds and attended groups today. Denies anything is wrong.  Will continue to monitor
Pt alert,anxious, and preoccupied. Pt denies SI, HI, and VH. Continues to have audio hallucinations as pt describes as \"background noise\". Pt was out on the unit coloring, but keeps to self. Ate snack. States she is waiting for her LA injection. Pt currently on oral meds. States she is feeling  better. Denies any needs @ this time.  Will continue to monitor
Pt attended afternoon smoking cessation group. Pt appeared to be an active listener. Pt is able to share appropriately when prompted and asked facilitator relevant questions. Pt was participant 1 of 11.     Electronically signed by Huong Munoz on 3/17/2023 at 9:54 AM
Pt currently sleeping.  Q 15 minute rounding for safety continued
Pt currently sleeping.  Q 15 minute rounding for safety continued
Pt declined to attend community meeting and group
Pt escalating having increase paranoid delusional thoughts shouts at nurses \"Take me to ER see my daughter\" demanding we take here right now  \"don't you hear the shooting downstairs\" not redirecting Dr Tal Stanley called meds ordered given haldol 5mg and benadryl 50mg IM for agitation also looking out of her window as if someone is there pt complied with medication
Pt not sleeping repatively coming out of room standing in place in day room  starring off med with vistail 50mg po prn
Pt woke, anxious. Asked for her vitals to be taken. Heart rate elevated. Medicated with Vistaril. Pt denies SI, HI, and AVH. Denies having any dreams. Removed smoking patch from pt's arm. Pt went back to room.  Will continue to monitor
REPORT CALLED TO ANDERSON, NURSE AT THE CRISIS UNIT.
SW encouraged pt to attend psychotherapy. Pt declined to attend group therapy today.
Spiritual Support Group Note    Number of Participants in Group:    8                    Time: 2    Goal: Relief from isolation and loneliness             Fabi Sharing             Self-understanding and gain insight              Acceptance and belonging            Recognize they are not alone                Socialization             Empowerment       Encouragement    Topic:  [x] Spiritual Wellness and Self Care                  [] Hope                     [] Connecting with Divine/Others        [] Thankfulness and Gratitude               [x]  Meaningfulness and Purpose               [] Forgiveness               [x] Peace               [] Connect to [employer]      [] Other    Participation Level:   [x] Active Listener   [] Minimal   [] Monopolizing   [] Interactive   [] No Participation   []  Other:     Attention:   [x] Alert   [] Distractible   [] Drowsy   [] Poor   [] Other:    Manner:   [x] Cooperative   [] Suspicious   [] Withdrawn   [] Guarded   [] Irritable   [] Inhospitable   [] Other:     Others Comments from Group:
Spiritual Support Group Note    Number of Participants in Group:   8                     Time: 2    Goal: Relief from isolation and loneliness             Fabi Sharing             Self-understanding and gain insight              Acceptance and belonging            Recognize they are not alone                Socialization             Empowerment       Encouragement    Topic:  [x] Spiritual Wellness and Self Care                  [x] Hope                     [] Connecting with Divine/Others        [x] Thankfulness and Gratitude               []  Meaningfulness and Purpose               [] Forgiveness               [] Peace               [] Connect to [employer]      [] Other    Participation Level:   [x] Active Listener   [] Minimal   [] Monopolizing   [] Interactive   [] No Participation   []  Other:     Attention:   [x] Alert   [] Distractible   [] Drowsy   [] Poor   [] Other:    Manner:   [x] Cooperative   [] Suspicious   [] Withdrawn   [] Guarded   [] Irritable   [] Inhospitable   [] Other:     Others Comments from Group:
Devices: Disposable   Substance and Sexual Activity    Alcohol use: Yes     Comment: occasionally-holidays    Drug use: Yes     Frequency: 14.0 times per week     Types: Marijuana Valdene Bhat)    Sexual activity: Yes     Partners: Male   Other Topics Concern    Not on file   Social History Narrative    2/24/2021-Stress-pt reports that she is very much stressed-pt recently lost her mother and she has been having a hard time dealing with her loss. Pt is currently receiving grief counseling through [facility] in [employer]     Social Determinants of Health     Financial Resource Strain: Not on file   Food Insecurity: Not on file   Transportation Needs: Not on file   Physical Activity: Not on file   Stress: Not on file   Social Connections: Not on file   Intimate Partner Violence: Not on file   Housing Stability: Not on file           ROS:  [x] All negative/unchanged except if checked.  Explain positive(checked items) below:  [] Constitutional  [] Eyes  [] Ear/Nose/Mouth/Throat  [] Respiratory  [] CV  [] GI  []   [] Musculoskeletal  [] Skin/Breast  [] Neurological  [] Endocrine  [] Heme/Lymph  [] Allergic/Immunologic    Explanation:     MEDICATIONS:    Current Facility-Administered Medications:     OLANZapine zydis (ZYPREXA) disintegrating tablet 10 mg, 10 mg, Oral, Nightly, AKHIL Moreland - CNP, 10 mg at 03/24/23 2129    benztropine (COGENTIN) tablet 1 mg, 1 mg, Oral, BID, AKHIL Lin - CNP, 1 mg at 03/25/23 4044    diphenhydrAMINE (BENADRYL) injection 50 mg, 50 mg, IntraMUSCular, F2U PRN, AKHIL Lin - CNP    risperiDONE ER (PERSERIS) subcutaneous injection 120 mg, 120 mg, SubCUTAneous, Q30 Days, AKHIL Moreland - CNP, 857 mg at 03/21/23 1055    melatonin tablet 3 mg, 3 mg, Oral, Daily, AKHIL Lin - CNP, 3 mg at 03/24/23 1817    nicotine (NICODERM CQ) 21 MG/24HR 1 patch, 1 patch, TransDERmal, Daily, AKHIL Pierre - CNP, 1 patch at 03/25/23 0839    OXcarbazepine (TRILEPTAL) tablet 300
per week     Types: Marijuana [place])    Sexual activity: Yes     Partners: Male   Other Topics Concern    Not on file   Social History Narrative    2/24/2021-Stress-pt reports that she is very much stressed-pt recently lost her mother and she has been having a hard time dealing with her loss. Pt is currently receiving grief counseling through [facility] in [address]     Social Determinants of Health     Financial Resource Strain: Not on file   Food Insecurity: Not on file   Transportation Needs: Not on file   Physical Activity: Not on file   Stress: Not on file   Social Connections: Not on file   Intimate Partner Violence: Not on file   Housing Stability: Not on file           ROS:  [x] All negative/unchanged except if checked.  Explain positive(checked items) below:  [] Constitutional  [] Eyes  [] Ear/Nose/Mouth/Throat  [] Respiratory  [] CV  [] GI  []   [] Musculoskeletal  [] Skin/Breast  [] Neurological  [] Endocrine  [] Heme/Lymph  [] Allergic/Immunologic    Explanation:     MEDICATIONS:    Current Facility-Administered Medications:     buprenorphine-naloxone (SUBOXONE) 8-2 MG SL tablet 1 tablet, 1 tablet, SubLINGual, BID, Minhjoaquin Grider Dellick, APRN - CNP, 1 tablet at 03/17/23 5695    OXcarbazepine (TRILEPTAL) tablet 150 mg, 150 mg, Oral, BID, Minh Cones Dellick, APRN - CNP, 126 mg at 03/17/23 5572    risperiDONE (RISPERDAL M-TABS) disintegrating tablet 1 mg, 1 mg, Oral, BID, Minh Cones Dellick, APRN - CNP, 1 mg at 03/17/23 0927    droperidol (INAPSINE) injection 2.5 mg, 2.5 mg, IntraMUSCular, Q6H PRN, John Underwood MD, 2.5 mg at 03/15/23 1525    acetaminophen (TYLENOL) tablet 650 mg, 650 mg, Oral, E4O PRN, Minhjoaquin Grider Dellick, APRN - CNP    hydrOXYzine pamoate (VISTARIL) capsule 50 mg, 50 mg, Oral, TID PRN, Minh Grider Dellick, APRN - CNP, 50 mg at 03/16/23 2133    haloperidol (HALDOL) tablet 5 mg, 5 mg, Oral, Q4H PRN, 5 mg at 03/16/23 1111 **OR** haloperidol lactate (HALDOL) injection 5 mg, 5 mg, IntraMUSCular, F2Z Judith SERRATO May,
AKHIL Ren CNP, 956 mg at 03/25/23 2026    buprenorphine-naloxone (SUBOXONE) 8-2 MG SL tablet 1 tablet, 1 tablet, SubLINGual, BID, AKHIL Kelly CNP, 1 tablet at 03/25/23 2026    droperidol (INAPSINE) injection 2.5 mg, 2.5 mg, IntraMUSCular, Q6H PRN, Melony Weber MD, 2.5 mg at 03/15/23 1525    acetaminophen (TYLENOL) tablet 650 mg, 650 mg, Oral, Y0M PRN, AKHIL Crawford CNP, 045 mg at 03/22/23 3348    hydrOXYzine pamoate (VISTARIL) capsule 50 mg, 50 mg, Oral, TID PRN, AKHIL Crawford CNP, 50 mg at 03/24/23 1723    haloperidol (HALDOL) tablet 5 mg, 5 mg, Oral, Q4H PRN, 5 mg at 03/16/23 1111 **OR** haloperidol lactate (HALDOL) injection 5 mg, 5 mg, IntraMUSCular, O2P PRN, AKHIL Crawford CNP, 5 mg at 03/22/23 5993    magnesium hydroxide (MILK OF MAGNESIA) 400 MG/5ML suspension 30 mL, 30 mL, Oral, Daily PRN, AKHIL Kelly CNP    aluminum & magnesium hydroxide-simethicone (MAALOX) 200-200-20 MG/5ML suspension 30 mL, 30 mL, Oral, J5D PRN, AKHIL Kelly CNP, 30 mL at 03/16/23 8723      Examination:  BP (!) 81/51   Pulse 83   Temp 97.2 °F (36.2 °C) (Temporal)   Resp 14   Ht 5' 4\" (1.626 m)   Wt 150 lb (68 kg)   LMP 03/12/2023 (Exact Date) Comment: on now  SpO2 97%   BMI 25.75 kg/m²   Gait - steady  Medication side effects(SE):     Mental Status Examination:    Level of consciousness:  within normal limits   Appearance:  fair grooming and fair hygiene  Behavior/Motor:  no abnormalities noted  Attitude toward examiner:  cooperative  Speech:  spontaneous, normal rate and normal volume   Mood: \" I am okay. \"  Affect: Appropriate   thought processes: Linear thought flight of ideas loose associations  Thought content: Still has auditory hallucinations states are getting better denies any paranoia still having a denies SI/HI intent or plan   Language: able to name objects and repeate phrases  Remote Memory: intact  Recent Memory: intact  Cognition:  oriented to person,
change      Diagnosis:  Principal Problem:    Paranoid schizophrenia ([facility] [address]  Active Problems:    Cannabis abuse  Resolved Problems:    Bipolar 1 disorder, manic, moderate (HCC)      LABS:    No results for input(s): WBC, HGB, PLT in the last 72 hours. No results for input(s): NA, K, CL, CO2, BUN, CREATININE, GLUCOSE in the last 72 hours. No results for input(s): BILITOT, ALKPHOS, AST, ALT in the last 72 hours. Lab Results   Component Value Date/Time    LABAMPH NOT DETECTED 03/15/2023 04:37 AM    LABAMPH NOT DETECTED 07/30/2011 06:00 PM    BARBSCNU NOT DETECTED 03/15/2023 04:37 AM    LABBENZ NOT DETECTED 03/15/2023 04:37 AM    LABBENZ SEE BELOW 08/29/2014 05:00 PM    CANNAB POSITIVE 07/30/2011 06:00 PM    LABMETH NOT DETECTED 03/15/2023 04:37 AM    OPIATESCREENURINE NOT DETECTED 03/15/2023 04:37 AM    PHENCYCLIDINESCREENURINE NOT DETECTED 03/15/2023 04:37 AM    ETOH <10 03/15/2023 04:37 AM     Lab Results   Component Value Date/Time    TSH 0.899 07/14/2020 12:00 PM     No results found for: LITHIUM  No results found for: VALPROATE, CBMZ        Treatment Plan:  Reviewed current Medications with the patient. Risks, benefits, side effects, drug-to-drug interactions and alternatives to treatment were discussed. Collateral information:   CD evaluation  Encourage patient to attend group and other milieu activities.   Discharge planning discussed with the patient and treatment team.    Continue Trileptal 300 mg twice daily  Risperdal 2 mg twice daily last dose tonight   Perseris 120 mg subcu every 30 days tomorrow  continue Suboxone 8/2 twice daily    PSYCHOTHERAPY/COUNSELING:  [x] Therapeutic interview  [x] Supportive  [] CBT  [] Ongoing  [] Other    [x] Patient continues to need, on a daily basis, active treatment furnished directly by or requiring the supervision of inpatient psychiatric personnel      Anticipated Length of stay: 3 to 7 days based on stability            Electronically signed by Mervin Car
intact, pt is aware of current events and past history  Attetion and Concentration intact  Insight fair   Judgement fair     ASSESSMENT: Patient symptoms are:  [] Well controlled  [x] Improving  [] Worsening  [] No change      Diagnosis:  Principal Problem:    Paranoid schizophrenia ([facility] [address]  Active Problems:    Cannabis abuse  Resolved Problems:    Bipolar 1 disorder, manic, moderate (HCC)      LABS:    No results for input(s): WBC, HGB, PLT in the last 72 hours. No results for input(s): NA, K, CL, CO2, BUN, CREATININE, GLUCOSE in the last 72 hours. No results for input(s): BILITOT, ALKPHOS, AST, ALT in the last 72 hours. Lab Results   Component Value Date/Time    LABAMPH NOT DETECTED 03/15/2023 04:37 AM    LABAMPH NOT DETECTED 07/30/2011 06:00 PM    BARBSCNU NOT DETECTED 03/15/2023 04:37 AM    LABBENZ NOT DETECTED 03/15/2023 04:37 AM    LABBENZ SEE BELOW 08/29/2014 05:00 PM    CANNAB POSITIVE 07/30/2011 06:00 PM    LABMETH NOT DETECTED 03/15/2023 04:37 AM    OPIATESCREENURINE NOT DETECTED 03/15/2023 04:37 AM    PHENCYCLIDINESCREENURINE NOT DETECTED 03/15/2023 04:37 AM    ETOH <10 03/15/2023 04:37 AM     Lab Results   Component Value Date/Time    TSH 0.899 07/14/2020 12:00 PM     No results found for: LITHIUM  No results found for: VALPROATE, CBMZ        Treatment Plan:  Reviewed current Medications with the patient. Risks, benefits, side effects, drug-to-drug interactions and alternatives to treatment were discussed. Collateral information:   CD evaluation  Encourage patient to attend group and other milieu activities.   Discharge planning discussed with the patient and treatment team.    Continue Trileptal 300 mg twice daily  Perseris 120 mg subcu every 30 days tomorrow  continue Suboxone 8/2 twice daily  Continue Zyprexa to 10 mg at bedtime    PSYCHOTHERAPY/COUNSELING:  [x] Therapeutic interview  [x] Supportive  [] CBT  [] Ongoing  [] Other    [x] Patient continues to need, on a daily basis, active
schizophrenia ([facility] [address]  Active Problems:    Cannabis abuse  Resolved Problems:    Bipolar 1 disorder, manic, moderate ([facility])      LABS:    No results for input(s): WBC, HGB, PLT in the last 72 hours. No results for input(s): NA, K, CL, CO2, BUN, CREATININE, GLUCOSE in the last 72 hours. No results for input(s): BILITOT, ALKPHOS, AST, ALT in the last 72 hours. Lab Results   Component Value Date/Time    LABAMPH NOT DETECTED 03/15/2023 04:37 AM    LABAMPH NOT DETECTED 07/30/2011 06:00 PM    BARBSCNU NOT DETECTED 03/15/2023 04:37 AM    LABBENZ NOT DETECTED 03/15/2023 04:37 AM    LABBENZ SEE BELOW 08/29/2014 05:00 PM    CANNAB POSITIVE 07/30/2011 06:00 PM    LABMETH NOT DETECTED 03/15/2023 04:37 AM    OPIATESCREENURINE NOT DETECTED 03/15/2023 04:37 AM    PHENCYCLIDINESCREENURINE NOT DETECTED 03/15/2023 04:37 AM    ETOH <10 03/15/2023 04:37 AM     Lab Results   Component Value Date/Time    TSH 0.899 07/14/2020 12:00 PM     No results found for: LITHIUM  No results found for: VALPROATE, CBMZ        Treatment Plan:  Reviewed current Medications with the patient. Risks, benefits, side effects, drug-to-drug interactions and alternatives to treatment were discussed. Collateral information:   CD evaluation  Encourage patient to attend group and other milieu activities.   Discharge planning discussed with the patient and treatment team.    Increase Trileptal 300 mg twice daily  Increase Risperdal 1 mg daily and 2 mg at bedtime  Continue Suboxone 8/2B TK    PSYCHOTHERAPY/COUNSELING:  [x] Therapeutic interview  [x] Supportive  [] CBT  [] Ongoing  [] Other    [x] Patient continues to need, on a daily basis, active treatment furnished directly by or requiring the supervision of inpatient psychiatric personnel      Anticipated Length of stay: 3 to 7 days based on stability            Electronically signed by AKHIL Vera CNP on 3/18/2023 at 12:29 PM
APRN - CNP on 3/21/2023 at 9:05 AM
interview  [x] Supportive  [] CBT  [] Ongoing  [] Other    [x] Patient continues to need, on a daily basis, active treatment furnished directly by or requiring the supervision of inpatient psychiatric personnel      Anticipated Length of stay: 3 to 7 days based on stability            Electronically signed by AKHIL Bernardo CNP on 7/09/5066 at 3:58 PM

## 2023-03-27 NOTE — GROUP NOTE
Group Therapy Note    Date: 3/27/2023  Start Time: 9330  End Time:  1050  Number of Participants: 14    Type of Group: Psychoeducation    Wellness Binder Information  Module Name:  Grounding Techniques, 56547 method    Patient's Goal:  Discuss grounding techniques and demonstrated use and knowledge of 63449 method and body scan. ID one grounding technique one can utilize in their life. Notes:  Patients actively engaged in discussion of what grounding is and techniques that can be used. Patients actively engaged in demonstration of two techniques including 74016, and body scanning. Status After Intervention:  Improved    Participation Level:  Active Listener and Interactive    Participation Quality: Appropriate and Attentive      Speech:  normal      Thought Process/Content: Logical      Affective Functioning: Congruent      Mood: euthymic      Level of consciousness:  Alert and Attentive      Response to Learning: Able to verbalize current knowledge/experience and Able to verbalize/acknowledge new learning      Endings: None Reported    Modes of Intervention: Education and Exploration      Discipline Responsible: Psychoeducational Specialist      Signature:  TORO Garcia     Group Therapy Note

## 2023-03-27 NOTE — PLAN OF CARE
Addendum for 5p on 3.19.    attended approx 2/3 of today's 1 h group on insight and problem solving ( home improvement ). Scored 100 on the written warm up written project. Although present for and observing the a/v program, D stated she not clear on what she was supposed to do w the study sheet questions. However, Trevor Daily was courteous and respectful to peers who were going up and doing their presentations ( in answ to the study sheet questions ). Appeared to digest some of the key learning points.
PT. TO ROOM AFTER FALLING REPEATEDLY ASLEEP AT TABLE IN COMMUNITY AREA. [facility]  Week Interdisciplinary Treatment Plan Note     Review Date & Time:  3/22/23 0900    Patient was not in treatment team, excused.      Estimated Length of Stay Update:   10 DAYS   Estimated Discharge Date Update:  FRIDAY    EDUCATION:   Learner Progress Toward Treatment Goals: Reviewed results and recommendations of this team    Method: Individual    Outcome: Needs reinforcement    PATIENT GOALS: none reported    PLAN/TREATMENT RECOMMENDATIONS UPDATE:  add Zyprexa and monitor for response, groups as tolerated, assess for psychosis, supportive  care, discharge planning and follow up    GOALS UPDATE:  Time frame for Short-Term Goals:  10 days      Aaron Johnson RN
Problem: Anxiety  Goal: Will report anxiety at manageable levels  Description: INTERVENTIONS:  1. Administer medication as ordered  2. Teach and rehearse alternative coping skills  3. Provide emotional support with 1:1 interaction with staff  3/22/2023 2057 by Brad Harvey RN  Outcome: Progressing  3/22/2023 1202 by Raji Reza RN  Outcome: Not Progressing     Problem: Coping  Goal: Pt/Family able to verbalize concerns and demonstrate effective coping strategies  Description: INTERVENTIONS:  1. Assist patient/family to identify coping skills, available support systems and cultural and spiritual values  2. Provide emotional support, including active listening and acknowledgement of concerns of patient and caregivers  3. Reduce environmental stimuli, as able  4. Instruct patient/family in relaxation techniques, as appropriate  5. Assess for spiritual pain/suffering and initiate Spiritual Care, Psychosocial Clinical Specialist consults as needed  3/22/2023 2057 by Brad Harvey RN  Outcome: Progressing  3/22/2023 1202 by Raji Reza RN  Outcome: Not Progressing      Pt denies SI, HI and AVH. Pt out on the unit by herself. Pt was playing solitaire and observed playing it correctly. Pt is delayed. Flat. Out on the unit the whole shift. Denies depression and anxiety. Showered Pt falling asleep in chair as she is finishing her picture with markers. Medication compliant. Attending groups. Appetite appropriate. Will continue to monitor.
Problem: Anxiety  Goal: Will report anxiety at manageable levels  Description: INTERVENTIONS:  1. Administer medication as ordered  2. Teach and rehearse alternative coping skills  3. Provide emotional support with 1:1 interaction with staff  Outcome: Not Progressing     Problem: Behavior  Goal: Pt/Family maintain appropriate behavior and adhere to behavioral management agreement, if implemented  Description: INTERVENTIONS:  1. Assess patient/family's coping skills and  non-compliant behavior (including use of illegal substances)  2. Notify security of behavior or suspected illegal substances which indicate the need for search of the family and/or belongings  3. Encourage verbalization of thoughts and concerns in a socially appropriate manner  4. Utilize positive, consistent limit setting strategies supporting safety of patient, staff and others  5. Encourage participation in the decision making process about the behavioral management agreement  6. If a visitor's behavior poses a threat to safety call refer to organization policy. 7. Initiate consult with , Psychosocial CNS, Spiritual Care as appropriate  Outcome: Not Progressing      Patient pre occupied and tearful. Mood liable, flat and depressed. Using vistaril regularly. Still hearing noises in the background. Denies suicidal and homicidal thoughts. Denies hallucinations.
Problem: Anxiety  Goal: Will report anxiety at manageable levels  Description: INTERVENTIONS:  1. Administer medication as ordered  2. Teach and rehearse alternative coping skills  3. Provide emotional support with 1:1 interaction with staff  Outcome: Progressing     Problem: Confusion  Goal: Confusion, delirium, dementia, or psychosis is improved or at baseline  Description: INTERVENTIONS:  1. Assess for possible contributors to thought disturbance, including medications, impaired vision or hearing, underlying metabolic abnormalities, dehydration, psychiatric diagnoses, and notify attending LIP  2. [place] high risk fall precautions, as indicated  3. Provide frequent short contacts to provide reality reorientation, refocusing and direction  4. Decrease environmental stimuli, including noise as appropriate  5. Monitor and intervene to maintain adequate nutrition, hydration, elimination, sleep and activity  6. If unable to ensure safety without constant attention obtain sitter and review sitter guidelines with assigned personnel  7. Initiate Psychosocial CNS and Spiritual Care consult, as indicated  Outcome: Progressing     Patient has been in and out of her room. Keeps to self. During assessment, patient appeared preoccupied. She is focused on discharge but is asking for \"a shot\". Patient did not appear agitated at this time. Patient is admitting to having auditory hallucinations of \"people saying weird stuff and they are acting like I did something, like I ate people\". Denies visual hallucinations. Denies suicidal and homicidal ideations as well. Rates anxiety 8/10 and depression 7/10 d/t \"the voices\". Patient did shower this evening-when walking back to her room, patient did not put a gown on, but only covered self up with a towel. James Colton had previously been provided. Patient was easily redirected. Purposeful rounding continued.
Problem: Anxiety  Goal: Will report anxiety at manageable levels  Description: INTERVENTIONS:  1. Administer medication as ordered  2. Teach and rehearse alternative coping skills  3. Provide emotional support with 1:1 interaction with staff  Outcome: Progressing  Flowsheets (Taken 3/21/2023 1035)  Will report anxiety at manageable levels: Administer medication as ordered     Problem: Coping  Goal: Pt/Family able to verbalize concerns and demonstrate effective coping strategies  Description: INTERVENTIONS:  1. Assist patient/family to identify coping skills, available support systems and cultural and spiritual values  2. Provide emotional support, including active listening and acknowledgement of concerns of patient and caregivers  3. Reduce environmental stimuli, as able  4. Instruct patient/family in relaxation techniques, as appropriate  5.  Assess for spiritual pain/suffering and initiate Spiritual Care, Psychosocial Clinical Specialist consults as needed  Outcome: Progressing  Flowsheets (Taken 3/21/2023 1035)  Patient/family able to verbalize anxieties, fears, and concerns, and demonstrate effective coping: Assist patient/family to identify coping skills, available support systems and cultural and spiritual values
Problem: Risk for Elopement  Goal: Patient will not exit the unit/facility without proper excort  Outcome: Progressing  NO ELOPEMENT ATTEMPTS OR BEHAVIORS. PT. IS COOPERATIVE TO MEDICATIONS AND GROUPS. OUT ON UNIT, QUIET AND COLORING. Problem: Anxiety  Goal: Will report anxiety at manageable levels  Description: INTERVENTIONS:  1. Administer medication as ordered  2. Teach and rehearse alternative coping skills  3. Provide emotional support with 1:1 interaction with staff  Outcome: Progressing  PT. CALM, GUARDED AND HESITANT. SLOW TO RESPOND. FALLS ASLEEP AT TABLE AFTER LUNCH. Problem: Coping  Goal: Pt/Family able to verbalize concerns and demonstrate effective coping strategies  Description: INTERVENTIONS:  1. Assist patient/family to identify coping skills, available support systems and cultural and spiritual values  2. Provide emotional support, including active listening and acknowledgement of concerns of patient and caregivers  3. Reduce environmental stimuli, as able  4. Instruct patient/family in relaxation techniques, as appropriate  5. Assess for spiritual pain/suffering and initiate Spiritual Care, Psychosocial Clinical Specialist consults as needed  Outcome: Progressing  PT. IS COOPERATIVE TO TREATMENT, ASSESSMENTS AND UNIT ROUTINE. Problem: Decision Making  Goal: Pt/Family able to effectively weigh alternatives and participate in decision making related to treatment and care  Description: INTERVENTIONS:  1. Determine when there are differences between patient's view, family's view, and healthcare provider's view of condition  2. Facilitate patient and family articulation of goals for care  3. Help patient and family identify pros/cons of alternative solutions  4. Provide information as requested by patient/family  5. Respect patient/family right to receive or not to receive information  6. Serve as a liaison between patient and family and health care team  7.  Initiate Consults from
Problem: Risk for Elopement  Goal: Patient will not exit the unit/facility without proper excort  Outcome: Progressing  NO ELOPEMENT BEHAVIORS. Problem: Anxiety  Goal: Will report anxiety at manageable levels  Description: INTERVENTIONS:  1. Administer medication as ordered  2. Teach and rehearse alternative coping skills  3. Provide emotional support with 1:1 interaction with staff  Outcome: Progressing  AWAKE AND CALM. OUT ON UNIT AND IS IN GOOD CONTROL. Problem: Coping  Goal: Pt/Family able to verbalize concerns and demonstrate effective coping strategies  Description: INTERVENTIONS:  1. Assist patient/family to identify coping skills, available support systems and cultural and spiritual values  2. Provide emotional support, including active listening and acknowledgement of concerns of patient and caregivers  3. Reduce environmental stimuli, as able  4. Instruct patient/family in relaxation techniques, as appropriate  5. Assess for spiritual pain/suffering and initiate Spiritual Care, Psychosocial Clinical Specialist consults as needed  Outcome: Progressing  PT. IS MEDICATION COMPLIANT. Problem: Decision Making  Goal: Pt/Family able to effectively weigh alternatives and participate in decision making related to treatment and care  Description: INTERVENTIONS:  1. Determine when there are differences between patient's view, family's view, and healthcare provider's view of condition  2. Facilitate patient and family articulation of goals for care  3. Help patient and family identify pros/cons of alternative solutions  4. Provide information as requested by patient/family  5. Respect patient/family right to receive or not to receive information  6. Serve as a liaison between patient and family and health care team  7. Initiate Consults from Ethics, Palliative Care or initiate [address] as is appropriate  Outcome: Progressing  PT. IS COOPERATIVE TO UNIT ROUTINE AND ASSESSMENTS.
Pt resting in bed apparently asleep with easy even respirations at HS q 15 min electronic rounding.
Pt resting in bed apparently asleep with easy even respirations at HS q 15 min electronic rounding.
The patient met with the treatment team. She appears calm, less preoccupied, paranoid and somewhat coherent. She denies feeling depressed and anxious, reports AVH, denies SI/HI and intrusive racing thoughts. She states that medications are helping and voices are muffled now. We will continue to monitor her. Problem: Anxiety  Goal: Will report anxiety at manageable levels  Description: INTERVENTIONS:  1. Administer medication as ordered  2. Teach and rehearse alternative coping skills  3. Provide emotional support with 1:1 interaction with staff  3/20/2023 1031 by Judy Aguilar RN  Outcome: Not Progressing  3/20/2023 0113 by Maurizio Lundberg RN  Outcome: Progressing     Problem: Confusion  Goal: Confusion, delirium, dementia, or psychosis is improved or at baseline  Description: INTERVENTIONS:  1. Assess for possible contributors to thought disturbance, including medications, impaired vision or hearing, underlying metabolic abnormalities, dehydration, psychiatric diagnoses, and notify attending LIP  2. [place] high risk fall precautions, as indicated  3. Provide frequent short contacts to provide reality reorientation, refocusing and direction  4. Decrease environmental stimuli, including noise as appropriate  5. Monitor and intervene to maintain adequate nutrition, hydration, elimination, sleep and activity  6. If unable to ensure safety without constant attention obtain sitter and review sitter guidelines with assigned personnel  7.  Initiate Psychosocial CNS and Spiritual Care consult, as indicated  Outcome: Not Progressing
Confusion, delirium, dementia, or psychosis is improved or at baseline  Description: INTERVENTIONS:  1. Assess for possible contributors to thought disturbance, including medications, impaired vision or hearing, underlying metabolic abnormalities, dehydration, psychiatric diagnoses, and notify attending LIP  2. [place] high risk fall precautions, as indicated  3. Provide frequent short contacts to provide reality reorientation, refocusing and direction  4. Decrease environmental stimuli, including noise as appropriate  5. Monitor and intervene to maintain adequate nutrition, hydration, elimination, sleep and activity  6. If unable to ensure safety without constant attention obtain sitter and review sitter guidelines with assigned personnel  7. Initiate Psychosocial CNS and Spiritual Care consult, as indicated  Outcome: Not Progressing  PT. DENIED ACTIVE HALLUCINATIONS AND BELIEF THAT DAUGHTER WAS IN DANGER ON A.M. ASSESSMENT. Problem: Behavior  Goal: Pt/Family maintain appropriate behavior and adhere to behavioral management agreement, if implemented  Description: INTERVENTIONS:  1. Assess patient/family's coping skills and  non-compliant behavior (including use of illegal substances)  2. Notify security of behavior or suspected illegal substances which indicate the need for search of the family and/or belongings  3. Encourage verbalization of thoughts and concerns in a socially appropriate manner  4. Utilize positive, consistent limit setting strategies supporting safety of patient, staff and others  5. Encourage participation in the decision making process about the behavioral management agreement  6. If a visitor's behavior poses a threat to safety call refer to organization policy. 7. Initiate consult with , Psychosocial CNS, Spiritual Care as appropriate  Outcome: Not Progressing  PT. KEEPS TO SELF, NO OBSERVED SPONTANEOUS PEER INTERACTION.       Problem: Risk for Elopement  Goal: Patient
Progressing
dehydration, psychiatric diagnoses, and notify attending LIP  2. [place] high risk fall precautions, as indicated  3. Provide frequent short contacts to provide reality reorientation, refocusing and direction  4. Decrease environmental stimuli, including noise as appropriate  5. Monitor and intervene to maintain adequate nutrition, hydration, elimination, sleep and activity  6. If unable to ensure safety without constant attention obtain sitter and review sitter guidelines with assigned personnel  7. Initiate Psychosocial CNS and Spiritual Care consult, as indicated  Outcome: Not Progressing     Problem: Behavior  Goal: Pt/Family maintain appropriate behavior and adhere to behavioral management agreement, if implemented  Description: INTERVENTIONS:  1. Assess patient/family's coping skills and  non-compliant behavior (including use of illegal substances)  2. Notify security of behavior or suspected illegal substances which indicate the need for search of the family and/or belongings  3. Encourage verbalization of thoughts and concerns in a socially appropriate manner  4. Utilize positive, consistent limit setting strategies supporting safety of patient, staff and others  5. Encourage participation in the decision making process about the behavioral management agreement  6. If a visitor's behavior poses a threat to safety call refer to organization policy. 7. Initiate consult with , Psychosocial CNS, Spiritual Care as appropriate  Outcome: Not Progressing     Problem: Involuntary Admit  Goal: Will cooperate with staff recommendations and doctor's orders and will demonstrate appropriate behavior  Description: INTERVENTIONS:  1. Treat underlying conditions and offer medication as ordered  2. Educate regarding involuntary admission procedures and rules  3.  Contain excessive/inappropriate behavior per unit and hospital policies  Outcome: Not Progressing
denies SI, HI and AVH. Pt stated her anxiety is a 5/10. Denies depression. Pt presents flat. Calm and cooperative. Appetite appropriate. Medication compliant. Less lethargic on the unit. Attended group. Will continue to monitor.
disturbance, including medications, impaired vision or hearing, underlying metabolic abnormalities, dehydration, psychiatric diagnoses, and notify attending LIP  2. [place] high risk fall precautions, as indicated  3. Provide frequent short contacts to provide reality reorientation, refocusing and direction  4. Decrease environmental stimuli, including noise as appropriate  5. Monitor and intervene to maintain adequate nutrition, hydration, elimination, sleep and activity  6. If unable to ensure safety without constant attention obtain sitter and review sitter guidelines with assigned personnel  7. Initiate Psychosocial CNS and Spiritual Care consult, as indicated  3/23/2023 1827 by Shereen Cavanaugh RN  Outcome: Progressing     Problem: Behavior  Goal: Pt/Family maintain appropriate behavior and adhere to behavioral management agreement, if implemented  Description: INTERVENTIONS:  1. Assess patient/family's coping skills and  non-compliant behavior (including use of illegal substances)  2. Notify security of behavior or suspected illegal substances which indicate the need for search of the family and/or belongings  3. Encourage verbalization of thoughts and concerns in a socially appropriate manner  4. Utilize positive, consistent limit setting strategies supporting safety of patient, staff and others  5. Encourage participation in the decision making process about the behavioral management agreement  6. If a visitor's behavior poses a threat to safety call refer to organization policy.   7. Initiate consult with , Psychosocial CNS, Spiritual Care as appropriate  3/23/2023 1827 by Shereen Cavanaugh RN  Outcome: Progressing
information as requested by patient/family  5. Respect patient/family right to receive or not to receive information  6. Serve as a liaison between patient and family and health care team  7. Initiate Consults from Ethics, Palliative Care or initiate [address] [place] [address] as is appropriate  Outcome: Progressing     Problem: Confusion  Goal: Confusion, delirium, dementia, or psychosis is improved or at baseline  Description: INTERVENTIONS:  1. Assess for possible contributors to thought disturbance, including medications, impaired vision or hearing, underlying metabolic abnormalities, dehydration, psychiatric diagnoses, and notify attending LIP  2. [place] high risk fall precautions, as indicated  3. Provide frequent short contacts to provide reality reorientation, refocusing and direction  4. Decrease environmental stimuli, including noise as appropriate  5. Monitor and intervene to maintain adequate nutrition, hydration, elimination, sleep and activity  6. If unable to ensure safety without constant attention obtain sitter and review sitter guidelines with assigned personnel  7. Initiate Psychosocial CNS and Spiritual Care consult, as indicated  3/17/2023 0930 by Sona Nuno RN  Outcome: Progressing  3/16/2023 2053 by Randy Márquez RN  Outcome: Progressing     Problem: Behavior  Goal: Pt/Family maintain appropriate behavior and adhere to behavioral management agreement, if implemented  Description: INTERVENTIONS:  1. Assess patient/family's coping skills and  non-compliant behavior (including use of illegal substances)  2. Notify security of behavior or suspected illegal substances which indicate the need for search of the family and/or belongings  3. Encourage verbalization of thoughts and concerns in a socially appropriate manner  4. Utilize positive, consistent limit setting strategies supporting safety of patient, staff and others  5.  Encourage participation in the decision making process about the
personnel  7. Initiate Psychosocial CNS and Spiritual Care consult, as indicated  3/20/2023 1031 by Christie Cooney, RN  Outcome: Not Progressing     Pt denies SI, HI and AVH. Pt stated Depression 6/10 and anxiety 8/10. Pt has good eye contact but thought blocking. Focused on discharge. Pt out on the unit at intervals. Preoccupied. Medication compliant. Attends groups. Out but to herself. Will continue to monitor.
precautions, as indicated  3. Provide frequent short contacts to provide reality reorientation, refocusing and direction  4. Decrease environmental stimuli, including noise as appropriate  5. Monitor and intervene to maintain adequate nutrition, hydration, elimination, sleep and activity  6. If unable to ensure safety without constant attention obtain sitter and review sitter guidelines with assigned personnel  7.  Initiate Psychosocial CNS and Spiritual Care consult, as indicated  3/18/2023 1023 by Aleksandra Sánchez RN  Outcome: Not Progressing  3/18/2023 0422 by Silva Vaz RN  Outcome: Progressing  3/17/2023 2027 by Jack Gilford, RN  Outcome: Progressing     Problem: Pain  Goal: Verbalizes/displays adequate comfort level or baseline comfort level  3/18/2023 1023 by Aleksandra Sánchez RN  Outcome: Not Progressing  3/18/2023 0422 by Silva Vaz RN  Outcome: Progressing
receive or not to receive information  6. Serve as a liaison between patient and family and health care team  7. Initiate Consults from Ethics, Palliative Care or initiate [facility] as is appropriate  3/25/2023 1124 by Godfrey Mensah RN  Outcome: Progressing  3/25/2023 0443 by Little Herring RN  Outcome: Progressing  3/24/2023 2200 by Artem Boyer RN  Outcome: Progressing     Problem: Confusion  Goal: Confusion, delirium, dementia, or psychosis is improved or at baseline  Description: INTERVENTIONS:  1. Assess for possible contributors to thought disturbance, including medications, impaired vision or hearing, underlying metabolic abnormalities, dehydration, psychiatric diagnoses, and notify attending LIP  2. [place] high risk fall precautions, as indicated  3. Provide frequent short contacts to provide reality reorientation, refocusing and direction  4. Decrease environmental stimuli, including noise as appropriate  5. Monitor and intervene to maintain adequate nutrition, hydration, elimination, sleep and activity  6. If unable to ensure safety without constant attention obtain sitter and review sitter guidelines with assigned personnel  7. Initiate Psychosocial CNS and Spiritual Care consult, as indicated  3/25/2023 1124 by Godfrey Mensah RN  Outcome: Progressing  3/25/2023 0443 by Little Herring RN  Outcome: Progressing     Problem: Behavior  Goal: Pt/Family maintain appropriate behavior and adhere to behavioral management agreement, if implemented  Description: INTERVENTIONS:  1. Assess patient/family's coping skills and  non-compliant behavior (including use of illegal substances)  2. Notify security of behavior or suspected illegal substances which indicate the need for search of the family and/or belongings  3. Encourage verbalization of thoughts and concerns in a socially appropriate manner  4.  Utilize positive, consistent limit setting strategies supporting safety of patient, staff and
thought disturbance, including medications, impaired vision or hearing, underlying metabolic abnormalities, dehydration, psychiatric diagnoses, and notify attending LIP  2. [place] high risk fall precautions, as indicated  3. Provide frequent short contacts to provide reality reorientation, refocusing and direction  4. Decrease environmental stimuli, including noise as appropriate  5. Monitor and intervene to maintain adequate nutrition, hydration, elimination, sleep and activity  6. If unable to ensure safety without constant attention obtain sitter and review sitter guidelines with assigned personnel  7. Initiate Psychosocial CNS and Spiritual Care consult, as indicated  Outcome: Progressing     Problem: Behavior  Goal: Pt/Family maintain appropriate behavior and adhere to behavioral management agreement, if implemented  Description: INTERVENTIONS:  1. Assess patient/family's coping skills and  non-compliant behavior (including use of illegal substances)  2. Notify security of behavior or suspected illegal substances which indicate the need for search of the family and/or belongings  3. Encourage verbalization of thoughts and concerns in a socially appropriate manner  4. Utilize positive, consistent limit setting strategies supporting safety of patient, staff and others  5. Encourage participation in the decision making process about the behavioral management agreement  6. If a visitor's behavior poses a threat to safety call refer to organization policy. 7. Initiate consult with , Psychosocial CNS, Spiritual Care as appropriate  Outcome: Progressing     Problem: Involuntary Admit  Goal: Will cooperate with staff recommendations and doctor's orders and will demonstrate appropriate behavior  Description: INTERVENTIONS:  1. Treat underlying conditions and offer medication as ordered  2. Educate regarding involuntary admission procedures and rules  3.  Contain excessive/inappropriate behavior per unit
making process about the behavioral management agreement  6. If a visitor's behavior poses a threat to safety call refer to organization policy. 7. Initiate consult with , Psychosocial CNS, Spiritual Care as appropriate  Outcome: Progressing  Flowsheets (Taken 3/21/2023 1035 by Trell Da Silva, RN)  Patient/family maintains appropriate behavior and adheres to behavioral management agreement, if implemented: Assess patient/familys coping skills and  non-compliant behavior (including use of illegal substances)     Problem: Anxiety  Goal: Will report anxiety at manageable levels  Description: INTERVENTIONS:  1. Administer medication as ordered  2. Teach and rehearse alternative coping skills  3.  Provide emotional support with 1:1 interaction with staff  3/21/2023 1631 by Kimberly Palomares RN  Outcome: Not Progressing  3/21/2023 1039 by Trell Da Silva, SHERIE  Outcome: Progressing  Flowsheets (Taken 3/21/2023 1035)  Will report anxiety at manageable levels: Administer medication as ordered
non-compliant behavior (including use of illegal substances)  2. Notify security of behavior or suspected illegal substances which indicate the need for search of the family and/or belongings  3. Encourage verbalization of thoughts and concerns in a socially appropriate manner  4. Utilize positive, consistent limit setting strategies supporting safety of patient, staff and others  5. Encourage participation in the decision making process about the behavioral management agreement  6. If a visitor's behavior poses a threat to safety call refer to organization policy. 7. Initiate consult with , Psychosocial CNS, Spiritual Care as appropriate  3/15/2023 2147 by Aminah Reza RN  Outcome: Progressing  3/15/2023 1729 by Severiano Lang RN  Outcome: Not Progressing     Problem: Involuntary Admit  Goal: Will cooperate with staff recommendations and doctor's orders and will demonstrate appropriate behavior  Description: INTERVENTIONS:  1. Treat underlying conditions and offer medication as ordered  2. Educate regarding involuntary admission procedures and rules  3.  Contain excessive/inappropriate behavior per unit and hospital policies  3/37/2142 5463 by Severiano Lang RN  Outcome: Not Progressing
procedures and rules  3.  Contain excessive/inappropriate behavior per unit and hospital policies  3/88/7376 9241 by Tiffanie Christine RN  Outcome: Progressing

## 2023-03-27 NOTE — GROUP NOTE
Group Therapy Note    Date: 3/27/2023    Group Start Time: 6376  Group End Time: 1500  Group Topic: Education Group - Inpatient    [facility] 7[facility] ACUTE BH 2    Wei Marks                                                                        Group Therapy Note    Date: 3/27/2023  Start Time: 1415  End Time:  1500  Number of Participants: 11    Type of Group: Recreational    Wellness Binder Information  Module Name:  Guess it game    Patient's Goal:  to increase social skills and cooperation amongst peers    Notes:  Patient actively engaged in [facility] game were one had to guess the correct answer to categories including logos, finish the lyrics, geography, riddles, name that tune. Status After Intervention:  Improved    Participation Level:  Active Listener and Interactive    Participation Quality: Appropriate, Attentive, and Supportive      Speech:  normal      Thought Process/Content: Logical      Affective Functioning: Congruent      Mood:  calm and cooperative      Level of consciousness:  Alert, Oriented x4, and Attentive      Response to Learning: Able to verbalize current knowledge/experience, Able to verbalize/acknowledge new learning, and Able to retain information      Endings: None Reported    Modes of Intervention: Socialization, Education      Discipline Responsible: Psychoeducational Specialist      Signature:  TORO Marks     Group Therapy Note    Attendees: 11

## 2023-03-27 NOTE — DISCHARGE SUMMARY
Other Topics Concern    Not on file   Social History Narrative    2/24/2021-Stress-pt reports that she is very much stressed-pt recently lost her mother and she has been having a hard time dealing with her loss.  Pt is currently receiving grief counseling through [facility] in [place] [address] Determinants of Health     Financial Resource Strain: Not on file   Food Insecurity: Not on file   Transportation Needs: Not on file   Physical Activity: Not on file   Stress: Not on file   Social Connections: Not on file   Intimate Partner Violence: Not on file   Housing Stability: Not on file       MEDICATIONS:    Current Facility-Administered Medications:     OLANZapine zydis (ZYPREXA) disintegrating tablet 10 mg, 10 mg, Oral, Nightly, Mark Anthony Ren, APRN - CNP, 10 mg at 03/26/23 2054    benztropine (COGENTIN) tablet 1 mg, 1 mg, Oral, BID, Mark Anthony Ren, APRN - CNP, 1 mg at 03/27/23 0859    diphenhydrAMINE (BENADRYL) injection 50 mg, 50 mg, IntraMUSCular, C7X PRN, Mark Anthony Ren, APRN - CNP    risperiDONE ER (PERSERIS) subcutaneous injection 120 mg, 120 mg, SubCUTAneous, Q30 Days, Adina RAMONITA Ren APRN - CNP, 565 mg at 03/21/23 1055    melatonin tablet 3 mg, 3 mg, Oral, Daily, Mark Anthony Ren, APRN - CNP, 3 mg at 03/26/23 1808    nicotine (NICODERM CQ) 21 MG/24HR 1 patch, 1 patch, TransDERmal, Daily, AKHIL Mae - CNP, 1 patch at 03/27/23 0859    OXcarbazepine (TRILEPTAL) tablet 300 mg, 300 mg, Oral, BID, Adina RAMONITA Ren APRN - CNP, 784 mg at 03/27/23 0859    buprenorphine-naloxone (SUBOXONE) 8-2 MG SL tablet 1 tablet, 1 tablet, SubLINGual, BID, Mark Anthony Ren, APRN - CNP, 1 tablet at 03/27/23 0859    droperidol (INAPSINE) injection 2.5 mg, 2.5 mg, IntraMUSCular, Q6H PRN, Jelly Koo MD, 2.5 mg at 03/15/23 1525    acetaminophen (TYLENOL) tablet 650 mg, 650 mg, Oral, Q7G PRN, Mark Anthony Ren, APRN - CNP, 562 mg at 03/22/23 0853    hydrOXYzine pamoate (VISTARIL) capsule 50 mg, 50 mg, Oral, TID PRN, Abelino Rangel

## 2023-03-27 NOTE — GROUP NOTE
Group Therapy Note    Date: 3/27/2023    Group Start Time: 1110  Group End Time: 1150  Group Topic: Psychotherapy    [facility] ACUTE  [address], [facility]        Group Therapy Note    Attendees: 6       Patient Goal:  To increase social interaction and improve relationships with others. Notes: Pt was attentive in group and was able to identify an agenda. Pt was also able to verbalize relating to others within the group. Status After Intervention:  Improved    Participation Level:  Active Listener    Participation Quality: Appropriate and Attentive      Speech:  normal      Thought Process/Content: Logical      Affective Functioning: Flat      Mood: anxious      Level of consciousness:  Alert, Oriented x4, and Attentive      Response to Learning: Able to verbalize current knowledge/experience, Able to verbalize/acknowledge new learning, and Able to retain information      Endings: None Reported    Modes of Intervention: Support, Socialization, and Exploration      Discipline Responsible: /Counselor      Signature:  YNES Dunn, [place]

## 2023-03-27 NOTE — BH NOTE
[facility]  Day 3 Interdisciplinary Treatment Plan NOTE    Review Date & Time: 5799 3.17.23    Patient was not in treatment team    Estimated Length of Stay Update:  3-5 days  Estimated Discharge Date Update: 3-5 days    EDUCATION:   Learner Progress Toward Treatment Goals: Reviewed results and recommendations of this team    Method: Small group    Outcome: Verbalized understanding    PATIENT GOALS: \"I am hearing voices\"    PLAN/TREATMENT RECOMMENDATIONS UPDATE:Continue to assess need for inpatient level of care.     GOALS UPDATE:   Time frame for Short-Term Goals: 48 hours      Dell Jacques RN
Pt denies suicidal / homicidal ideations. Pt denies hallucinations. Pt is without immediate behavioral concerns at this time. Will follow and monitor.
Pt denies suicidal / homicidal ideations. Pt denies visual hallucinations. Pt reports auditory hallucinations, negative commands. Pt does appear to be internally stimulated. No other complications.
Pt has been out on the unit but mainly keeps to herself coloring pictures. On approach she presents as preoccupied with flat affect. Her thoughts are delayed, concentration poor. She denies any harmful ideations and depression. She does admit to some anxiety which she rates 7/10 and states that it is because of a female peer that became agitated and acting out. She also admits to still hearing voices but states that they are not as bad.
Pt has been out on the unit but mainly keeps to herself. Her affect is flat and she presents as preoccupied with slowed thoughts. She denies any harmful ideations but admits to hearing voices. She denies any depression but rates her anxiety 7/10.
Pt is out on the unit but keeps to herself. On approach her affect is flat and presents as preoccupied. Her concentration is impaired and responses are slowed. She states that she is still hearing voices but that they are a little improved. She denies that they are telling her to hurt herself or others and states that they say things like call your daughter. She denies any harmful ideations.
Pt resting in room with eyes closed. Q15min checks continue.
Pt resting in room with eyes closed. Q15min checks continue.
The patient was sitting in day myles when approached for assessment. She appears unkempt, preoccupied, labile, paranoid and tearful at times upon mentioning of hallucination and intrusive thoughts. She endorses feeling depressed and anxious, denies SI/HI and reports AVH and intrusive racing thoughts. She states that \" she cant sleep due to these thoughts\" and asking for medication to help with symptoms. She is agreeable taking medications. We will continue to monitor her. Called [facility] and according to madeline she was getting Perseris 120 mg SC from [facility] and last dose she recived was on 9/26/2022. She hasn't be there since June/2022.
about quitting (benefits of quitting, techniques in how to quit, available resources  ( ) Referral for counseling faxed to [facility]                                                                                                                   ( ) Patient refused counseling  ( ) Patient refused referral  ( ) Patient refused prescription upon discharge  ( x) Patient has not smoked in the last 30 days    Metabolic Screening:    Lab Results   Component Value Date    LABA1C 5.3 03/30/2022       Lab Results   Component Value Date    CHOL 183 03/29/2022    CHOL 221 (H) 07/14/2020    CHOL 190 08/11/2016    CHOL 186 07/27/2015     Lab Results   Component Value Date    TRIG 55 03/29/2022    TRIG 67 07/14/2020    TRIG 103 08/11/2016    TRIG 83 07/27/2015     Lab Results   Component Value Date    HDL 65 03/29/2022    HDL 74 07/14/2020    HDL 41 08/11/2016    HDL 51 07/27/2015     No components found for: [facility]  Lab Results   Component Value Date    LABVLDL 11 03/29/2022    LABVLDL 13 07/14/2020    LABVLDL 21 08/11/2016    LABVLDL 17 07/27/2015       Gus Ruiz RN
Date    CHOL 183 03/29/2022    CHOL 221 (H) 07/14/2020    CHOL 190 08/11/2016    CHOL 186 07/27/2015     Lab Results   Component Value Date    TRIG 55 03/29/2022    TRIG 67 07/14/2020    TRIG 103 08/11/2016    TRIG 83 07/27/2015     Lab Results   Component Value Date    HDL 65 03/29/2022    HDL 74 07/14/2020    HDL 41 08/11/2016    HDL 51 07/27/2015     No components found for: LDLCAL  Lab Results   Component Value Date    LABVLDL 11 03/29/2022    LABVLDL 13 07/14/2020    LABVLDL 21 08/11/2016    LABVLDL 17 07/27/2015         Body mass index is 25.75 kg/m². BP Readings from Last 2 Encounters:   03/15/23 132/86   03/12/23 129/83           Pt admitted with followings belongings:  Dental Appliances: None  Vision - Corrective Lenses: None  Hearing Aid: None  Jewelry: None  Body Piercings Removed: No  Clothing:  Footwear, Jacket/Coat, Socks, Undergarments  Other Valuables: Mellisa Mcconnell RN

## 2023-03-28 NOTE — CARE COORDINATION
CATHLEEN called [address] [place] [address] [phone] to discuss determination of pt being discharged today. CATHLEEN spoke with [facility] who stated that they are still reviewing the referral and to call back in 5-10 minutes. UPDATE: CATHLEEN called [facility] and spoke with Carroll who stated that the pt is accepted to the facility. SW was informed that pt will need to have a 14 day supply of medications. SW as informed that pt's Suboxone is still at their facility. SW informed them at pt will have a 30 days supply of the MH medications.      In order to ensure appropriate transition and discharge planning is in place, the following documents have been transmitted to [facility] and [facility], as the new outpatient provider:    The d/c diagnosis was transmitted to the next care provider  The reason for hospitalization was transmitted to the next care provider  The d/c medications (dosage and indication) were transmitted to the next care provider   The continuing care plan was transmitted to the next care provider
CATHLEEN contacted pt for post follow up post discharge phone call. Pt is at the [facility] per dennise doing well.
CATHLEEN met with pt who reported that she is active with [facility] for [facility] health services. CATHLEEN called [facility] [phone] and spoke with Monalisa Suresh who stated that the pt does not currently have any upcoming appointments scheduled. Monalisa Suresh stated that the pt is active in their system but has not been to any appointments. Monalisa Suresh stated that SW can put the information in the discharge paperwork and the crisis unit can schedule follow up appointments when the pt is discharged. CATHLEEN informed Monalisa Suresh that pt is due for a ANDRADE risperiDONE ER (PERSERIS) subcutaneous injection 120 mg on 4/20.      [address] [facility]  Location: [address] [facility]  Phone number: [phone]  Fax number: [phone]    CATHLEEN called [phone] to confirm that pt can continue being seen for her Suboxone, SW was informed that the pt is prescribed the medications until 3/27 and she has an appointment scheduled for tomorrow 3/28 at [facility] in [facility]   Address: [address] [place] [address]   Phone: [phone]   Fax: [phone]
CATHLEEN met with pt who stated that she is currently taking Suboxone and she gets it prescribed by [facility]. SW informed pt that SW is going to call to inform them that she is currently in the hospital so services are not discontinued. Pt expressed understanding. SW will call [facility] when they open at 8:30 AM.     UPDATE: SW called [facility] to inform them that pt is currently in the hospital. CATHLEEN spoke with [facility] and informed her that pt is currently in the hospital. [facility] stated that the pt will not be discontinued from their services.      [facility] in [facility]   Address: [address] [place] [address]   Phone: [phone]   Fax: [phone]
SW called [facility] and spoke with dennise who stated that they received the referral and it is currently being reviewed.
SW called [facility] to schedule transportation [phone] and [phone], their phones are currently down. SW will continue to try to get in contact to schedule transportation. CATHLEEN spoke with RN manager Jayde Yepez who approved a taxi voucher for the pt to go to the crisis unit. SW provided RN with Voucher.
SW met with pt during treatment team. Pt stated that she is feeling a little better today but she is still having some AH. Pt and NP discussed ANDRADE for tomorrow. Pt's plan is to step down to [facility] after DC from the hospital. Pt denied any SI, HI, VH and paranoia. Pt was flat, blunt and delayed during this encounter.
SW met with pt to determine how she is doing today. Pt was seen while eating her breakfast and pt stated that when she leaves the hospital she is going to be discharging home with her children. Pt stated that her friend or her neighbor is currently with the children but pt appeared to be unsure. Per documentation in chart pt has a 12year old daughter and a 3year old grandchild. SW asked pt how she is planning to get home and she stated that she will need to talk to someone to figure out transportation. Pt stated that she will get her phone today to get number to call. Possible CSB reported needed, SW will determine when pt is stable and able to discuss her daughter further.
SW met with pt to discuss discahrge today. Pt stated that she is agreeable to go back to the crisis unit today. Pt stated that she was there for 2 days prior to coming to the hospital and all of her belongings are currently there. Pt stated that she is feeling okay today and she wants to be discharged back to the crisis unit. Pt stated that she has no anxiety or depression. Pt reported that she is feeling better and she has been talking to her daughter. Pt stated that her 12year old daughter has been staying with her neighbor while she is in the hospital. Pt stated that she is currently getting Suboxone and she has her supply of the medications already at the crisis unit. SW faxed referral to [facility].
Sw met with pt to discuss signing an ALESIA. Pt found in her room making her bed. Pt states that she needs her phone in order to get numbers out, but that she is unsure who she would ant sw to call. She states that one of her brothers would be good for sw to contact, but is unsure which one. PT declined to sign ALESIA at this time. She states that she would like to think about who she would sign ALESIA for first. Pt is internally stimulated and paranoid.
with her 11 yo daughter   [] Shelter:  [] Crisis Unit:  [] Substance Abuse Rehab:  [] Nursing Facility:  [] Other (Specify):     Follow up Provider: pt not connected with a provider at this time

## 2023-07-06 ENCOUNTER — OFFICE VISIT (OUTPATIENT)
Dept: PRIMARY CARE CLINIC | Age: 33
End: 2023-07-06
Payer: COMMERCIAL

## 2023-07-06 VITALS
HEART RATE: 87 BPM | SYSTOLIC BLOOD PRESSURE: 132 MMHG | HEIGHT: 64 IN | DIASTOLIC BLOOD PRESSURE: 76 MMHG | RESPIRATION RATE: 20 BRPM | BODY MASS INDEX: 41.38 KG/M2 | WEIGHT: 242.4 LBS | TEMPERATURE: 97.8 F | OXYGEN SATURATION: 98 %

## 2023-07-06 DIAGNOSIS — M25.471 BILATERAL SWELLING OF FEET AND ANKLES: Primary | ICD-10-CM

## 2023-07-06 DIAGNOSIS — M79.661 RIGHT CALF PAIN: ICD-10-CM

## 2023-07-06 DIAGNOSIS — M25.472 BILATERAL SWELLING OF FEET AND ANKLES: Primary | ICD-10-CM

## 2023-07-06 DIAGNOSIS — M25.474 BILATERAL SWELLING OF FEET AND ANKLES: Primary | ICD-10-CM

## 2023-07-06 DIAGNOSIS — M25.475 BILATERAL SWELLING OF FEET AND ANKLES: Primary | ICD-10-CM

## 2023-07-06 PROCEDURE — G8427 DOCREV CUR MEDS BY ELIG CLIN: HCPCS | Performed by: NURSE PRACTITIONER

## 2023-07-06 PROCEDURE — 99213 OFFICE O/P EST LOW 20 MIN: CPT | Performed by: NURSE PRACTITIONER

## 2023-07-06 PROCEDURE — G8417 CALC BMI ABV UP PARAM F/U: HCPCS | Performed by: NURSE PRACTITIONER

## 2023-07-06 PROCEDURE — 1036F TOBACCO NON-USER: CPT | Performed by: NURSE PRACTITIONER

## 2023-07-06 RX ORDER — HYDROCHLOROTHIAZIDE 12.5 MG/1
12.5 CAPSULE, GELATIN COATED ORAL EVERY MORNING
Qty: 7 CAPSULE | Refills: 0 | Status: SHIPPED | OUTPATIENT
Start: 2023-07-06

## 2023-07-06 NOTE — PROGRESS NOTES
Chief Complaint:   Foot Swelling (Feet & ankle swelling x 1-2 weeks. States she does have a HX of retaining water & swelling. No current PCP. )    History of Present Illness   HPI:  Karuna Rodrigues is a 28 y.o. female who presents to [address] [place] [address] today for bilateral feet and ankle swelling. States that she works 2 jobs and is on her feet all day. States it does cause her some pain as well. Swelling is worse in the right ankle/foot than the left. She does have pain in the right calf as well. States she will have pain shoot up her right leg at times to her hip. Denies any recent long travel, cigarette smoking or birth control. She reports she has had this before, states she was put on a water pill for water retention. Does not remember the name of the medication however. Prior to Visit Medications    Medication Sig Taking? Authorizing Provider   hydroCHLOROthiazide (MICROZIDE) 12.5 MG capsule Take 1 capsule by mouth every morning Yes AKHIL De La Rosa CNP   buprenorphine-naloxone (SUBOXONE) 8-2 MG FILM SL film Place 1 Film under the tongue 2 times daily.  Yes Historical Provider, MD   melatonin 3 MG TABS tablet Take 1 tablet by mouth daily  AKHIL Carvalho CNP   OXcarbazepine (TRILEPTAL) 300 MG tablet Take 1 tablet by mouth 2 times daily  AKHIL Zelaya CNP   risperiDONE ER (PERSERIS) 120 MG PRSY subcutaneous injection Inject 0.8 mLs into the skin every 30 days Next injection is due 9/70/3302  AKHIL Carvalho CNP   benztropine (COGENTIN) 1 MG tablet Take 1 tablet by mouth 2 times daily  AKHIL Moreland CNP   OLANZapine zydis (ZYPREXA) 10 MG disintegrating tablet Take 1 tablet by mouth nightly  AKHIL Moreland CNP   nicotine (NICODERM CQ) 21 MG/24HR Place 1 patch onto the skin daily  AKIHL Carvalho CNP   famotidine (PEPCID AC MAXIMUM STRENGTH) 20 MG tablet Take 1 tablet by mouth 2 times daily  Patient not taking: Reported on 7/6/2023  AKHIL Young CNP

## 2023-07-14 ENCOUNTER — HOSPITAL ENCOUNTER (OUTPATIENT)
Dept: ULTRASOUND IMAGING | Age: 33
End: 2023-07-14
Payer: COMMERCIAL

## 2023-07-14 DIAGNOSIS — M79.661 RIGHT CALF PAIN: ICD-10-CM

## 2023-07-14 PROCEDURE — 93971 EXTREMITY STUDY: CPT

## 2023-08-11 ENCOUNTER — OFFICE VISIT (OUTPATIENT)
Dept: PRIMARY CARE CLINIC | Age: 33
End: 2023-08-11
Payer: COMMERCIAL

## 2023-08-11 VITALS
RESPIRATION RATE: 16 BRPM | BODY MASS INDEX: 37.56 KG/M2 | DIASTOLIC BLOOD PRESSURE: 88 MMHG | TEMPERATURE: 97.4 F | WEIGHT: 220 LBS | HEART RATE: 93 BPM | OXYGEN SATURATION: 97 % | HEIGHT: 64 IN | SYSTOLIC BLOOD PRESSURE: 130 MMHG

## 2023-08-11 DIAGNOSIS — M25.475 BILATERAL SWELLING OF FEET AND ANKLES: ICD-10-CM

## 2023-08-11 DIAGNOSIS — M25.474 BILATERAL SWELLING OF FEET AND ANKLES: ICD-10-CM

## 2023-08-11 DIAGNOSIS — M25.472 BILATERAL SWELLING OF FEET AND ANKLES: ICD-10-CM

## 2023-08-11 DIAGNOSIS — M25.471 BILATERAL SWELLING OF FEET AND ANKLES: ICD-10-CM

## 2023-08-11 PROCEDURE — G8427 DOCREV CUR MEDS BY ELIG CLIN: HCPCS | Performed by: FAMILY MEDICINE

## 2023-08-11 PROCEDURE — 1036F TOBACCO NON-USER: CPT | Performed by: FAMILY MEDICINE

## 2023-08-11 PROCEDURE — G8417 CALC BMI ABV UP PARAM F/U: HCPCS | Performed by: FAMILY MEDICINE

## 2023-08-11 PROCEDURE — 99213 OFFICE O/P EST LOW 20 MIN: CPT | Performed by: FAMILY MEDICINE

## 2023-08-11 RX ORDER — HYDROCHLOROTHIAZIDE 12.5 MG/1
12.5 CAPSULE, GELATIN COATED ORAL EVERY MORNING
Qty: 7 CAPSULE | Refills: 0 | Status: SHIPPED | OUTPATIENT
Start: 2023-08-11

## 2023-08-11 ASSESSMENT — ENCOUNTER SYMPTOMS
SHORTNESS OF BREATH: 0
CHEST TIGHTNESS: 0
WHEEZING: 0

## 2023-11-10 ENCOUNTER — OFFICE VISIT (OUTPATIENT)
Dept: PRIMARY CARE CLINIC | Age: 33
End: 2023-11-10
Payer: COMMERCIAL

## 2023-11-10 VITALS
TEMPERATURE: 97.9 F | WEIGHT: 262.2 LBS | BODY MASS INDEX: 44.76 KG/M2 | OXYGEN SATURATION: 98 % | HEIGHT: 64 IN | HEART RATE: 87 BPM | SYSTOLIC BLOOD PRESSURE: 118 MMHG | DIASTOLIC BLOOD PRESSURE: 75 MMHG | RESPIRATION RATE: 16 BRPM

## 2023-11-10 DIAGNOSIS — M25.471 BILATERAL SWELLING OF FEET AND ANKLES: ICD-10-CM

## 2023-11-10 DIAGNOSIS — M25.474 BILATERAL SWELLING OF FEET AND ANKLES: ICD-10-CM

## 2023-11-10 DIAGNOSIS — M25.472 BILATERAL SWELLING OF FEET AND ANKLES: ICD-10-CM

## 2023-11-10 DIAGNOSIS — M25.475 BILATERAL SWELLING OF FEET AND ANKLES: ICD-10-CM

## 2023-11-10 DIAGNOSIS — M25.471 RIGHT ANKLE SWELLING: Primary | ICD-10-CM

## 2023-11-10 PROCEDURE — G8417 CALC BMI ABV UP PARAM F/U: HCPCS

## 2023-11-10 PROCEDURE — G8427 DOCREV CUR MEDS BY ELIG CLIN: HCPCS

## 2023-11-10 PROCEDURE — G8484 FLU IMMUNIZE NO ADMIN: HCPCS

## 2023-11-10 PROCEDURE — 1036F TOBACCO NON-USER: CPT

## 2023-11-10 PROCEDURE — 99213 OFFICE O/P EST LOW 20 MIN: CPT

## 2023-11-10 RX ORDER — HYDROCHLOROTHIAZIDE 12.5 MG/1
12.5 CAPSULE, GELATIN COATED ORAL EVERY MORNING
Qty: 7 CAPSULE | Refills: 0 | Status: SHIPPED | OUTPATIENT
Start: 2023-11-10

## 2023-11-10 NOTE — PROGRESS NOTES
Chief Complaint:   Foot Pain (Patient states she is here for Rt foot pain ans swelling x4 weeks. Denies injury.)      History of Present Illness   Source of history provided by:  patient. More Velazquez is a 35 y.o. old female presenting to walk-in for evaluation of right ankle pain starting 4 weeks ago. Pt states there was no traumatic injury. She does report standing all day at work. Denies any paresthesias, knee pain, weakness, fever, chills, or abrasions. Pt states there is mild pain with ambulation. Has been taking nothing OTC with minimal symptomatic relief and does not wear compression stocking while at work. Denies any hx of previous injuries or surgeries at the site. She was placed on a water pill which did help. She currently does not have a family doctor to see her for this issue long term. She denies any chest pain, shortness of breath, fevers, chills, nausea, or vomiting. Review of Systems   Unless otherwise stated in this report or unable to obtain because of the patient's clinical or mental status as evidenced by the medical record, this patients's positive and negative responses for Review of Systems, constitutional, psych, eyes, ENT, cardiovascular, respiratory, gastrointestinal, neurological, genitourinary, musculoskeletal, integument systems and systems related to the presenting problem are either stated in the preceding or were negative for the symptoms and/or complaints related to the medical problem. Past Medical History:  has a past medical history of Concussion with loss of consciousness of 30 minutes or less, Herpes genitalia, Paranoid schizophrenia ([address]), Seizures ([address]), and Ulcer. Past Surgical History:  has no past surgical history on file. Social History:  reports that she quit smoking about 21 months ago. Her smoking use included cigarettes. She has never used smokeless tobacco. She reports current alcohol use. She reports current drug use.  Frequency: 14.00 times per

## 2023-11-14 ENCOUNTER — HOSPITAL ENCOUNTER (OUTPATIENT)
Age: 33
Discharge: HOME | End: 2023-11-16
Payer: COMMERCIAL

## 2023-11-14 ENCOUNTER — HOSPITAL ENCOUNTER (OUTPATIENT)
Dept: GENERAL RADIOLOGY | Age: 33
Discharge: HOME | End: 2023-11-16
Payer: COMMERCIAL

## 2023-11-14 ENCOUNTER — HOSPITAL ENCOUNTER (OUTPATIENT)
Dept: GENERAL RADIOLOGY | Age: 33
End: 2023-11-14
Payer: COMMERCIAL

## 2023-11-14 DIAGNOSIS — M25.471 RIGHT ANKLE SWELLING: ICD-10-CM

## 2023-11-14 PROCEDURE — 73610 X-RAY EXAM OF ANKLE: CPT

## 2024-01-12 ENCOUNTER — OFFICE VISIT (OUTPATIENT)
Dept: FAMILY MEDICINE CLINIC | Age: 34
End: 2024-01-12
Payer: COMMERCIAL

## 2024-01-12 VITALS
OXYGEN SATURATION: 92 % | WEIGHT: 268 LBS | HEART RATE: 91 BPM | DIASTOLIC BLOOD PRESSURE: 65 MMHG | BODY MASS INDEX: 46 KG/M2 | TEMPERATURE: 98.1 F | SYSTOLIC BLOOD PRESSURE: 110 MMHG

## 2024-01-12 DIAGNOSIS — Z72.53 HIGH RISK BISEXUAL BEHAVIOR: ICD-10-CM

## 2024-01-12 DIAGNOSIS — F20.0 PARANOID SCHIZOPHRENIA (HCC): ICD-10-CM

## 2024-01-12 DIAGNOSIS — N91.2 AMENORRHEA: ICD-10-CM

## 2024-01-12 DIAGNOSIS — L98.9 SKIN LESION: ICD-10-CM

## 2024-01-12 DIAGNOSIS — Z76.89 ENCOUNTER TO ESTABLISH CARE WITH NEW DOCTOR: Primary | ICD-10-CM

## 2024-01-12 LAB
ABSOLUTE IMMATURE GRANULOCYTE: <0.03 K/UL (ref 0–0.58)
ALBUMIN SERPL-MCNC: 4.2 G/DL (ref 3.5–5.2)
ALP BLD-CCNC: 61 U/L (ref 35–104)
ALT SERPL-CCNC: 19 U/L (ref 0–32)
ANION GAP SERPL CALCULATED.3IONS-SCNC: 12 MMOL/L (ref 7–16)
AST SERPL-CCNC: 24 U/L (ref 0–31)
BASOPHILS ABSOLUTE: 0.02 K/UL (ref 0–0.2)
BASOPHILS RELATIVE PERCENT: 1 % (ref 0–2)
BILIRUB SERPL-MCNC: 0.3 MG/DL (ref 0–1.2)
BUN BLDV-MCNC: 21 MG/DL (ref 6–20)
CALCIUM SERPL-MCNC: 9.1 MG/DL (ref 8.6–10.2)
CHLORIDE BLD-SCNC: 103 MMOL/L (ref 98–107)
CO2: 22 MMOL/L (ref 22–29)
CONTROL: NORMAL
CREAT SERPL-MCNC: 0.7 MG/DL (ref 0.5–1)
EOSINOPHILS ABSOLUTE: 0.12 K/UL (ref 0.05–0.5)
EOSINOPHILS RELATIVE PERCENT: 3 % (ref 0–6)
ESTRADIOL LEVEL: <5 PG/ML
FOLLICLE STIMULATING HORMONE: 0.8 MIU/ML
GFR SERPL CREATININE-BSD FRML MDRD: >60 ML/MIN/1.73M2
GLUCOSE BLD-MCNC: 122 MG/DL (ref 74–99)
HBA1C MFR BLD: 5.9 % (ref 4–5.6)
HCT VFR BLD CALC: 38.8 % (ref 34–48)
HEMOGLOBIN: 12.9 G/DL (ref 11.5–15.5)
IMMATURE GRANULOCYTES: 1 % (ref 0–5)
LH: <0.1 MIU/ML
LYMPHOCYTES ABSOLUTE: 1.12 K/UL (ref 1.5–4)
LYMPHOCYTES RELATIVE PERCENT: 25 % (ref 20–42)
MCH RBC QN AUTO: 29.3 PG (ref 26–35)
MCHC RBC AUTO-ENTMCNC: 33.2 G/DL (ref 32–34.5)
MCV RBC AUTO: 88 FL (ref 80–99.9)
MONOCYTES ABSOLUTE: 0.34 K/UL (ref 0.1–0.95)
MONOCYTES RELATIVE PERCENT: 8 % (ref 2–12)
NEUTROPHILS ABSOLUTE: 2.8 K/UL (ref 1.8–7.3)
NEUTROPHILS RELATIVE PERCENT: 63 % (ref 43–80)
PDW BLD-RTO: 13 % (ref 11.5–15)
PLATELET # BLD: 243 K/UL (ref 130–450)
PMV BLD AUTO: 11.5 FL (ref 7–12)
POTASSIUM SERPL-SCNC: 4.5 MMOL/L (ref 3.5–5)
PREGNANCY TEST URINE, POC: NEGATIVE
RBC # BLD: 4.41 M/UL (ref 3.5–5.5)
SODIUM BLD-SCNC: 137 MMOL/L (ref 132–146)
TOTAL PROTEIN: 7.4 G/DL (ref 6.4–8.3)
WBC # BLD: 4.4 K/UL (ref 4.5–11.5)

## 2024-01-12 PROCEDURE — 81025 URINE PREGNANCY TEST: CPT | Performed by: STUDENT IN AN ORGANIZED HEALTH CARE EDUCATION/TRAINING PROGRAM

## 2024-01-12 PROCEDURE — 99213 OFFICE O/P EST LOW 20 MIN: CPT | Performed by: STUDENT IN AN ORGANIZED HEALTH CARE EDUCATION/TRAINING PROGRAM

## 2024-01-12 RX ORDER — BUPRENORPHINE AND NALOXONE 8; 2 MG/1; MG/1
1 FILM, SOLUBLE BUCCAL; SUBLINGUAL 2 TIMES DAILY
COMMUNITY

## 2024-01-12 RX ORDER — BENZOCAINE/MENTHOL 6 MG-10 MG
LOZENGE MUCOUS MEMBRANE
Qty: 30 G | Refills: 1 | Status: SHIPPED | OUTPATIENT
Start: 2024-01-12 | End: 2024-01-19

## 2024-01-12 RX ORDER — TRAZODONE HYDROCHLORIDE 50 MG/1
50 TABLET ORAL NIGHTLY
COMMUNITY

## 2024-01-12 RX ORDER — HYDROXYZINE PAMOATE 25 MG/1
25 CAPSULE ORAL 2 TIMES DAILY PRN
COMMUNITY

## 2024-01-12 SDOH — ECONOMIC STABILITY: INCOME INSECURITY: HOW HARD IS IT FOR YOU TO PAY FOR THE VERY BASICS LIKE FOOD, HOUSING, MEDICAL CARE, AND HEATING?: NOT HARD AT ALL

## 2024-01-12 SDOH — ECONOMIC STABILITY: FOOD INSECURITY: WITHIN THE PAST 12 MONTHS, YOU WORRIED THAT YOUR FOOD WOULD RUN OUT BEFORE YOU GOT MONEY TO BUY MORE.: NEVER TRUE

## 2024-01-12 SDOH — ECONOMIC STABILITY: FOOD INSECURITY: WITHIN THE PAST 12 MONTHS, THE FOOD YOU BOUGHT JUST DIDN'T LAST AND YOU DIDN'T HAVE MONEY TO GET MORE.: NEVER TRUE

## 2024-01-12 SDOH — ECONOMIC STABILITY: HOUSING INSECURITY
IN THE LAST 12 MONTHS, WAS THERE A TIME WHEN YOU DID NOT HAVE A STEADY PLACE TO SLEEP OR SLEPT IN A SHELTER (INCLUDING NOW)?: NO

## 2024-01-12 ASSESSMENT — PATIENT HEALTH QUESTIONNAIRE - PHQ9
9. THOUGHTS THAT YOU WOULD BE BETTER OFF DEAD, OR OF HURTING YOURSELF: 0
6. FEELING BAD ABOUT YOURSELF - OR THAT YOU ARE A FAILURE OR HAVE LET YOURSELF OR YOUR FAMILY DOWN: 0
SUM OF ALL RESPONSES TO PHQ QUESTIONS 1-9: 12
SUM OF ALL RESPONSES TO PHQ QUESTIONS 1-9: 12
8. MOVING OR SPEAKING SO SLOWLY THAT OTHER PEOPLE COULD HAVE NOTICED. OR THE OPPOSITE, BEING SO FIGETY OR RESTLESS THAT YOU HAVE BEEN MOVING AROUND A LOT MORE THAN USUAL: 3
3. TROUBLE FALLING OR STAYING ASLEEP: 3
10. IF YOU CHECKED OFF ANY PROBLEMS, HOW DIFFICULT HAVE THESE PROBLEMS MADE IT FOR YOU TO DO YOUR WORK, TAKE CARE OF THINGS AT HOME, OR GET ALONG WITH OTHER PEOPLE: 1
SUM OF ALL RESPONSES TO PHQ9 QUESTIONS 1 & 2: 0
SUM OF ALL RESPONSES TO PHQ QUESTIONS 1-9: 12
7. TROUBLE CONCENTRATING ON THINGS, SUCH AS READING THE NEWSPAPER OR WATCHING TELEVISION: 2
SUM OF ALL RESPONSES TO PHQ QUESTIONS 1-9: 12
2. FEELING DOWN, DEPRESSED OR HOPELESS: 0
4. FEELING TIRED OR HAVING LITTLE ENERGY: 3
1. LITTLE INTEREST OR PLEASURE IN DOING THINGS: 0
5. POOR APPETITE OR OVEREATING: 1

## 2024-01-12 NOTE — PROGRESS NOTES
S: 33 y.o. female here for transfer of care and obesity, schizophrenia. Gets risperidone injection every 4 wks. Sent in here for wound at inj site.  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 Clean.   Advised to get checked for DM.   Unprotected sex. No period in 9 mo.   H/o abnl pap    O: VS: /65   Pulse 91   Temp 98.1 °F (36.7 °C) (Temporal)   Wt 121.6 kg (268 lb)   SpO2 92%   BMI 46.00 kg/m²    General: NAD, alert and interacting appropriately.    CV:  RRR, no gallops, rubs, or murmurs    Resp: CTAB   Abd:  Soft, nontender, obese. Lesion on abd. Scar tissue. Lesion is open, no e/o infxn   Ext:  No edema    Impression: obesity, schizophrenia, wound, weight gain, unprotected sex, h/o abnl pap, amenorrhea  Plan:  STD screening  CTM wound for healing  F/u psych  If preg test neg, amenorrhea labs   Rtc 1 mo for pap    Attending Physician Statement  I have discussed the case, including pertinent history and exam findings with the resident.  I agree with the documented assessment and plan.

## 2024-01-12 NOTE — PATIENT INSTRUCTIONS
[place] Transportation Resources*  (Call 211 if need more resources.)       [employer] (CARTS):  What they offer: Public transportation for all of [facility]. Call at least 24 hours in advance to make reservation. Reduced rates for age 65 and over.  [place] Number: [phone]  [place] Number: [phone]  [place] Number: [phone]    [facility] TRANSPORTATION:  What they offer: No cost transportation for [place] residents aged 65 and over; weekly shopping schedule.  Phone: [phone]    [facility] SERVICES TRANSPORTATION:  What they offer: Transportation for [facility] residents aged 60 and over who do not require assistance getting on or off vehicle. Service for medical appointments, congregate meal sites, grocery shopping or social service appointments for most of [facility]. Cost: $5 round trip donation.  Phone: [phone]    [facility] TRANSPORTATION:  What they offer: Transportation for [facility] residents aged 60 and over or handicapped on a weekly schedule for medical appointments, shopping, banking, etc. in [place] and [employer]. Cost: donation.  Phone: [phone]  Contact: Starla or Jody    [facility] DEPARTMENT OF JOB AND FAMILY SERVICES:  What they offer: No cost medical transportation for persons on Medicaid.  Phone number: [phone]    [facility] TRANSPORTATION:  What they offer: Transportation for city residents aged 60 and over for [facility] medical appointments and shopping on a weekly schedule. 24-hour notice required. Cost: donation.  Phone: [phone] or [phone]  Website: [employer] [place]URAL SERVICES:  What they offer: No cost transportation to the handicapped and seniors for medical appointments and grocery shopping or in [place], and [place].  Phone: [phone] or [phone]        [facility] TRANSPORTATION:  What they offer: No cost transportation for local

## 2024-01-12 NOTE — PROGRESS NOTES
[facility]  FAMILY MEDICINE RESIDENCY PROGRAM  DATE OF VISIT : 2024    Patient : Fannie Vargas   Age : 33 y.o.    : 1990   MRN : [id number]   ______________________________________________________________________    Chief Complaint :   Chief Complaint   Patient presents with    New Patient     Pt c/o in need if getting her inject site checked out due it not healing. On the left side of abdomen.    Pt needs her A1c checked.    Pt also wants to talk about her weight gain, that she has gain 20 lbs since 2023.       HPI : Fannie Vargas is 33 y.o. female who presented to the clinic today to establish care with new provider. PMH of herpes, seizures and schizophrenia. Receives risperidone injections and states that the site hasn't healed. Received it 4 weeks ago. Site was not draining or bleeding. Denies fever, chills. Some itchiness. States she has COPD but has not taken any inhalers recently; was taking albuterol.     Fhx of DM in mother, father and PGM    Vapes 5000 puffs per week. Smokes cigarettes previously, quit lasst year, 30 ppy. Occasional alcohol use (states 5-6 occasionally?).  Previously took tramadol and was addicted. Went to rehab 2 years ago. Currently on suboxone. Has a 16 yo daughter. Lives with daughter and 3 yo granddaughter.  history of abnormal pap, due for one. Sexually active last week with man, but men and woman    Patient's medications, allergies, past medical, surgical, social and family histories were reviewed and updated as appropriate.    Past Medical History :  Past Medical History:   Diagnosis Date    Concussion with loss of consciousness of 30 minutes or less 2018    Herpes genitalia     Paranoid schizophrenia (HCC)     Seizures (HCC)     Ulcer      History reviewed. No pertinent surgical history.    Allergies :   Allergies   Allergen Reactions    Cephalosporins     Cherry     Cherry Flavor     Other     Peanut-Containing Drug Products Hives

## 2024-01-15 DIAGNOSIS — A59.9 TRICHOMONIASIS: Primary | ICD-10-CM

## 2024-01-15 DIAGNOSIS — Z72.53 HIGH RISK BISEXUAL BEHAVIOR: ICD-10-CM

## 2024-01-15 DIAGNOSIS — N91.2 AMENORRHEA: ICD-10-CM

## 2024-01-15 LAB
HAV IGM SER IA-ACNC: NONREACTIVE
HEPATITIS B CORE IGM ANTIBODY: NONREACTIVE
HEPATITIS B SURF AG,XHBAGS: NONREACTIVE
HEPATITIS C ANTIBODY: NONREACTIVE
RPR: NONREACTIVE

## 2024-01-15 RX ORDER — METRONIDAZOLE 500 MG/1
500 TABLET ORAL 2 TIMES DAILY
Qty: 14 TABLET | Refills: 0 | Status: SHIPPED | OUTPATIENT
Start: 2024-01-15 | End: 2024-01-22

## 2024-01-16 LAB
SEX HORMONE BINDING GLOBULIN: 15 NMOL/L (ref 30–135)
TESTOSTERONE FREE-NONMALE: 7.9 PG/ML (ref 1.3–9.2)
TESTOSTERONE TOTAL: 30 NG/DL (ref 20–70)
TESTOSTERONE, BIOAVAILABLE: 18.5 NG/DL (ref 4.1–25.5)

## 2024-07-25 ENCOUNTER — OFFICE VISIT (OUTPATIENT)
Dept: FAMILY MEDICINE CLINIC | Age: 34
End: 2024-07-25
Payer: COMMERCIAL

## 2024-07-25 VITALS
OXYGEN SATURATION: 97 % | DIASTOLIC BLOOD PRESSURE: 74 MMHG | RESPIRATION RATE: 18 BRPM | SYSTOLIC BLOOD PRESSURE: 140 MMHG | TEMPERATURE: 97.3 F | HEART RATE: 89 BPM | WEIGHT: 282 LBS | BODY MASS INDEX: 48.14 KG/M2 | HEIGHT: 64 IN

## 2024-07-25 DIAGNOSIS — M79.89 BILATERAL SWELLING OF FEET: Primary | ICD-10-CM

## 2024-07-25 DIAGNOSIS — R06.02 SHORTNESS OF BREATH: ICD-10-CM

## 2024-07-25 DIAGNOSIS — R06.01 ORTHOPNEA: ICD-10-CM

## 2024-07-25 LAB
ALBUMIN: 4.4 G/DL (ref 3.5–5.2)
ALP BLD-CCNC: 69 U/L (ref 35–104)
ALT SERPL-CCNC: 20 U/L (ref 0–32)
ANION GAP SERPL CALCULATED.3IONS-SCNC: 13 MMOL/L (ref 7–16)
AST SERPL-CCNC: 17 U/L (ref 0–31)
BACTERIA: ABNORMAL
BILIRUB SERPL-MCNC: 0.4 MG/DL (ref 0–1.2)
BILIRUBIN, URINE: NEGATIVE
BUN BLDV-MCNC: 17 MG/DL (ref 6–20)
CALCIUM SERPL-MCNC: 9.1 MG/DL (ref 8.6–10.2)
CHLORIDE BLD-SCNC: 101 MMOL/L (ref 98–107)
CO2: 24 MMOL/L (ref 22–29)
COLOR, UA: YELLOW
CREAT SERPL-MCNC: 0.8 MG/DL (ref 0.5–1)
EPITHELIAL CELLS, UA: ABNORMAL /HPF
GFR, ESTIMATED: >90 ML/MIN/1.73M2
GLUCOSE BLD-MCNC: 101 MG/DL (ref 74–99)
GLUCOSE URINE: NEGATIVE MG/DL
HBA1C MFR BLD: 6 % (ref 4–5.6)
HCT VFR BLD CALC: 40.8 % (ref 34–48)
HEMOGLOBIN: 13.3 G/DL (ref 11.5–15.5)
KETONES, URINE: NEGATIVE MG/DL
LEUKOCYTE ESTERASE, URINE: ABNORMAL
MCH RBC QN AUTO: 29.8 PG (ref 26–35)
MCHC RBC AUTO-ENTMCNC: 32.6 G/DL (ref 32–34.5)
MCV RBC AUTO: 91.3 FL (ref 80–99.9)
MUCUS: PRESENT
NITRITE, URINE: NEGATIVE
PDW BLD-RTO: 13.8 % (ref 11.5–15)
PH, URINE: 6 (ref 5–9)
PLATELET # BLD: 227 K/UL (ref 130–450)
PMV BLD AUTO: 11.5 FL (ref 7–12)
POTASSIUM SERPL-SCNC: 4.2 MMOL/L (ref 3.5–5)
PRO-BNP: <36 PG/ML (ref 0–125)
PROTEIN UA: NEGATIVE MG/DL
RBC # BLD: 4.47 M/UL (ref 3.5–5.5)
RBC UA: ABNORMAL /HPF
SODIUM BLD-SCNC: 138 MMOL/L (ref 132–146)
SPECIFIC GRAVITY UA: >1.03 (ref 1–1.03)
TOTAL PROTEIN: 8.1 G/DL (ref 6.4–8.3)
TRICHOMONAS: PRESENT
TSH SERPL DL<=0.05 MIU/L-ACNC: 2.46 UIU/ML (ref 0.27–4.2)
TURBIDITY: ABNORMAL
URINE HGB: NEGATIVE
UROBILINOGEN, URINE: 1 EU/DL (ref 0–1)
WBC # BLD: 4.7 K/UL (ref 4.5–11.5)
WBC UA: ABNORMAL /HPF

## 2024-07-25 PROCEDURE — 1036F TOBACCO NON-USER: CPT | Performed by: FAMILY MEDICINE

## 2024-07-25 PROCEDURE — G8417 CALC BMI ABV UP PARAM F/U: HCPCS | Performed by: FAMILY MEDICINE

## 2024-07-25 PROCEDURE — G8428 CUR MEDS NOT DOCUMENT: HCPCS | Performed by: FAMILY MEDICINE

## 2024-07-25 PROCEDURE — 99214 OFFICE O/P EST MOD 30 MIN: CPT

## 2024-07-25 RX ORDER — FUROSEMIDE 20 MG/1
20 TABLET ORAL DAILY
Qty: 5 TABLET | Refills: 0 | Status: SHIPPED | OUTPATIENT
Start: 2024-07-25 | End: 2024-07-30

## 2024-07-25 NOTE — PROGRESS NOTES
[facility]  FAMILY MEDICINE RESIDENCY PROGRAM  DATE OF VISIT : 2024       PATIENT:Fannie Vargas    AGE:33 y.o.     :1990      MRN:[id number]   ___________________________________________________________________________________________________________________________________________________    ASSESSMENT AND PLAN    Bilateral swelling of feet  Likely secondary to poor circulation from standing for long hours at work; however, unclear picture if pt has CHF as their is no documentation on file. Will obtain labs to assess and use a short course of dieretics to manage.   Ordered labs: Comprehensive Metabolic Panel, CBC, TSH, Urinalysis with Microscopic, Hemoglobin A1C, and Brain Natriuretic Peptide  Ordered meds: furosemide (LASIX) 20 MG tablet; Take 1 tablet by mouth daily for 5 doses  Dispense: 5 tablet; Refill: 0  Recommended to elevated legs and use compression stockings.     Shortness of breath  Likely a component of LINDA and restrictive lung disease from obesity. Will discuss weight loss options at next visit. She may benefit from Sleep Medicine referral and PFT as well.      Return to Office: Return in about 4 weeks (around 2024) for HTN follow up.     Corey Eaton MD PGY-3  This case was discussed with Dr. VANG   ______________________________________________________________________________________________________________________    CHIEF COMPLAINT  Chief Complaint   Patient presents with    Foot Swelling     Bilateral and turning colors and painful      HPI:  History obtained from the patient. Fannie Vargas  is a 33 y.o.  female who presents to clinic with complaints of bilateral feet swelling.     Patient reports that this has been present for years.   It occurs intermittently, but has been persistent for the past few weeks.   The swelling extends from the feet to the knees and she notes it is very tight.  She does experience some discomfort at the swelling

## 2024-07-25 NOTE — PROGRESS NOTES
Attending Physician Statement    S:   Chief Complaint   Patient presents with    Foot Swelling     Bilateral and turning colors and painful       Bilateral LE swelling for years intermittently.  Occasional painful.  Standing at work all day.  Has been seen previously and treated with diuretic.  Occasional orthopnea and PICKENS   Leg elevation not helping, unable to wear compression stocking because of difficulty putting on.  O: Blood pressure (!) 140/74, pulse 89, temperature 97.3 °F (36.3 °C), temperature source Temporal, resp. rate 18, height 1.626 m (5' 4\"), weight 127.9 kg (282 lb), SpO2 97 %, not currently breastfeeding.   Exam:   Heart - RRR   Lungs - clear   Obese   LE- pitting edema of bilateral feet and legs to knee.  Sensation/pulses ok  A: Edema  P:  Lasix for 5 days   Labs   Follow-up as ordered    I have discussed the case, including pertinent history and exam findings with the resident.  I also have personally seen and examined the patient.  I agree with the documented assessment and plan.    Eliezer Myrick MD

## 2024-07-26 DIAGNOSIS — A59.9 TRICHOMONAS INFECTION: Primary | ICD-10-CM

## 2024-07-26 RX ORDER — METRONIDAZOLE 500 MG/1
500 TABLET ORAL 2 TIMES DAILY
Qty: 14 TABLET | Refills: 0 | Status: SHIPPED | OUTPATIENT
Start: 2024-07-26 | End: 2024-08-02

## 2024-09-16 ENCOUNTER — OFFICE VISIT (OUTPATIENT)
Dept: PRIMARY CARE CLINIC | Age: 34
End: 2024-09-16
Payer: COMMERCIAL

## 2024-09-16 VITALS
TEMPERATURE: 97.5 F | WEIGHT: 282 LBS | RESPIRATION RATE: 18 BRPM | HEART RATE: 81 BPM | HEIGHT: 64 IN | DIASTOLIC BLOOD PRESSURE: 85 MMHG | BODY MASS INDEX: 48.14 KG/M2 | OXYGEN SATURATION: 95 % | SYSTOLIC BLOOD PRESSURE: 122 MMHG

## 2024-09-16 DIAGNOSIS — S31.109A WOUND OF ABDOMEN: Primary | ICD-10-CM

## 2024-09-16 PROCEDURE — 1036F TOBACCO NON-USER: CPT | Performed by: NURSE PRACTITIONER

## 2024-09-16 PROCEDURE — 99213 OFFICE O/P EST LOW 20 MIN: CPT | Performed by: NURSE PRACTITIONER

## 2024-09-16 PROCEDURE — G8427 DOCREV CUR MEDS BY ELIG CLIN: HCPCS | Performed by: NURSE PRACTITIONER

## 2024-09-16 PROCEDURE — G8417 CALC BMI ABV UP PARAM F/U: HCPCS | Performed by: NURSE PRACTITIONER

## 2024-09-16 RX ORDER — MUPIROCIN 20 MG/G
OINTMENT TOPICAL
Qty: 30 G | Refills: 0 | Status: SHIPPED | OUTPATIENT
Start: 2024-09-16

## 2025-01-05 NOTE — PROGRESS NOTES
BEHAVIORAL HEALTH FOLLOW-UP NOTE     4/3/2022     Patient was seen and examined in person, Chart reviewed   Patient's case discussed with staff/team    Chief Complaint: Drowsy    Interim History: Patient sitting up on the unit she is working on a puzzle very drowsy nodding off on talking to her. She denies SI/HI intent or plan d still reports auditory hallucinations but states are getting better she believes the medications are working well for her but states she feels drowsy on the medications. She is attending groups staff reports less delusional thought process.         Appetite:   [x] Normal/Unchanged  [] Increased  [] Decreased      Sleep:       [x] Normal/Unchanged  [] Fair       [] Poor              Energy:    [x] Normal/Unchanged  [] Increased  [] Decreased        SI [] Present  [x] Absent    HI  []Present  [x] Absent     Aggression:  [] yes  [x] no    Patient is [x] able  [] unable to CONTRACT FOR SAFETY     PAST MEDICAL/PSYCHIATRIC HISTORY:   Past Medical History:   Diagnosis Date    Concussion with loss of consciousness of 30 minutes or less 6/30/2018    Herpes genitalia     [facility]an[facility] ([facility] [address]     Seizures ([facility]     Ulcer        FAMILY/SOCIAL HISTORY:  Family History   Problem Relation Age of Onset    Diabetes Mother     Diabetes Father     Other Brother      Social History     Socioeconomic History    Marital status: Single     Spouse name: Not on file    Number of children: 1    Years of education: 8    Highest education level: 10th grade   Occupational History    Not on file   Tobacco Use    Smoking status: Current Some Day Smoker     Types: Cigarettes    Smokeless tobacco: Never Used    Tobacco comment: Started smoking again after mom passed away  2021   Vaping Use    Vaping Use: Never used   Substance and Sexual Activity    Alcohol use: Yes     Comment: occasionally-holidays    Drug use: No    Sexual activity: Yes     Partners: Male   Other Topics Concern    Not on ISTAT CHEM8 in process at this time.    file   Social History Narrative    2/24/2021-Stress-pt reports that she is very much stressed-pt recently lost her mother and she has been having a hard time dealing with her loss. Pt is currently receiving grief counseling through [place] [address] Determinants of Health     Financial Resource Strain:     Difficulty of Paying Living Expenses: Not on file   Food Insecurity:     Worried About Running Out of Food in the Last Year: Not on file    Suzanne of Food in the Last Year: Not on file   Transportation Needs:     Lack of Transportation (Medical): Not on file    Lack of Transportation (Non-Medical): Not on file   Physical Activity:     Days of Exercise per Week: Not on file    Minutes of Exercise per Session: Not on file   Stress:     Feeling of Stress : Not on file   Social Connections:     Frequency of Communication with Friends and Family: Not on file    Frequency of Social Gatherings with Friends and Family: Not on file    Attends Episcopal Services: Not on file    Active Member of [address] [place] [address] or Organizations: Not on file    Attends Club or Organization Meetings: Not on file    Marital Status: Not on file   Intimate Partner Violence:     Fear of Current or Ex-Partner: Not on file    Emotionally Abused: Not on file    Physically Abused: Not on file    Sexually Abused: Not on file   Housing Stability:     Unable to Pay for Housing in the Last Year: Not on file    Number of [place] in the Last Year: Not on file    Unstable Housing in the Last Year: Not on file           ROS:  [x] All negative/unchanged except if checked.  Explain positive(checked items) below:  [] Constitutional  [] Eyes  [] Ear/Nose/Mouth/Throat  [] Respiratory  [] CV  [] GI  []   [] Musculoskeletal  [] Skin/Breast  [] Neurological  [] Endocrine  [] Heme/Lymph  [] Allergic/Immunologic    Explanation:     MEDICATIONS:    Current Facility-Administered Medications:     risperiDONE (RISPERDAL M-TABS) disintegrating tablet 2 mg, 2 mg, Oral, Daily, Gleda Kanner, APRN - CNP, 2 mg at 04/02/22 0915    risperiDONE (RISPERDAL M-TABS) disintegrating tablet 3 mg, 3 mg, Oral, Nightly, AKHIL Green CNP, 3 mg at 04/02/22 2109    acetaminophen (TYLENOL) tablet 650 mg, 650 mg, Oral, Q6H PRN, Ja Tejada MD    magnesium hydroxide (MILK OF MAGNESIA) 400 MG/5ML suspension 30 mL, 30 mL, Oral, Daily PRN, Ja Tejada MD    nicotine (NICODERM CQ) 21 MG/24HR 1 patch, 1 patch, TransDERmal, Daily, Ja Tejada MD, 1 patch at 04/02/22 0915    aluminum & magnesium hydroxide-simethicone (MAALOX) 200-200-20 MG/5ML suspension 30 mL, 30 mL, Oral, PRN, Ja Tejada MD    hydrOXYzine (VISTARIL) capsule 50 mg, 50 mg, Oral, TID PRN, Ja Tejada MD, 50 mg at 03/31/22 2307    haloperidol (HALDOL) tablet 5 mg, 5 mg, Oral, Q6H PRN **OR** haloperidol lactate (HALDOL) injection 5 mg, 5 mg, IntraMUSCular, Q6H PRN, Ja Tejada MD    melatonin tablet 3 mg, 3 mg, Oral, Nightly, Ja Tejada MD, 3 mg at 04/02/22 2109    buprenorphine-naloxone (SUBOXONE) 8-2 MG SL tablet 1 tablet, 1 tablet, SubLINGual, BID, Gleda Kanner, APRN - CNP, 1 tablet at 04/02/22 2109    albuterol (PROVENTIL) nebulizer solution 2.5 mg, 2.5 mg, Nebulization, S1H PRN, AKHIL Green CNP    budesonide (PULMICORT) nebulizer suspension 500 mcg, 0.5 mg, Nebulization, BID, Gleda Kanner, APRN - CNP, 474 mcg at 04/02/22 1007    Arformoterol Tartrate (BROVANA) nebulizer solution 15 mcg, 15 mcg, Nebulization, BID, AKHIL Green CNP, 15 mcg at 04/02/22 1006      Examination:  BP (!) 102/51   Pulse 90   Temp 97.7 °F (36.5 °C)   Resp 14   Ht 5' 5\" (1.651 m)   Wt 165 lb (74.8 kg)   LMP 02/06/2022 (Approximate)   SpO2 99%   BMI 27.46 kg/m²   Gait - steady  Medication side effects(SE): Denies    Mental Status Examination:    Level of consciousness:  within normal limits   Appearance:  fair grooming and fair hygiene  Behavior/Motor:  no abnormalities noted  Attitude toward examiner:  cooperative  Speech: Normal rate rhythm and tone not offer much conversation  Mood: \"I am okay. \"  Affect: Flat and blunted  Thought processes:  linear   Thought content: Endorses auditory hallucinations appears internally preoccupied delusional paranoid denies SI/HI intent or plan  Cognition:  oriented to person, place, and time   Concentration intact  Insight poor   Judgement poor     ASSESSMENT:  Patient symptoms are:  [] Well controlled  [x] Improving  [] Worsening  [] No change      Diagnosis:  Principal Problem:    Paranoid schizophrenia ([facility] [address]  Active Problems:    Cannabis abuse  Resolved Problems:    * No resolved hospital problems. *      LABS:    No results for input(s): WBC, HGB, PLT in the last 72 hours. No results for input(s): NA, K, CL, CO2, BUN, CREATININE, GLUCOSE in the last 72 hours. No results for input(s): BILITOT, ALKPHOS, AST, ALT in the last 72 hours. Lab Results   Component Value Date    LABAMPH NOT DETECTED 03/29/2022    [facility] NOT DETECTED 07/30/2011    BARBSCNU NOT DETECTED 03/29/2022    LABBENZ NOT DETECTED 03/29/2022    LABBENZ SEE BELOW 08/29/2014    CANNAB POSITIVE  07/30/2011    LABMETH NOT DETECTED 03/29/2022    OPIATESCREENURINE NOT DETECTED 03/29/2022    PHENCYCLIDINESCREENURINE NOT DETECTED 03/29/2022    ETOH <10 03/29/2022     Lab Results   Component Value Date    TSH 0.899 07/14/2020     No results found for: LITHIUM  No results found for: VALPROATE, CBMZ      Treatment Plan:  Reviewed current Medications with the patient. Risks, benefits, side effects, drug-to-drug interactions and alternatives to treatment were discussed. Collateral information:   CD evaluation  Encourage patient to attend group and other milieu activities.   Discharge planning discussed with the patient and treatment team.    We will decrease Risperdal to 1 mg daily and 2 mg at bedtime and start Trileptal 150 mg twice daily patient is very drowsy and nodding off while sitting up on these medications  Continue her Suboxone 8/2 twice daily    PSYCHOTHERAPY/COUNSELING:  [x] Therapeutic interview  [x] Supportive  [] CBT  [] Ongoing  [] Other    [x] Patient continues to need, on a daily basis, active treatment furnished directly by or requiring the supervision of inpatient psychiatric personnel      Anticipated Length of stay: 3 to 7 days based on stability            Electronically signed by AKHIL Neely CNP on 4/6/4712 at 8:36 AM

## 2025-04-25 ENCOUNTER — OFFICE VISIT (OUTPATIENT)
Dept: FAMILY MEDICINE CLINIC | Age: 35
End: 2025-04-25
Payer: COMMERCIAL

## 2025-04-25 VITALS
RESPIRATION RATE: 18 BRPM | HEIGHT: 64 IN | SYSTOLIC BLOOD PRESSURE: 123 MMHG | OXYGEN SATURATION: 99 % | HEART RATE: 76 BPM | DIASTOLIC BLOOD PRESSURE: 64 MMHG | WEIGHT: 293 LBS | BODY MASS INDEX: 50.02 KG/M2 | TEMPERATURE: 97.7 F

## 2025-04-25 DIAGNOSIS — F20.0 PARANOID SCHIZOPHRENIA (HCC): Primary | ICD-10-CM

## 2025-04-25 DIAGNOSIS — Z68.55 SEVERE OBESITY DUE TO EXCESS CALORIES WITH SERIOUS COMORBIDITY AND BODY MASS INDEX (BMI) 120% OF 95TH PERCENTILE TO LESS THAN 140% OF 95TH PERCENTILE FOR AGE IN PEDIATRIC PATIENT (HCC): ICD-10-CM

## 2025-04-25 DIAGNOSIS — R73.03 PREDIABETES: ICD-10-CM

## 2025-04-25 DIAGNOSIS — Z13.1 DIABETES MELLITUS SCREENING: ICD-10-CM

## 2025-04-25 DIAGNOSIS — E66.01 SEVERE OBESITY DUE TO EXCESS CALORIES WITH SERIOUS COMORBIDITY AND BODY MASS INDEX (BMI) 120% OF 95TH PERCENTILE TO LESS THAN 140% OF 95TH PERCENTILE FOR AGE IN PEDIATRIC PATIENT (HCC): ICD-10-CM

## 2025-04-25 LAB — HBA1C MFR BLD: 5.7 %

## 2025-04-25 PROCEDURE — G2211 COMPLEX E/M VISIT ADD ON: HCPCS

## 2025-04-25 PROCEDURE — 99213 OFFICE O/P EST LOW 20 MIN: CPT

## 2025-04-25 PROCEDURE — G8427 DOCREV CUR MEDS BY ELIG CLIN: HCPCS

## 2025-04-25 PROCEDURE — G8417 CALC BMI ABV UP PARAM F/U: HCPCS

## 2025-04-25 PROCEDURE — 83036 HEMOGLOBIN GLYCOSYLATED A1C: CPT

## 2025-04-25 PROCEDURE — 1036F TOBACCO NON-USER: CPT

## 2025-04-25 RX ORDER — NALOXONE HCL 8 MG/.1ML
SPRAY NASAL
COMMUNITY
Start: 2025-03-04

## 2025-04-25 SDOH — ECONOMIC STABILITY: FOOD INSECURITY: WITHIN THE PAST 12 MONTHS, YOU WORRIED THAT YOUR FOOD WOULD RUN OUT BEFORE YOU GOT MONEY TO BUY MORE.: NEVER TRUE

## 2025-04-25 SDOH — ECONOMIC STABILITY: FOOD INSECURITY: WITHIN THE PAST 12 MONTHS, THE FOOD YOU BOUGHT JUST DIDN'T LAST AND YOU DIDN'T HAVE MONEY TO GET MORE.: NEVER TRUE

## 2025-04-25 ASSESSMENT — PATIENT HEALTH QUESTIONNAIRE - PHQ9
SUM OF ALL RESPONSES TO PHQ QUESTIONS 1-9: 0
8. MOVING OR SPEAKING SO SLOWLY THAT OTHER PEOPLE COULD HAVE NOTICED. OR THE OPPOSITE, BEING SO FIGETY OR RESTLESS THAT YOU HAVE BEEN MOVING AROUND A LOT MORE THAN USUAL: NOT AT ALL
SUM OF ALL RESPONSES TO PHQ QUESTIONS 1-9: 0
6. FEELING BAD ABOUT YOURSELF - OR THAT YOU ARE A FAILURE OR HAVE LET YOURSELF OR YOUR FAMILY DOWN: NOT AT ALL
9. THOUGHTS THAT YOU WOULD BE BETTER OFF DEAD, OR OF HURTING YOURSELF: NOT AT ALL
5. POOR APPETITE OR OVEREATING: NOT AT ALL
4. FEELING TIRED OR HAVING LITTLE ENERGY: NOT AT ALL
3. TROUBLE FALLING OR STAYING ASLEEP: NOT AT ALL
10. IF YOU CHECKED OFF ANY PROBLEMS, HOW DIFFICULT HAVE THESE PROBLEMS MADE IT FOR YOU TO DO YOUR WORK, TAKE CARE OF THINGS AT HOME, OR GET ALONG WITH OTHER PEOPLE: NOT DIFFICULT AT ALL
SUM OF ALL RESPONSES TO PHQ QUESTIONS 1-9: 0
7. TROUBLE CONCENTRATING ON THINGS, SUCH AS READING THE NEWSPAPER OR WATCHING TELEVISION: NOT AT ALL
1. LITTLE INTEREST OR PLEASURE IN DOING THINGS: NOT AT ALL
SUM OF ALL RESPONSES TO PHQ QUESTIONS 1-9: 0

## 2025-04-25 NOTE — PROGRESS NOTES
[facility]  FAMILY MEDICINE RESIDENCY PROGRAM  DATE OF VISIT : 25       PATIENT:Fannie Vargas    AGE:34 y.o.     :1990      MRN:[id number]   ___________________________________________________________________________________________________________________________________________________    ASSESSMENT AND PLAN    Paranoid schizophrenia (HCC)  Stable  Continue following with [facility] for counseling and medication management  Medication regimen includes Trileptal 300 BID, risperidone intramuscular injections every 30 days, trazodone 50 mg nightly, Vraylar 3 mg daily, and Vistaril 25 mg 3 times daily as needed    Severe obesity due to excess calories with serious comorbidity and body mass index (BMI) 120% of 95th percentile to less than 140% of 95th percentile for age in pediatric patient (HCC)  BMI 51.84  Patient has not tried dieting and exercise, but, is open to it.  Recommend patient start.   Referral made to Jermain Caruso MD, [facility]    Diabetes mellitus screening  Prediabetes  A1C 5.7.   No medications required. Advised to make healthy lifestyle changes through diet and exercise.      Return to Office: Return in about 3 months (around 2025), or prediabetes.     Corey Eaton MD PGY-3  This case was discussed with Dr. Muller    ______________________________________________________________________________________________________________________    CHIEF COMPLAINT  Chief Complaint   Patient presents with    Other     Pt is concerned about pre diabetes and recent weight gain      HPI:  History obtained from the patient. Fannie Vargas  is a 34 y.o.  female who presents to clinic for diabetic screening.  Patient reports that she would like to be tested for diabetes due to her positive family history.  Patient denies any alarming symptoms at this time.  A1c in office is 5.7.    Obesity; BMI 51.84.  Patient believes

## 2025-04-25 NOTE — PROGRESS NOTES
S: 34 y.o. female presents today for: DM Screening    Interested in DM screening bc of strong family history. Asx.     O: VS: /64   Pulse 76   Temp 97.7 °F (36.5 °C) (Tympanic)   Resp 18   Ht 1.626 m (5' 4\")   Wt (!) 137 kg (302 lb)   SpO2 99%   BMI 51.84 kg/m²   See Dr Eaton note for exam details.     Impression/Plan:   1) A1c 5.7, Prediabetes. Consider A1c q6-12 mos to contine screening given meds. Can consider metformin to progression.     Attending Physician Statement  I have discussed the case, including pertinent history and exam findings with the resident.  I agree with the documented assessment and plan.      Fredy Muller MD

## 2025-05-05 ENCOUNTER — OFFICE VISIT (OUTPATIENT)
Dept: PRIMARY CARE CLINIC | Age: 35
End: 2025-05-05
Payer: COMMERCIAL

## 2025-05-05 VITALS
HEART RATE: 110 BPM | SYSTOLIC BLOOD PRESSURE: 123 MMHG | OXYGEN SATURATION: 93 % | DIASTOLIC BLOOD PRESSURE: 76 MMHG | HEIGHT: 64 IN | RESPIRATION RATE: 16 BRPM | BODY MASS INDEX: 50.02 KG/M2 | WEIGHT: 293 LBS | TEMPERATURE: 98.5 F

## 2025-05-05 DIAGNOSIS — M25.474 BILATERAL SWELLING OF FEET AND ANKLES: Primary | ICD-10-CM

## 2025-05-05 DIAGNOSIS — M25.475 BILATERAL SWELLING OF FEET AND ANKLES: Primary | ICD-10-CM

## 2025-05-05 DIAGNOSIS — M25.472 BILATERAL SWELLING OF FEET AND ANKLES: Primary | ICD-10-CM

## 2025-05-05 DIAGNOSIS — M25.471 BILATERAL SWELLING OF FEET AND ANKLES: Primary | ICD-10-CM

## 2025-05-05 PROCEDURE — G8417 CALC BMI ABV UP PARAM F/U: HCPCS | Performed by: NURSE PRACTITIONER

## 2025-05-05 PROCEDURE — 1036F TOBACCO NON-USER: CPT | Performed by: NURSE PRACTITIONER

## 2025-05-05 PROCEDURE — G8427 DOCREV CUR MEDS BY ELIG CLIN: HCPCS | Performed by: NURSE PRACTITIONER

## 2025-05-05 PROCEDURE — 36415 COLL VENOUS BLD VENIPUNCTURE: CPT | Performed by: NURSE PRACTITIONER

## 2025-05-05 PROCEDURE — 99213 OFFICE O/P EST LOW 20 MIN: CPT | Performed by: NURSE PRACTITIONER

## 2025-05-05 RX ORDER — FUROSEMIDE 20 MG/1
20 TABLET ORAL DAILY
Qty: 5 TABLET | Refills: 0 | Status: SHIPPED | OUTPATIENT
Start: 2025-05-05 | End: 2025-05-10

## 2025-05-05 NOTE — PROGRESS NOTES
Chief Complaint:   Foot Swelling (Bilateral feet swelling few month.)    History of Present Illness   Fannie Vargas  1990  [facility] WALK IN     History of Present Illness  The patient is a 34-year-old female who presents for evaluation of feet swelling.    She has been experiencing persistent bilateral pedal edema for several years, which has recently worsened over the past few months. The severity of the swelling has necessitated an increase in her shoe size from 10 to 13. Despite the chronic nature of this condition, she has not sought treatment with compression stockings or leggings. Her occupation involves prolonged standing, which may contribute to the condition. The edema extends up to her knees, but she reports no associated respiratory distress. She was previously evaluated by her primary care physician in 07/2024 for the same issue, at which time she was prescribed a diuretic. This treatment provided some relief, but the condition has since recurred. She had a consultation with her primary care physician two weeks ago, during which the issue of her foot swelling was not addressed. She missed her last PCP appt last week due to financial issues.     Prior to Visit Medications    Medication Sig Taking? Authorizing Provider   furosemide (LASIX) 20 MG tablet Take 1 tablet by mouth daily for 5 days Yes Efren Worrell, APRN - CNP   KLOXXADO 8 MG/0.1ML LIQD nasal spray FOLLOW CARTON INSTRUCTIONS. INSERT NOZZLE INTO 1 NOSTRIL OF THE PATIENT THEN PRESS FIRMLY ON THE PLUNGER AND REMOVE THE NOZZLE AFTER USE. REPEAT WITH NEW DEVICE AFTER 3 MINUTES IF NO OR MINIMAL RESPONSE, ALTERNATING NOSTRILS. Yes ProviderDorys MD   traZODone (DESYREL) 50 MG tablet Take 1 tablet by mouth nightly Yes ProviderDorys MD   hydrOXYzine pamoate (VISTARIL) 25 MG capsule Take 1 capsule by mouth 2 times daily as needed for Itching Yes ProviderDorys MD   cariprazine hcl (VRAYLAR) 3 MG CAPS capsule

## 2025-05-06 ENCOUNTER — RESULTS FOLLOW-UP (OUTPATIENT)
Dept: PRIMARY CARE CLINIC | Age: 35
End: 2025-05-06

## 2025-05-06 LAB
ANION GAP SERPL CALCULATED.3IONS-SCNC: 17 MMOL/L (ref 7–16)
BUN BLDV-MCNC: 20 MG/DL (ref 6–20)
CALCIUM SERPL-MCNC: 9.4 MG/DL (ref 8.6–10)
CHLORIDE BLD-SCNC: 103 MMOL/L (ref 98–107)
CO2: 20 MMOL/L (ref 22–29)
CREAT SERPL-MCNC: 0.7 MG/DL (ref 0.5–1)
GFR, ESTIMATED: >90 ML/MIN/1.73M2
GLUCOSE BLD-MCNC: 171 MG/DL (ref 74–99)
POTASSIUM SERPL-SCNC: 4 MMOL/L (ref 3.5–5.1)
SODIUM BLD-SCNC: 141 MMOL/L (ref 136–145)

## 2025-05-12 ENCOUNTER — TELEPHONE (OUTPATIENT)
Dept: BARIATRICS/WEIGHT MGMT | Age: 35
End: 2025-05-12

## 2025-05-13 NOTE — TELEPHONE ENCOUNTER
I reviewed the chart and also did not find a billable diagnosis. Please let the pt know we are happy to see her, but it will be self-pay.

## 2025-05-14 ENCOUNTER — TELEPHONE (OUTPATIENT)
Age: 35
End: 2025-05-14

## 2025-05-14 NOTE — TELEPHONE ENCOUNTER
A first call was made in an attempt to reach this patient for a referral we received. I spoke with the patient who stated that she was not interested in getting scheduled.     Referral closed

## 2025-06-02 NOTE — PROGRESS NOTES
[facility]  FAMILY MEDICINE RESIDENCY PROGRAM  DATE OF VISIT : 25       PATIENT:Fnanie Vargas    AGE:34 y.o.     :1990      MRN:[id number]   ___________________________________________________________________________________________________________________________________________________    ASSESSMENT AND PLAN    Bilateral swelling of feet  Chronic fatigue  Heart murmur  Shortness of breath  Per patient, she was diagnosed with CHF and COPD over two years ago; however, on extensive chart review there is no official documentation nor appropriate labs/imaging to substantiate this claim. Possibly these diagnoses are a component of her shortness of breath, fatigue, and bilateral lower extremity swelling; however, an official work up is warranted. Other differentials include lymphedema, obesity related symptomology, venous insufficiency, and symptoms secondary to excessive standing from work. For now, will order appropriate work up to further evaluate and results will determine treatment plan.   Order labs: Comprehensive Metabolic Panel, CBC with Auto Differential, Brain Natriuretic Peptide, and Urinalysis with Microscopic  Order Echo (TTE) complete (PRN contrast/bubble/strain/3D); Future  Order Full PFT Study With Bronchodilator; Future  Referral made to [facility] for sleep study.   Treatment plan for now includes: Compression stockings, leg elevation, and Lasix 20 mg daily for 14 days.     LINDA (obstructive sleep apnea)  STOP BANG Score of 4.   Referral to [facility]     Return to Office: Return in about 2 years (around 6/3/2027).     Corey Eaton MD PGY-3  This case was discussed with Dr. Luong   ______________________________________________________________________________________________________________________    CHIEF COMPLAINT  Chief Complaint   Patient presents with    Foot Swelling     Foot swelling and pain in both feet

## 2025-06-03 ENCOUNTER — OFFICE VISIT (OUTPATIENT)
Dept: FAMILY MEDICINE CLINIC | Age: 35
End: 2025-06-03
Payer: COMMERCIAL

## 2025-06-03 VITALS
TEMPERATURE: 98.1 F | RESPIRATION RATE: 17 BRPM | HEIGHT: 64 IN | OXYGEN SATURATION: 94 % | HEART RATE: 96 BPM | SYSTOLIC BLOOD PRESSURE: 132 MMHG | BODY MASS INDEX: 50.02 KG/M2 | DIASTOLIC BLOOD PRESSURE: 76 MMHG | WEIGHT: 293 LBS

## 2025-06-03 DIAGNOSIS — G47.33 OSA (OBSTRUCTIVE SLEEP APNEA): ICD-10-CM

## 2025-06-03 DIAGNOSIS — R06.02 SHORTNESS OF BREATH: ICD-10-CM

## 2025-06-03 DIAGNOSIS — R53.82 CHRONIC FATIGUE: ICD-10-CM

## 2025-06-03 DIAGNOSIS — M79.89 BILATERAL SWELLING OF FEET: Primary | ICD-10-CM

## 2025-06-03 DIAGNOSIS — R01.1 HEART MURMUR: ICD-10-CM

## 2025-06-03 LAB
ALBUMIN: 4.2 G/DL (ref 3.5–5.2)
ALP BLD-CCNC: 60 U/L (ref 35–104)
ALT SERPL-CCNC: 25 U/L (ref 0–32)
ANION GAP SERPL CALCULATED.3IONS-SCNC: 14 MMOL/L (ref 7–16)
AST SERPL-CCNC: 18 U/L (ref 0–31)
BACTERIA: ABNORMAL
BASOPHILS ABSOLUTE: 0.03 K/UL (ref 0–0.2)
BASOPHILS RELATIVE PERCENT: 1 % (ref 0–2)
BILIRUB SERPL-MCNC: 0.3 MG/DL (ref 0–1.2)
BILIRUBIN, URINE: NEGATIVE
BUN BLDV-MCNC: 22 MG/DL (ref 6–20)
CALCIUM SERPL-MCNC: 9.1 MG/DL (ref 8.6–10.2)
CHLORIDE BLD-SCNC: 104 MMOL/L (ref 98–107)
CO2: 22 MMOL/L (ref 22–29)
COLOR, UA: YELLOW
CREAT SERPL-MCNC: 0.8 MG/DL (ref 0.5–1)
EOSINOPHILS ABSOLUTE: 0.11 K/UL (ref 0.05–0.5)
EOSINOPHILS RELATIVE PERCENT: 2 % (ref 0–6)
EPITHELIAL CELLS, UA: ABNORMAL /HPF
GFR, ESTIMATED: >90 ML/MIN/1.73M2
GLUCOSE BLD-MCNC: 140 MG/DL (ref 74–99)
GLUCOSE URINE: NEGATIVE MG/DL
HCT VFR BLD CALC: 38.1 % (ref 34–48)
HEMOGLOBIN: 12.3 G/DL (ref 11.5–15.5)
IMMATURE GRANULOCYTES %: 0 % (ref 0–5)
IMMATURE GRANULOCYTES ABSOLUTE: <0.03 K/UL (ref 0–0.58)
KETONES, URINE: NEGATIVE MG/DL
LEUKOCYTE ESTERASE, URINE: NEGATIVE
LYMPHOCYTES ABSOLUTE: 1.43 K/UL (ref 1.5–4)
LYMPHOCYTES RELATIVE PERCENT: 28 % (ref 20–42)
MCH RBC QN AUTO: 29.9 PG (ref 26–35)
MCHC RBC AUTO-ENTMCNC: 32.3 G/DL (ref 32–34.5)
MCV RBC AUTO: 92.5 FL (ref 80–99.9)
MONOCYTES ABSOLUTE: 0.44 K/UL (ref 0.1–0.95)
MONOCYTES RELATIVE PERCENT: 9 % (ref 2–12)
NEUTROPHILS ABSOLUTE: 3.03 K/UL (ref 1.8–7.3)
NEUTROPHILS RELATIVE PERCENT: 60 % (ref 43–80)
NITRITE, URINE: NEGATIVE
NT PRO BNP: <36 PG/ML (ref 0–125)
PDW BLD-RTO: 13 % (ref 11.5–15)
PH, URINE: 6 (ref 5–8)
PLATELET # BLD: 229 K/UL (ref 130–450)
PMV BLD AUTO: 11.8 FL (ref 7–12)
POTASSIUM SERPL-SCNC: 3.9 MMOL/L (ref 3.5–5)
PROTEIN UA: NEGATIVE MG/DL
RBC # BLD: 4.12 M/UL (ref 3.5–5.5)
RBC UA: ABNORMAL /HPF
SODIUM BLD-SCNC: 140 MMOL/L (ref 132–146)
SPECIFIC GRAVITY UA: >1.03 (ref 1–1.03)
TOTAL PROTEIN: 7.2 G/DL (ref 6.4–8.3)
TURBIDITY: CLEAR
URINE HGB: NEGATIVE
UROBILINOGEN, URINE: 1 EU/DL (ref 0–1)
WBC # BLD: 5.1 K/UL (ref 4.5–11.5)
WBC UA: ABNORMAL /HPF

## 2025-06-03 PROCEDURE — 1036F TOBACCO NON-USER: CPT

## 2025-06-03 PROCEDURE — 99213 OFFICE O/P EST LOW 20 MIN: CPT

## 2025-06-03 PROCEDURE — G8417 CALC BMI ABV UP PARAM F/U: HCPCS

## 2025-06-03 PROCEDURE — G8427 DOCREV CUR MEDS BY ELIG CLIN: HCPCS

## 2025-06-03 RX ORDER — FUROSEMIDE 20 MG/1
20 TABLET ORAL DAILY
Qty: 14 TABLET | Refills: 0 | Status: SHIPPED | OUTPATIENT
Start: 2025-06-03 | End: 2025-06-17

## 2025-06-03 NOTE — PROGRESS NOTES
[facility]  Precepting Note    Subjective:  Foot swelling and pain   Aching, sharp, intermittent   Works for 7 hours per day on her feet  Was started on Lasix at urgent care which helped  Report hx of heart failure and COPD-     ROS otherwise negative     Past medical, surgical, family and social history were reviewed, non-contributory, and unchanged unless otherwise stated.    Objective:    /76 (BP Site: Left Upper Arm, Patient Position: Sitting, BP Cuff Size: Large Adult)   Pulse 96   Temp 98.1 °F (36.7 °C) (Temporal)   Resp 17   Ht 1.626 m (5' 4\")   Wt (!) 138.3 kg (305 lb)   LMP  (LMP Unknown)   SpO2 94%   BMI 52.35 kg/m²     Exam is as noted by resident with the following changes, additions or corrections:    General:  NAD; alert & oriented x 3   Heart:  RRR, no murmurs, gallops, or rubs.  Lungs:  CTA bilaterally, no wheeze, rales or rhonchi  Extrem:  1+ edema bilaterally     Assessment/Plan:  Foot swelling- check BNP. Echo. Will cont Lasix for short period. Elevate, compression  Hx of COPD/asthma- PFTs. No current inhaler use   Refer for sleep study     Attending Physician Statement  I have reviewed the chart, including any radiology or labs. I have discussed the case, including pertinent history and exam findings with the resident.  I agree with the assessment, plan and orders as documented by the resident.  Please refer to the resident note for additional information.      Electronically signed by Zain Luong MD on 6/3/2025 at 10:04 AM

## 2025-06-03 NOTE — PATIENT INSTRUCTIONS
Schedule ECHO    Schedule PFT    Schedule with Sleep Medicine for sleep study     Use compression stockings and leg elevation for bilateral foot swelling. A two week course of Lasix 20 mg prescribed. Please follow up in two weeks for further evaluation.

## 2025-06-04 ENCOUNTER — RESULTS FOLLOW-UP (OUTPATIENT)
Dept: FAMILY MEDICINE CLINIC | Age: 35
End: 2025-06-04

## 2025-08-01 ENCOUNTER — TELEPHONE (OUTPATIENT)
Dept: CARDIOLOGY | Age: 35
End: 2025-08-01

## 2025-08-01 NOTE — TELEPHONE ENCOUNTER
Called PT 1X and LM to call back and schedule echo.             Electronically signed by Alexsandra Carter on 8/1/2025 at 1:08 PM

## 2025-08-05 ENCOUNTER — OFFICE VISIT (OUTPATIENT)
Dept: FAMILY MEDICINE CLINIC | Age: 35
End: 2025-08-05
Payer: COMMERCIAL

## 2025-08-05 VITALS
OXYGEN SATURATION: 98 % | DIASTOLIC BLOOD PRESSURE: 83 MMHG | HEART RATE: 92 BPM | HEIGHT: 64 IN | RESPIRATION RATE: 16 BRPM | WEIGHT: 293 LBS | BODY MASS INDEX: 50.02 KG/M2 | SYSTOLIC BLOOD PRESSURE: 125 MMHG | TEMPERATURE: 98.4 F

## 2025-08-05 DIAGNOSIS — Z00.00 HEALTH CARE MAINTENANCE: ICD-10-CM

## 2025-08-05 DIAGNOSIS — F20.0 PARANOID SCHIZOPHRENIA (HCC): Primary | ICD-10-CM

## 2025-08-05 DIAGNOSIS — F11.21 OPIOID DEPENDENCE IN REMISSION (HCC): ICD-10-CM

## 2025-08-05 DIAGNOSIS — M79.89 BILATERAL SWELLING OF FEET: ICD-10-CM

## 2025-08-05 PROCEDURE — G8428 CUR MEDS NOT DOCUMENT: HCPCS | Performed by: FAMILY MEDICINE

## 2025-08-05 PROCEDURE — 99213 OFFICE O/P EST LOW 20 MIN: CPT

## 2025-08-05 PROCEDURE — G8417 CALC BMI ABV UP PARAM F/U: HCPCS | Performed by: FAMILY MEDICINE

## 2025-08-05 PROCEDURE — 1036F TOBACCO NON-USER: CPT | Performed by: FAMILY MEDICINE

## 2025-08-05 RX ORDER — FUROSEMIDE 20 MG/1
10 TABLET ORAL DAILY
Qty: 14 TABLET | Refills: 0 | Status: SHIPPED | OUTPATIENT
Start: 2025-08-05 | End: 2025-09-02

## 2025-08-05 ASSESSMENT — PATIENT HEALTH QUESTIONNAIRE - PHQ9
SUM OF ALL RESPONSES TO PHQ QUESTIONS 1-9: 3
10. IF YOU CHECKED OFF ANY PROBLEMS, HOW DIFFICULT HAVE THESE PROBLEMS MADE IT FOR YOU TO DO YOUR WORK, TAKE CARE OF THINGS AT HOME, OR GET ALONG WITH OTHER PEOPLE: NOT DIFFICULT AT ALL
1. LITTLE INTEREST OR PLEASURE IN DOING THINGS: NOT AT ALL
7. TROUBLE CONCENTRATING ON THINGS, SUCH AS READING THE NEWSPAPER OR WATCHING TELEVISION: SEVERAL DAYS
8. MOVING OR SPEAKING SO SLOWLY THAT OTHER PEOPLE COULD HAVE NOTICED. OR THE OPPOSITE, BEING SO FIGETY OR RESTLESS THAT YOU HAVE BEEN MOVING AROUND A LOT MORE THAN USUAL: NOT AT ALL
SUM OF ALL RESPONSES TO PHQ QUESTIONS 1-9: 3
4. FEELING TIRED OR HAVING LITTLE ENERGY: SEVERAL DAYS
6. FEELING BAD ABOUT YOURSELF - OR THAT YOU ARE A FAILURE OR HAVE LET YOURSELF OR YOUR FAMILY DOWN: NOT AT ALL
SUM OF ALL RESPONSES TO PHQ QUESTIONS 1-9: 3
SUM OF ALL RESPONSES TO PHQ QUESTIONS 1-9: 3
5. POOR APPETITE OR OVEREATING: SEVERAL DAYS
2. FEELING DOWN, DEPRESSED OR HOPELESS: NOT AT ALL
3. TROUBLE FALLING OR STAYING ASLEEP: NOT AT ALL

## 2025-08-30 ENCOUNTER — HOSPITAL ENCOUNTER (OUTPATIENT)
Dept: LAB | Age: 35
Discharge: HOME | End: 2025-08-30
Payer: COMMERCIAL

## 2025-08-30 LAB
25(OH)D3 SERPL-MCNC: 13.4 NG/ML (ref 30–100)
ALBUMIN SERPL-MCNC: 4.1 G/DL (ref 3.5–5.2)
ALP SERPL-CCNC: 67 U/L (ref 35–104)
ALT SERPL-CCNC: 45 U/L (ref 0–35)
ANION GAP SERPL CALCULATED.3IONS-SCNC: 12 MMOL/L (ref 7–16)
AST SERPL-CCNC: 32 U/L (ref 0–35)
BASOPHILS # BLD: 0.02 K/UL (ref 0–0.2)
BASOPHILS NFR BLD: 0 % (ref 0–2)
BILIRUB DIRECT SERPL-MCNC: 0.1 MG/DL (ref 0–0.2)
BILIRUB SERPL-MCNC: 0.3 MG/DL (ref 0–1.2)
BUN SERPL-MCNC: 20 MG/DL (ref 6–20)
CALCIUM SERPL-MCNC: 9.1 MG/DL (ref 8.6–10)
CHLORIDE SERPL-SCNC: 103 MMOL/L (ref 98–107)
CHOLEST SERPL-MCNC: 193 MG/DL
CO2 SERPL-SCNC: 24 MMOL/L (ref 22–29)
CREAT SERPL-MCNC: 0.8 MG/DL (ref 0.5–1)
EOSINOPHIL # BLD: 0.19 K/UL (ref 0.05–0.5)
EOSINOPHILS RELATIVE PERCENT: 4 % (ref 0–6)
ERYTHROCYTE [DISTWIDTH] IN BLOOD BY AUTOMATED COUNT: 13.3 % (ref 11.5–15)
GFR, ESTIMATED: >90 ML/MIN/1.73M2
GLUCOSE SERPL-MCNC: 121 MG/DL (ref 74–99)
HBA1C MFR BLD: 6.8 % (ref 4–5.6)
HCT VFR BLD AUTO: 37.9 % (ref 34–48)
HDLC SERPL-MCNC: 55 MG/DL
HGB BLD-MCNC: 12.4 G/DL (ref 11.5–15.5)
IMM GRANULOCYTES # BLD AUTO: <0.03 K/UL (ref 0–0.58)
IMM GRANULOCYTES NFR BLD: 0 % (ref 0–5)
LDLC SERPL CALC-MCNC: 116 MG/DL
LYMPHOCYTES NFR BLD: 1.51 K/UL (ref 1.5–4)
LYMPHOCYTES RELATIVE PERCENT: 33 % (ref 20–42)
MCH RBC QN AUTO: 29.9 PG (ref 26–35)
MCHC RBC AUTO-ENTMCNC: 32.7 G/DL (ref 32–34.5)
MCV RBC AUTO: 91.3 FL (ref 80–99.9)
MONOCYTES NFR BLD: 0.37 K/UL (ref 0.1–0.95)
MONOCYTES NFR BLD: 8 % (ref 2–12)
NEUTROPHILS NFR BLD: 54 % (ref 43–80)
NEUTS SEG NFR BLD: 2.51 K/UL (ref 1.8–7.3)
PLATELET # BLD AUTO: 172 K/UL (ref 130–450)
PMV BLD AUTO: 11.4 FL (ref 7–12)
POTASSIUM SERPL-SCNC: 4 MMOL/L (ref 3.5–5.1)
PROT SERPL-MCNC: 7.3 G/DL (ref 6.4–8.3)
RBC # BLD AUTO: 4.15 M/UL (ref 3.5–5.5)
SODIUM SERPL-SCNC: 139 MMOL/L (ref 136–145)
TRIGL SERPL-MCNC: 107 MG/DL
TSH SERPL DL<=0.05 MIU/L-ACNC: 1.33 UIU/ML (ref 0.27–4.2)
VLDLC SERPL CALC-MCNC: 21 MG/DL
WBC OTHER # BLD: 4.6 K/UL (ref 4.5–11.5)

## 2025-08-30 PROCEDURE — 82248 BILIRUBIN DIRECT: CPT

## 2025-08-30 PROCEDURE — 85025 COMPLETE CBC W/AUTO DIFF WBC: CPT

## 2025-08-30 PROCEDURE — 83036 HEMOGLOBIN GLYCOSYLATED A1C: CPT

## 2025-08-30 PROCEDURE — 80053 COMPREHEN METABOLIC PANEL: CPT

## 2025-08-30 PROCEDURE — 80061 LIPID PANEL: CPT

## 2025-08-30 PROCEDURE — 84443 ASSAY THYROID STIM HORMONE: CPT

## 2025-08-30 PROCEDURE — 82306 VITAMIN D 25 HYDROXY: CPT
